# Patient Record
Sex: FEMALE | Race: WHITE | NOT HISPANIC OR LATINO | Employment: FULL TIME | URBAN - METROPOLITAN AREA
[De-identification: names, ages, dates, MRNs, and addresses within clinical notes are randomized per-mention and may not be internally consistent; named-entity substitution may affect disease eponyms.]

---

## 2017-03-03 ENCOUNTER — TELEPHONE (OUTPATIENT)
Dept: FAMILY MEDICINE | Facility: CLINIC | Age: 22
End: 2017-03-03

## 2017-03-03 NOTE — TELEPHONE ENCOUNTER
Reason for Call:  Other Pt's rogelio, Triston called with concerns about pt. Said he thought it was post partum at first, but now thinks it's depression. Pt has been not coming home & communicating with family.Gone sometimes 36 hours at a time without communicating to there where she is. No consent to communicate on file yet.    Detailed comments: Triston would like a call back if you would have time.     Phone Number Patient can be reached at: Other phone number:  [phone]    Best Time: any    Can we leave a detailed message on this number? YES    Call taken on 3/3/2017 at 2:04 PM by Odilia Judge

## 2017-03-06 NOTE — TELEPHONE ENCOUNTER
"Reached rogelio Mario, on his cell phone.  He sat down with pt this past weekend and she admitted some symptoms of depression - no suicidal thoughts or feelings voiced or acknowledged by pt.  Didn't reveal any information to Mario,  He's been trying to talk more about his feelings despite her \"breaking up with him\"  Several weeks ago.  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 They have never .   Made appt for Micki to come in to see me in 2 days time at 4:45pm on Wed 3/8/2017 - Mario will offer that time to pt and if she cannot make it in person secondary to work schedule - working 11am-7pm M-F , they will convert it to a telephone visit.  Please, call or return to clinic or go to the ER immediately if signs or symptoms worsen or fail to improve as anticipated.   "

## 2017-03-08 ENCOUNTER — OFFICE VISIT (OUTPATIENT)
Dept: FAMILY MEDICINE | Facility: CLINIC | Age: 22
End: 2017-03-08
Payer: COMMERCIAL

## 2017-03-08 VITALS
OXYGEN SATURATION: 99 % | WEIGHT: 151 LBS | SYSTOLIC BLOOD PRESSURE: 120 MMHG | HEART RATE: 85 BPM | BODY MASS INDEX: 24.27 KG/M2 | DIASTOLIC BLOOD PRESSURE: 78 MMHG | HEIGHT: 66 IN | TEMPERATURE: 98.1 F

## 2017-03-08 DIAGNOSIS — F41.9 ANXIETY: ICD-10-CM

## 2017-03-08 DIAGNOSIS — F32.1 MAJOR DEPRESSIVE DISORDER, SINGLE EPISODE, MODERATE (H): Primary | ICD-10-CM

## 2017-03-08 DIAGNOSIS — R45.4 IRRITABILITY AND ANGER: ICD-10-CM

## 2017-03-08 LAB
ERYTHROCYTE [DISTWIDTH] IN BLOOD BY AUTOMATED COUNT: 12.4 % (ref 10–15)
HCT VFR BLD AUTO: 39.3 % (ref 35–47)
HGB BLD-MCNC: 13.2 G/DL (ref 11.7–15.7)
MCH RBC QN AUTO: 30.2 PG (ref 26.5–33)
MCHC RBC AUTO-ENTMCNC: 33.6 G/DL (ref 31.5–36.5)
MCV RBC AUTO: 90 FL (ref 78–100)
PLATELET # BLD AUTO: 210 10E9/L (ref 150–450)
RBC # BLD AUTO: 4.37 10E12/L (ref 3.8–5.2)
WBC # BLD AUTO: 9 10E9/L (ref 4–11)

## 2017-03-08 PROCEDURE — 85027 COMPLETE CBC AUTOMATED: CPT | Performed by: FAMILY MEDICINE

## 2017-03-08 PROCEDURE — 99214 OFFICE O/P EST MOD 30 MIN: CPT | Performed by: FAMILY MEDICINE

## 2017-03-08 PROCEDURE — 36415 COLL VENOUS BLD VENIPUNCTURE: CPT | Performed by: FAMILY MEDICINE

## 2017-03-08 PROCEDURE — 84443 ASSAY THYROID STIM HORMONE: CPT | Performed by: FAMILY MEDICINE

## 2017-03-08 RX ORDER — CITALOPRAM HYDROBROMIDE 20 MG/1
TABLET ORAL
Qty: 30 TABLET | Refills: 1 | Status: SHIPPED | OUTPATIENT
Start: 2017-03-08 | End: 2019-08-23

## 2017-03-08 ASSESSMENT — ANXIETY QUESTIONNAIRES
7. FEELING AFRAID AS IF SOMETHING AWFUL MIGHT HAPPEN: MORE THAN HALF THE DAYS
5. BEING SO RESTLESS THAT IT IS HARD TO SIT STILL: NOT AT ALL
2. NOT BEING ABLE TO STOP OR CONTROL WORRYING: NOT AT ALL
1. FEELING NERVOUS, ANXIOUS, OR ON EDGE: MORE THAN HALF THE DAYS
3. WORRYING TOO MUCH ABOUT DIFFERENT THINGS: NOT AT ALL
6. BECOMING EASILY ANNOYED OR IRRITABLE: SEVERAL DAYS
GAD7 TOTAL SCORE: 7
IF YOU CHECKED OFF ANY PROBLEMS ON THIS QUESTIONNAIRE, HOW DIFFICULT HAVE THESE PROBLEMS MADE IT FOR YOU TO DO YOUR WORK, TAKE CARE OF THINGS AT HOME, OR GET ALONG WITH OTHER PEOPLE: SOMEWHAT DIFFICULT

## 2017-03-08 ASSESSMENT — PATIENT HEALTH QUESTIONNAIRE - PHQ9: 5. POOR APPETITE OR OVEREATING: MORE THAN HALF THE DAYS

## 2017-03-08 NOTE — PROGRESS NOTES
"  SUBJECTIVE:                                                    Micki Thorpe is a 21 year old female who presents to clinic today for the following health issues: here today with her fiance, Mario Holman.  He is father of her 2 children- Lotus and Jefry.   She's had some symptoms of feeling, \"blah\" x 2+ years - even prior to having her 2 kids. Even back to her teenage years.   No hx of seeking treatment for this either with counseling or medication.       Abnormal Mood Symptoms:      Onset: couple - comes and goes    Description:   Depression: YES  Anxiety: YES    Accompanying Signs & Symptoms:  Still participating in activities that you used to enjoy: YES  Fatigue: YES  Irritability: YES  Difficulty concentrating: no   Changes in appetite: YES- under eating  Problems with sleep: YES  Heart racing/beating fast : no   Thoughts of hurting yourself or others: none     History:   Recent stress: YES- single mom of 2 children under the age of 3. Just returned to work full time.   Prior depression hospitalization: None  Family history of depression: YES  Family history of anxiety: YES  Delivery of baby:  March 2016    Precipitating factors:   Alcohol/drug use: YES- alcohol - 3-4 drinks per week - has been drinking more than previous.    Previous to the last month - would be gone for 1-2 days.     Denies use of any other substances.     Alleviating factors:  working       Therapies Tried and outcome: None    She is now working full-time at [employer] - human interaction there helps. 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   Has been off work for a while, now going back to work starting in the next week. He's been stay-at-home dad for the last several months.      We had Mario step outside the room for a while.  Pt broke up with him/called off their engagement  4 weeks ago, but they are still living together . Pt " "wants to be independent and have her own money.  Doesn't feel any romantic feelings for Mario any longer, but feels like he's still her best friend and ackowledges him as being a \"really good dad\" and her best friend now.  Pt having some resentful feelings about being a single young mom and not being able to experience her early 20's as a person in that age group usually does - going out often with friends, partying, etc.  She loves her children dearly, but also wants to go out sometimes.  Doesn't feel romantically connected to Mario any more ,but doesn't want to hurt his feelings.     Feeling a lot of stress with managing work, kids, family expectations, etc.     No suicidal thoughts at all.  Feels like relationship with Mario is really good, just not romantic anymore.   Patient Active Problem List   Diagnosis     Blood type AB+ - no need for rhogam     CARDIOVASCULAR SCREENING; LDL GOAL LESS THAN 160     Unplanned wanted pregnancy - x 2- both on ocp's - 2nd on micronor      Indication for care in labor or delivery     Routine postpartum follow-up     Cervical high risk HPV (human papillomavirus) test positive       No current outpatient prescriptions on file.      No Known Allergies      Problem list and histories reviewed & adjusted, as indicated.  Additional history: as documented    BP Readings from Last 3 Encounters:   03/08/17 120/78   06/29/16 130/80   05/18/16 112/70    Wt Readings from Last 3 Encounters:   03/08/17 151 lb (68.5 kg)   06/29/16 165 lb (74.8 kg)   05/18/16 168 lb (76.2 kg)            Labs reviewed in EPIC    Reviewed and updated as needed this visit by clinical staff  Tobacco  Allergies  Meds  Med Hx  Surg Hx  Fam Hx  Soc Hx      Reviewed and updated as needed this visit by Provider        ROS:   Constitutional, HEENT, cardiovascular, pulmonary, GI, , musculoskeletal, neuro, skin, endocrine and psych systems are negative, except as otherwise noted.     OBJECTIVE:                          " "                          /78  Pulse 85  Temp 98.1  F (36.7  C) (Oral)  Ht 5' 6\" (1.676 m)  Wt 151 lb (68.5 kg)  SpO2 99%  BMI 24.37 kg/m2  Body mass index is 24.37 kg/(m^2).   GENERAL: healthy, alert, well nourished, well hydrated, no distress  HENT: ear canals- normal; TMs- normal; Nose- normal; Mouth- no ulcers, no lesions  NECK: no tenderness, no adenopathy, no asymmetry, no masses, no stiffness; thyroid- normal to palpation.   RESP: lungs clear to auscultation - no rales, no rhonchi, no wheezes  CV: regular rates and rhythm, normal S1 S2, no S3 or S4 and no murmur, no click or rub -  ABDOMEN: soft, no tenderness, no  hepatosplenomegaly, no masses, normal bowel sounds  Alert and oriented. No acute distress. Appears well-groomed and casually dressed. Affect is mildly depressed. In good humor , though, and laughs appropriately. Not particularly anxious. No evidence of psychosis.      PHQ-9 SCORE 4/23/2013 8/4/2014 3/8/2017   Total Score 0 1 -   Total Score - - 14     ZULMA-7 SCORE 4/23/2013 3/8/2017   Total Score 0 -   Total Score - 7          Diagnostic test results:  Results for orders placed or performed in visit on 03/08/17 (from the past 24 hour(s))   CBC with platelets   Result Value Ref Range    WBC 9.0 4.0 - 11.0 10e9/L    RBC Count 4.37 3.8 - 5.2 10e12/L    Hemoglobin 13.2 11.7 - 15.7 g/dL    Hematocrit 39.3 35.0 - 47.0 %    MCV 90 78 - 100 fl    MCH 30.2 26.5 - 33.0 pg    MCHC 33.6 31.5 - 36.5 g/dL    RDW 12.4 10.0 - 15.0 %    Platelet Count 210 150 - 450 10e9/L        ASSESSMENT/PLAN:                                                        ICD-10-CM    1. Major depressive disorder, single episode, moderate (H) F32.1 CBC with platelets     TSH with free T4 reflex     MENTAL HEALTH REFERRAL     CARE COORDINATION REFERRAL     citalopram (CELEXA) 20 MG tablet     CANCELED: CBC with platelets     CANCELED: TSH with free T4 reflex   2. Irritability and anger R45.4 CARE COORDINATION REFERRAL     citalopram " "(CELEXA) 20 MG tablet   3. Anxiety F41.9 CARE COORDINATION REFERRAL     citalopram (CELEXA) 20 MG tablet       See Patient Instructions.    Please, call or return to clinic or go to the ER immediately if signs or symptoms worsen or fail to improve as anticipated. discussed risks, benefits, and alternatives of meds prescribed and pt agrees to accept possible side effects and risks, including, but not limited to interactions w/ alcohol..       recheck with me in 1 month or sooner if needed.     Spent 40+ minutes on pt care today. All face to face time from 5:10pm  to 5:52pm.  Greater than 50% of time spent in coordination of care/counseling today re:   1. Major depressive disorder, single episode, moderate (H)    2. Irritability and anger    3. Anxiety      Mario voiced that he'd like possible treatment for relationship and referral for counseling as well.  He would like to see his provider at [facility] and will call them tomorrow to be seen there in the near future. Would like to see someone individually for counseling and working on his \"feelings\" and communication skills as well.  No suicidal or signif. Depressive thoughts right now.           [facility] [place]     "

## 2017-03-08 NOTE — NURSING NOTE
"Chief Complaint   Patient presents with     Depression       Initial BP (!) 136/92  Pulse 85  Temp 98.1  F (36.7  C) (Oral)  Ht 5' 6\" (1.676 m)  Wt 151 lb (68.5 kg)  SpO2 99%  BMI 24.37 kg/m2 Estimated body mass index is 24.37 kg/(m^2) as calculated from the following:    Height as of this encounter: 5' 6\" (1.676 m).    Weight as of this encounter: 151 lb (68.5 kg)..  BP completed using cuff size: regular  Sharon Osman MA  "

## 2017-03-08 NOTE — MR AVS SNAPSHOT
After Visit Summary   3/8/2017    Micki Thorpe    MRN: [id number]           Patient Information     Date Of Birth          1995        Visit Information        Provider Department      3/8/2017 4:45 PM Ashwini Garcia MD [facility] [place]        Today's Diagnoses     Major depressive disorder, single episode, moderate (H)    -  1    Irritability and anger        Anxiety          Care Instructions            .recheck with me in 1 month or sooner if needed.        Major Depression  What is major depression?   Depression is a condition in which you feel sad, hopeless, and uninterested in daily life. Major depression is severe depression that lasts for at least 2 full weeks.   How does it occur?   Major depression may start after some event or it may not be caused by anything specific. You may have major depression after a period of having dysthymia. Dysthymia is being mildly depressed almost every day for 2 or more years. If major depression develops from dysthymia, you are more likely to have major depression in the future.   People are more likely to develop depression if they:   have family members who have had depression, bipolar disorder, or anxiety problems   are female. Women are twice as likely as men to have major depression   have a major medical problem such as heart disease or cancer   The chemicals in your nervous system and the way that brain cells communicate changes with major depression. Exactly how this works and what it means are not fully understood.   Major depression may start at any age. Teenagers and young adults, as well as older adults, are more likely to have this condition than middle-aged adults.   What are the symptoms?   Besides feeling very sad and uninterested in things you usually enjoy, you may also:   be irritable   have trouble falling asleep, wake up very early, or sleep too much   feel more anxiety or panic   notice changes in your appetite  and weight, either up or down   notice changes in your energy level, usually down but sometimes feeling overexcited   lose sexual desire and function   feel worthless and guilty   have trouble concentrating or remembering things   feel hopeless or just not care about anything   have unexplained physical symptoms   think often about death or suicide   Other symptoms may vary with age. If you are a teenager, you may be irritable, get angry, abuse substances, and cause trouble with parents and at school. If you are a young or middle-aged adult, you may abuse substances such as drugs or alcohol, have physical problems (like pain or stomach upsets), or feel nervous.   Depressed older people are more likely to complain of physical problems than that they are feeling sad, anxious, or hopeless. Tiredness, mood changes, sleepiness, and memory problems may be side effects of medicines rather than symptoms of depression. Other medical conditions, such as diabetes, heart disease, and Alzheimer's disease, can also cause similar symptoms.   How is it diagnosed?   Your healthcare provider or a mental health professional will ask about your symptoms and any drug or alcohol use. You may have lab tests to rule out medical problems such as hormone imbalances. There are no lab tests that directly diagnose depression.   How is it treated?   Do not try to overcome clinical depression by yourself. It can usually be successfully treated with psychotherapy, antidepressant medicine, or both. Discuss this with your healthcare provider or therapist.   Medicine  Several types of prescription medicines can help treat major depression. Your healthcare provider will work with you to carefully select the right medicine for you.   You must take these medicines daily for 3 to 6 weeks to get full benefit from them. Most people benefit from taking these medicines for at least 6 months.   No nonprescription medicines are effective to treat major  depression.   Psychotherapy   Seeing a mental health therapist can help with all forms of depression. You may need therapy for a short time or for many months. One very helpful form of psychotherapy is cognitive behavioral therapy (CBT). CBT helps you identify and change thought processes that can lead to depression. Replacing negative thoughts with more positive ones reduces depression. Interpersonal therapy has also been shown to work very well.   Diets rich in fruits and vegetables are recommended for people with depression. A multivitamin and mineral supplement may also be recommended.   Claims have been made that certain herbal and dietary products help control depression symptoms. Omega-3 fatty acids may help to reduce symptoms of depression. Jose David's wort may help mild symptoms of depression. It will not help severe cases of depression. It may worsen bipolar disorder. No herb or dietary supplement has been proven to consistently or completely relieve depression. Supplements are not tested or standardized and may vary in strengths and effects. They may have side effects and are not always safe.   Learning ways to relax may help. Yoga and meditation may also be helpful. You may want to talk with your healthcare provider about using these methods along with medicines and psychotherapy.   How long will the effects last?   Major depression usually improves within a few weeks. Some people have it only once, while others have many episodes. Major depression can be shortened, and possibly prevented, with treatment.   What can I do to help myself or my loved one?   Seeking treatment quickly is the best thing to do. Watch closely for the signs of depression. Get treatment before the symptoms become bad.   Certain medicines can add to the symptoms of depression. If you have had depression, tell all healthcare providers who treat you about all medicines you are taking, including nonprescription products and natural  remedies.   Maintaining a healthy lifestyle and social activities are most important. To help prevent depression:   Exercise for at least 30 minutes every day, for example a brisk walk.   Learn which activities make you feel better and do them often.   Talk to your family and friends.   Eat a healthy diet.   Avoid alcohol, caffeine, and nicotine.   Do not use drugs.   Learn ways to lower stress, such as breathing and muscle relaxation exercises.   When should I seek help?   If you are showing the signs of major depression, seek professional help quickly. Do not try to treat your depression by yourself. Professional treatment is necessary.   Most of the time, you will feel much better after a few weeks of treatment. Some people with untreated major depression commit suicide. Many more attempt suicide or try to hurt themselves. After treatment and feeling better, these same people usually cannot believe that once they felt so bad and wanted to die.   Get emergency care if you or a loved one has serious thoughts of suicide or harming others.   For more information, see:   Depression: Its Symptoms and Treatment  Adjustment Disorders with Depressed Mood  Cognitive Therapy    Published by [email or web address].  This content is reviewed periodically and is subject to change as new health information becomes available. The information is intended to inform and educate and is not a replacement for medical evaluation, advice, diagnosis or treatment by a healthcare professional.   Written by Estela Cabrera, PhD, for [email or web address].   ? 2010 [email or web address] and/or its affiliates. All Rights Reserved.   Copyright   [employer] 2011  Adult Health Advisor                     Generalized Anxiety Disorder  What is generalized anxiety disorder?   Generalized anxiety disorder (ZULMA) is a condition in which a person worries excessively and unrealistically. They may also be jittery, restless, or dizzy. When these symptoms last for at least  "6 months, a diagnosis of ZULMA may be made.  ZULMA may exist by itself, or with both anxiety and depression. It is estimated that almost 5% of people have had this disorder during their lives.  How does it occur?   The cause of ZULMA is unknown. Genetic and environmental factors play a role. Women have ZULMA about twice as often as men.  The worry in ZULMA is not about panic attacks or being afraid in public places. It is typically \"free-floating\" anxiety out of proportion to any real life situation. The worrying can interfere with normal day-to-day activities and work or school.  What are the symptoms?   Symptoms include excessive, unrealistic, and uncontrollable worrying about many things such as:  the state of the world   the economy   violence in society   your job   the bills   chores   family members  Physical symptoms such as muscle tension, sleep problems, or feeling on edge usually go along with anxiety. A person may be short-tempered and unable to focus or concentrate because of the worrying. Other symptoms include sweating, shaking, having a very fast heartbeat, feeling out of breath, needing to go to the bathroom often and feeling like fainting. People with ZULMA may be uneasy in a group or in a waiting room.  How is it diagnosed?   There is no lab test for ZULMA. Your healthcare provider or therapist will ask about your symptoms. He or she will make sure you do not have a medical illness or drug or alcohol problem that could cause the symptoms. Some medicines can cause anxiety or make it worse. These include asthma medicines, stimulants, and steroids such as prednisone.  If you have had the symptoms for at least 6 months, if you have had to cut back on your activities, and if you find it difficult to get things done, you may be diagnosed with generalized anxiety disorder.  How is it treated?   Different types of approaches have proven helpful in treating ZULMA. These include medicine, behavior therapy, relaxation " therapy, cognitive therapy, and stress management techniques. Which treatments your healthcare provider or therapist uses may depend upon how much the disorder interferes with your day-to-day life.  Several types of medicines can help treat ZULMA. Your healthcare provider will work with you to carefully select the best one for you.  How long will the effects last?   ZULMA can last many years and sometimes an entire lifetime.   How can I take care of myself?   Get support. Talk with family and friends. Consider joining a support group in your area. Go to a stress management class in your local community.   Learn to manage stress. Ask for help at home and work when the load is too great to handle. Find ways to relax, for example take up a hobby, listen to music, watch movies, take walks. Try deep breathing exercises when you feel stressed.   Take care of your physical health. Try to get at least 7 to 9 hours of sleep each night. Eat a healthy diet. Limit caffeine. If you smoke, quit. Avoid alcohol and drugs, because they can make your symptoms worse. Exercise according to your healthcare provider's instructions.   Check your medicines. To help prevent problems, tell your healthcare provider and pharmacist about all the medicines, natural remedies, vitamins, and other supplements that you take.   Contact your healthcare provider or therapist if you have any questions or your symptoms seem to be getting worse.  You may also want to contact [facility] [employer] [facility] (formerly the National Mental Health Association or NMHA). NM's toll-free Information Center number is 3-866-437-[facility]. Its web site address is http://www.NMHA.org                 Thank you for choosing [facility]  for your Health Care. It was a pleasure seeing you at your visit today. Please contact us with any questions or concerns you may have.                   Ashwini Garcia MD                                  To reach your [facility] team after hours call:   [phone]    Our clinic hours are:     Monday- 7:30 am - 7:00 pm                             Tuesday through Friday- 7:30 am - 5:00 pm                                        Saturday- 8:00 am - 12:00 pm                  Phone:  [phone]    Our pharmacy hours are:     Monday  8:00 am to 7:00 pm      Tuesday through Friday 8:00am to 6:00pm                        Saturday - 9:00 am to 1:00 pm      Sunday : Closed.              Phone:  [phone]      There is also information available at our web site:  [email or web address]    If your provider ordered any lab tests and you do not receive the results within 10 business days, please call the clinic.    If you need a medication refill please contact your pharmacy.  Please allow 2 business days for your refill to be completed.    Our clinic offers telephone visits and e visits.  Please ask one of your team members to explain more.      Use [email or web address] (secure email communication and access to your chart) to send your primary care provider a message or make an appointment. Ask someone on your Team how to sign up for [email or web address].                     Follow-ups after your visit        Additional Services     CARE COORDINATION REFERRAL       Services are provided by a Care Coordinator for people with complex needs such as: medical, social, or financial troubles.  The Care Coordinator works with the patient and their Primary Care Provider to determine health goals, obtain resources, achieve outcomes, and develop care plans that help coordinate the patient's care.     Reason for Referral: Other Financial Concerns    Provide additional details for Care Coordination to best meet the patient's current needs: single mom looking for resources for her and her children - breaking up with FOB.  Safe relationship, just changing.     Clinical Staff have discussed the Care Coordination Referral with the patient and/or caregiver: yes             MENTAL HEALTH REFERRAL       Your provider has referred you to: Behavioral Healthcare Providers Intake Scheduling [phone]   [email or web address]    Individual and couples counseling , please.   May need resources in establishing parenting agreements, if decide for sure not to stay together.     All scheduling is subject to the client's specific insurance plan & benefits, provider/location availability, and provider clinical specialities.  Please arrive 15 minutes early for your first appointment and bring your completed paperwork.    Please be aware that coverage of these services is subject to the terms and limitations of your health insurance plan.  Call member services at your health plan with any benefit or coverage questions.                  Who to contact     If you have questions or need follow up information about today's clinic visit or your schedule please contact [facility] directly at [phone].  Normal or non-critical lab and imaging results will be communicated to you by [employer], letter or phone within 4 business days after the clinic has received the results. If you do not hear from us within 7 days, please contact the clinic through [employer] or phone. If you have a critical or abnormal lab result, we will notify you by phone as soon as possible.  Submit refill requests through [employer] or call your pharmacy and they will forward the refill request to us. Please allow 3 business days for your refill to be completed.          Additional Information About Your Visit        [employer] Information     [employer] gives you secure access to your electronic health record. If you see a primary care provider, you can also send messages to your care team and make appointments. If you have questions, please call your primary care clinic.  If you do not have a primary care provider, please call [phone] and they will assist you.        Care EveryWhere ID     This is your Care EveryWhere  "ID. This could be used by other organizations to access your [facility] medical records  [id number]        Your Vitals Were     Pulse Temperature Height Pulse Oximetry BMI (Body Mass Index)       85 98.1  F (36.7  C) (Oral) 5' 6\" (1.676 m) 99% 24.37 kg/m2        Blood Pressure from Last 3 Encounters:   03/08/17 (!) 136/92   06/29/16 130/80   05/18/16 112/70    Weight from Last 3 Encounters:   03/08/17 151 lb (68.5 kg)   06/29/16 165 lb (74.8 kg)   05/18/16 168 lb (76.2 kg)              We Performed the Following     CARE COORDINATION REFERRAL     CBC with platelets     MENTAL HEALTH REFERRAL     TSH with free T4 reflex          Today's Medication Changes          These changes are accurate as of: 3/8/17  5:39 PM.  If you have any questions, ask your nurse or doctor.               Start taking these medicines.        Dose/Directions    citalopram 20 MG tablet   Commonly known as:  celeXA   Used for:  Major depressive disorder, single episode, moderate (H), Irritability and anger, Anxiety   Started by:  Ashwini Garcia MD        Take 1/2 tablet (10 mg) for 1-2 weeks, then increase to 1 tablet orally daily   Quantity:  30 tablet   Refills:  1         Stop taking these medicines if you haven't already. Please contact your care team if you have questions.     acetaminophen 325 MG tablet   Commonly known as:  TYLENOL   Stopped by:  Ashwini Garcia MD           ibuprofen 400 MG tablet   Commonly known as:  ADVIL/MOTRIN   Stopped by:  Ashwini Garcia MD           order for DME   Stopped by:  Ashwini Garcia MD                Where to get your medicines      These medications were sent to [facility] [place] [facility] [address] [address]     Phone:  [phone]     citalopram 20 MG tablet                Primary Care Provider Office Phone # Fax #    Ashwini Garcia [phone]       [place] " ALICIA [facility] [address] [facility] [address]        Thank you!     Thank you for choosing [facility]  for your care. Our goal is always to provide you with excellent care. Hearing back from our patients is one way we can continue to improve our services. Please take a few minutes to complete the written survey that you may receive in the mail after your visit with us. Thank you!             Your Updated Medication List - Protect others around you: Learn how to safely use, store and throw away your medicines at www.disposemymeds.org.          This list is accurate as of: 3/8/17  5:39 PM.  Always use your most recent med list.                   Brand Name Dispense Instructions for use    citalopram 20 MG tablet    celeXA    30 tablet    Take 1/2 tablet (10 mg) for 1-2 weeks, then increase to 1 tablet orally daily

## 2017-03-08 NOTE — PATIENT INSTRUCTIONS
.recheck with me in 1 month or sooner if needed.        Major Depression  What is major depression?   Depression is a condition in which you feel sad, hopeless, and uninterested in daily life. Major depression is severe depression that lasts for at least 2 full weeks.   How does it occur?   Major depression may start after some event or it may not be caused by anything specific. You may have major depression after a period of having dysthymia. Dysthymia is being mildly depressed almost every day for 2 or more years. If major depression develops from dysthymia, you are more likely to have major depression in the future.   People are more likely to develop depression if they:   have family members who have had depression, bipolar disorder, or anxiety problems   are female. Women are twice as likely as men to have major depression   have a major medical problem such as heart disease or cancer   The chemicals in your nervous system and the way that brain cells communicate changes with major depression. Exactly how this works and what it means are not fully understood.   Major depression may start at any age. Teenagers and young adults, as well as older adults, are more likely to have this condition than middle-aged adults.   What are the symptoms?   Besides feeling very sad and uninterested in things you usually enjoy, you may also:   be irritable   have trouble falling asleep, wake up very early, or sleep too much   feel more anxiety or panic   notice changes in your appetite and weight, either up or down   notice changes in your energy level, usually down but sometimes feeling overexcited   lose sexual desire and function   feel worthless and guilty   have trouble concentrating or remembering things   feel hopeless or just not care about anything   have unexplained physical symptoms   think often about death or suicide   Other symptoms may vary with age. If you are a teenager, you may be irritable, get angry,  abuse substances, and cause trouble with parents and at school. If you are a young or middle-aged adult, you may abuse substances such as drugs or alcohol, have physical problems (like pain or stomach upsets), or feel nervous.   Depressed older people are more likely to complain of physical problems than that they are feeling sad, anxious, or hopeless. Tiredness, mood changes, sleepiness, and memory problems may be side effects of medicines rather than symptoms of depression. Other medical conditions, such as diabetes, heart disease, and Alzheimer's disease, can also cause similar symptoms.   How is it diagnosed?   Your healthcare provider or a mental health professional will ask about your symptoms and any drug or alcohol use. You may have lab tests to rule out medical problems such as hormone imbalances. There are no lab tests that directly diagnose depression.   How is it treated?   Do not try to overcome clinical depression by yourself. It can usually be successfully treated with psychotherapy, antidepressant medicine, or both. Discuss this with your healthcare provider or therapist.   Medicine  Several types of prescription medicines can help treat major depression. Your healthcare provider will work with you to carefully select the right medicine for you.   You must take these medicines daily for 3 to 6 weeks to get full benefit from them. Most people benefit from taking these medicines for at least 6 months.   No nonprescription medicines are effective to treat major depression.   Psychotherapy   Seeing a mental health therapist can help with all forms of depression. You may need therapy for a short time or for many months. One very helpful form of psychotherapy is cognitive behavioral therapy (CBT). CBT helps you identify and change thought processes that can lead to depression. Replacing negative thoughts with more positive ones reduces depression. Interpersonal therapy has also been shown to work very  well.   Diets rich in fruits and vegetables are recommended for people with depression. A multivitamin and mineral supplement may also be recommended.   Claims have been made that certain herbal and dietary products help control depression symptoms. Omega-3 fatty acids may help to reduce symptoms of depression. Rathdrum's wort may help mild symptoms of depression. It will not help severe cases of depression. It may worsen bipolar disorder. No herb or dietary supplement has been proven to consistently or completely relieve depression. Supplements are not tested or standardized and may vary in strengths and effects. They may have side effects and are not always safe.   Learning ways to relax may help. Yoga and meditation may also be helpful. You may want to talk with your healthcare provider about using these methods along with medicines and psychotherapy.   How long will the effects last?   Major depression usually improves within a few weeks. Some people have it only once, while others have many episodes. Major depression can be shortened, and possibly prevented, with treatment.   What can I do to help myself or my loved one?   Seeking treatment quickly is the best thing to do. Watch closely for the signs of depression. Get treatment before the symptoms become bad.   Certain medicines can add to the symptoms of depression. If you have had depression, tell all healthcare providers who treat you about all medicines you are taking, including nonprescription products and natural remedies.   Maintaining a healthy lifestyle and social activities are most important. To help prevent depression:   Exercise for at least 30 minutes every day, for example a brisk walk.   Learn which activities make you feel better and do them often.   Talk to your family and friends.   Eat a healthy diet.   Avoid alcohol, caffeine, and nicotine.   Do not use drugs.   Learn ways to lower stress, such as breathing and muscle relaxation exercises.  "  When should I seek help?   If you are showing the signs of major depression, seek professional help quickly. Do not try to treat your depression by yourself. Professional treatment is necessary.   Most of the time, you will feel much better after a few weeks of treatment. Some people with untreated major depression commit suicide. Many more attempt suicide or try to hurt themselves. After treatment and feeling better, these same people usually cannot believe that once they felt so bad and wanted to die.   Get emergency care if you or a loved one has serious thoughts of suicide or harming others.   For more information, see:   Depression: Its Symptoms and Treatment  Adjustment Disorders with Depressed Mood  Cognitive Therapy    Published by [employer].  This content is reviewed periodically and is subject to change as new health information becomes available. The information is intended to inform and educate and is not a replacement for medical evaluation, advice, diagnosis or treatment by a healthcare professional.   Written by Estela Cabrera, PhD, for [employer].   ? 2010 [facility] and/or its affiliates. All Rights Reserved.   Copyright   Clinical Reference Systems 2011  Adult Health Advisor                     Generalized Anxiety Disorder  What is generalized anxiety disorder?   Generalized anxiety disorder (ZULMA) is a condition in which a person worries excessively and unrealistically. They may also be jittery, restless, or dizzy. When these symptoms last for at least 6 months, a diagnosis of ZULMA may be made.  ZULMA may exist by itself, or with both anxiety and depression. It is estimated that almost 5% of people have had this disorder during their lives.  How does it occur?   The cause of ZULMA is unknown. Genetic and environmental factors play a role. Women have ZULMA about twice as often as men.  The worry in ZULMA is not about panic attacks or being afraid in public places. It is typically \"free-floating\" anxiety " out of proportion to any real life situation. The worrying can interfere with normal day-to-day activities and work or school.  What are the symptoms?   Symptoms include excessive, unrealistic, and uncontrollable worrying about many things such as:  the state of the world   the economy   violence in society   your job   the bills   chores   family members  Physical symptoms such as muscle tension, sleep problems, or feeling on edge usually go along with anxiety. A person may be short-tempered and unable to focus or concentrate because of the worrying. Other symptoms include sweating, shaking, having a very fast heartbeat, feeling out of breath, needing to go to the bathroom often and feeling like fainting. People with ZULMA may be uneasy in a group or in a waiting room.  How is it diagnosed?   There is no lab test for ZULMA. Your healthcare provider or therapist will ask about your symptoms. He or she will make sure you do not have a medical illness or drug or alcohol problem that could cause the symptoms. Some medicines can cause anxiety or make it worse. These include asthma medicines, stimulants, and steroids such as prednisone.  If you have had the symptoms for at least 6 months, if you have had to cut back on your activities, and if you find it difficult to get things done, you may be diagnosed with generalized anxiety disorder.  How is it treated?   Different types of approaches have proven helpful in treating ZULMA. These include medicine, behavior therapy, relaxation therapy, cognitive therapy, and stress management techniques. Which treatments your healthcare provider or therapist uses may depend upon how much the disorder interferes with your day-to-day life.  Several types of medicines can help treat ZULMA. Your healthcare provider will work with you to carefully select the best one for you.  How long will the effects last?   ZULMA can last many years and sometimes an entire lifetime.   How can I take care of  myself?   Get support. Talk with family and friends. Consider joining a support group in your area. Go to a stress management class in your local community.   Learn to manage stress. Ask for help at home and work when the load is too great to handle. Find ways to relax, for example take up a hobby, listen to music, watch movies, take walks. Try deep breathing exercises when you feel stressed.   Take care of your physical health. Try to get at least 7 to 9 hours of sleep each night. Eat a healthy diet. Limit caffeine. If you smoke, quit. Avoid alcohol and drugs, because they can make your symptoms worse. Exercise according to your healthcare provider's instructions.   Check your medicines. To help prevent problems, tell your healthcare provider and pharmacist about all the medicines, natural remedies, vitamins, and other supplements that you take.   Contact your healthcare provider or therapist if you have any questions or your symptoms seem to be getting worse.  You may also want to contact [facility] (formerly the National Mental Health Association or [facility]). [facility]'s toll-free Information Center number is 6-552-602-[facility]. Its web site address is [email or web address]                 Thank you for choosing [facility]  for your Health Care. It was a pleasure seeing you at your visit today. Please contact us with any questions or concerns you may have.                   Ashwini Garcia MD                                  To reach your [facility] care team after hours call:   [phone]    Our clinic hours are:     Monday- 7:30 am - 7:00 pm                             Tuesday through Friday- 7:30 am - 5:00 pm                                        Saturday- 8:00 am - 12:00 pm                  Phone:  [phone]    Our pharmacy hours are:     Monday  8:00 am to 7:00 pm      Tuesday through Friday 8:00am to 6:00pm                        Saturday - 9:00 am to 1:00  pm      Sunday : Closed.              Phone:  [phone]      There is also information available at our web site:  [email or web address]    If your provider ordered any lab tests and you do not receive the results within 10 business days, please call the clinic.    If you need a medication refill please contact your pharmacy.  Please allow 2 business days for your refill to be completed.    Our clinic offers telephone visits and e visits.  Please ask one of your team members to explain more.      Use WaveCheckhart (secure email communication and access to your chart) to send your primary care provider a message or make an appointment. Ask someone on your Team how to sign up for apartumt.

## 2017-03-09 ENCOUNTER — CARE COORDINATION (OUTPATIENT)
Dept: CARE COORDINATION | Facility: CLINIC | Age: 22
End: 2017-03-09

## 2017-03-09 LAB — TSH SERPL DL<=0.005 MIU/L-ACNC: 0.94 MU/L (ref 0.4–4)

## 2017-03-09 ASSESSMENT — PATIENT HEALTH QUESTIONNAIRE - PHQ9: SUM OF ALL RESPONSES TO PHQ QUESTIONS 1-9: 14

## 2017-03-09 ASSESSMENT — ANXIETY QUESTIONNAIRES: GAD7 TOTAL SCORE: 7

## 2017-03-09 NOTE — PROGRESS NOTES
Clinic Care Coordination Contact  [facility]/Voicemail    Referral Source: PCP  Clinical Data: Care Coordinator Outreach  Outreach attempted x 1.  Left message on voicemail with call back information and requested return call.  Plan:  Care Coordinator will try to reach patient again in 3-5 business days.  EUGENIE Thomson    Care Coordinator  [facility], [facility], and Women's   [phone]

## 2017-03-13 ENCOUNTER — CARE COORDINATION (OUTPATIENT)
Dept: CARE COORDINATION | Facility: CLINIC | Age: 22
End: 2017-03-13

## 2017-03-13 NOTE — PROGRESS NOTES
Clinic Care Coordination Contact  [facility]/Voicemail    Referral Source: PCP  Clinical Data: Care Coordinator Outreach  Outreach attempted x 2.  Left message on voicemail with call back information and requested return call.  Plan: Care Coordinator reviewed chart; left  [facility] telephone number in voice message for Micki. SW noted there is a signed Auth to discuss PHI with her mother and Mario; At this time SW does not have a reason to call anyone other than Micki. SW explained role of CCC. Care Coordinator will try to reach patient again in 3-5 business days.    Leigha Dowd [facility]  Clinic Care Coordinator-  Clinics: [place], [place], [place]  E-Mail: [email or web address]  Telephone: [phone]

## 2017-03-30 ENCOUNTER — CARE COORDINATION (OUTPATIENT)
Dept: CARE COORDINATION | Facility: CLINIC | Age: 22
End: 2017-03-30

## 2017-03-30 NOTE — LETTER
Health Care Home - Access Care Plan    About Me  Patient Name:  Micki Alex    YOB: 1995  Age:                            21 year old   Abad MRN:         [id number] Telephone Information:     Home Phone [phone]   Mobile [phone]       Address:    [address] [address] Email address:  [email or web address]      Emergency Contact(s)  Name Relationship Lgl Grd Work Phone Home Phone Mobile Phone   1. LUCY ALEX Mother  [phone]   2. ROSLYN JOSHI Significant ot*  NONE [phone]             Health Maintenance: Routine Health maintenance Reviewed: Due/Overdue     My Access Plan  Medical Emergency 911   Questions or concerns during clinic hours Primary Clinic Line, I will call the clinic directly: Primary Clinic: [facility] [phone]   24 Hour Appointment Line [phone] or  [phone])  (toll free)   24 Hour Nurse Line [phone] (toll free)   Questions or concerns outside clinic hours 24 Hour Appointment Line, I will call the after-hours on-call line:   [facility] [phone] or [phone] ([phone]) (toll-free)   Preferred Urgent Care Preferred Urgent Care: Other   Preferred Hospital Preferred Hospital: [facility]  [phone]   Preferred Pharmacy [facility] [place] [address] [address]     Behavioral Health Crisis Line Crisis Connection, [phone] or 911     My Care Team Members  Patient Care Team       Relationship Specialty Notifications Start End    Ashwini Garcia MD PCP - General Family Practice  1/22/14     Phone: [phone] Fax: [phone]         [facility] [place] [address] [place] [address]        My Medical and Care Information  Problem List   Patient Active Problem List   Diagnosis     Blood type AB+ - no need for rhogam      CARDIOVASCULAR SCREENING; LDL GOAL LESS THAN 160     Unplanned wanted pregnancy - x 2- both on ocp's - 2nd on micronor      Indication for care in labor or delivery     Routine postpartum follow-up     Cervical high risk HPV (human papillomavirus) test positive     Anxiety     Major depressive disorder, single episode, moderate (H)     Irritability and anger      Current Medications and Allergies:  See printed Medication Report    [phone]

## 2017-03-30 NOTE — LETTER
[place] CARE COORDINATION  [address] [address]  Phone: [phone]      March 30, 2017      Micki Thorpe  [address] [address]    Dear Micki,  I am the Clinic Care Coordinator that works with your primary care provider's clinic. I have been trying to reach you recently to introduce Clinic Care Coordination and to see if there was anything I could assist you with.  Below is a description of what Clinic Care Coordination is and how I can further assist you.     The Clinic Care Coordinator role is a Registered Nurse and/or  who understands the health care system. The goal of Clinic Care Coordination is to help you manage your health and improve access to the [facility] system in the most efficient manner.  The Registered Nurse can assist you in meeting your health care goals by providing education, coordinating services, and strengthening the communication among your providers. The  can assist you with financial, behavioral, psychosocial, and chemical dependency and counseling/psychiatric resources.    Please feel free to keep this letter and contact information to contact me at [phone] with any further questions or concerns that may arise. We at [facility] are focused on providing you with the highest-quality healthcare experience possible and that all starts with you.       Sincerely,     Odilia Dowd    Enclosed: I have enclosed a copy of a 24 Hour Access Plan. This has helpful phone numbers for you to call when needed. Please keep this in an easy to access place to use as needed.

## 2017-03-30 NOTE — PROGRESS NOTES
Clinic Care Coordination Contact  [facility]/Voicemail    Referral Source: PCP  Clinical Data: Care Coordinator Outreach  Outreach attempted x 3.  Left message on voicemail with call back information and requested return call.  Plan: Care Coordinator mailed [place] and 24 Hour Access Letter; SW added [facility] telephone number. Care Coordinator will do no further outreaches at this time.  Leigha Dowd [facility]  Clinic Care Coordinator-  Clinics: [place], [place], [place]  E-Mail: [email or web address]  Telephone: [phone]

## 2017-06-23 ENCOUNTER — HEALTH MAINTENANCE LETTER (OUTPATIENT)
Age: 22
End: 2017-06-23

## 2018-04-10 ENCOUNTER — TELEPHONE (OUTPATIENT)
Dept: FAMILY MEDICINE | Facility: CLINIC | Age: 23
End: 2018-04-10

## 2018-04-10 NOTE — TELEPHONE ENCOUNTER
Pt is past due for f/u pap smear.  1 reminder letter sent previously.  Left msg today for patient to call clinic and schedule.    CINDY ValentinN, RN, Pap Tracking Nurse     9/16/15 NIL/+ HR HPV. @ 20 yo.   5/18/16 NIL. Plan: repeat diagnostic pap in 1 year, per provider.  6/9/17: Pap/HPV reminder letter printed and sent. (dbe)

## 2018-06-29 ENCOUNTER — HEALTH MAINTENANCE LETTER (OUTPATIENT)
Age: 23
End: 2018-06-29

## 2018-08-22 ENCOUNTER — ALLIED HEALTH/NURSE VISIT (OUTPATIENT)
Dept: NURSING | Facility: CLINIC | Age: 23
End: 2018-08-22
Payer: COMMERCIAL

## 2018-08-22 DIAGNOSIS — Z11.1 PPD SCREENING TEST: Primary | ICD-10-CM

## 2018-08-22 PROCEDURE — 86580 TB INTRADERMAL TEST: CPT

## 2018-08-22 PROCEDURE — 99207 ZZC NO CHARGE NURSE ONLY: CPT

## 2018-08-22 NOTE — MR AVS SNAPSHOT
After Visit Summary   8/22/2018    Micki Thorpe    MRN: [id number]           Patient Information     Date Of Birth          1995        Visit Information        Provider Department      8/22/2018 3:30 PM RI IM NURSE [facility]        Today's Diagnoses     PPD screening test    -  1       Follow-ups after your visit        Who to contact     If you have questions or need follow up information about today's clinic visit or your schedule please contact [facility] directly at [phone].  Normal or non-critical lab and imaging results will be communicated to you by Siteflyhart, letter or phone within 4 business days after the clinic has received the results. If you do not hear from us within 7 days, please contact the clinic through Siteflyhart or phone. If you have a critical or abnormal lab result, we will notify you by phone as soon as possible.  Submit refill requests through [employer] or call your pharmacy and they will forward the refill request to us. Please allow 3 business days for your refill to be completed.          Additional Information About Your Visit        MyChart Information     [employer] gives you secure access to your electronic health record. If you see a primary care provider, you can also send messages to your care team and make appointments. If you have questions, please call your primary care clinic.  If you do not have a primary care provider, please call [phone] and they will assist you.        Care EveryWhere ID     This is your Care EveryWhere ID. This could be used by other organizations to access your [place] medical records  [id number]         Blood Pressure from Last 3 Encounters:   03/08/17 120/78   06/29/16 130/80   05/18/16 112/70    Weight from Last 3 Encounters:   03/08/17 151 lb (68.5 kg)   06/29/16 165 lb (74.8 kg)   05/18/16 168 lb (76.2 kg)              We Performed the Following     TB INTRADERMAL TEST        Primary Care  Provider Office Phone # Fax #    Ashwini Garcia [phone] [address] [address]        Equal Access to Services     CHAU STANFORD : Hadii aad ku hadalethao Sosami, waaxda luqadaha, qaybta kaalmada adejadeda, billy martínez laLesleywendie marvin. So [place] [phone].    ATENCIÓN: Si habla español, tiene a paulino disposición servicios gratuitos de asistencia lingüística. Llame al [phone].    We comply with applicable federal civil rights laws and [place] laws. We do not discriminate on the basis of race, color, national origin, age, disability, sex, sexual orientation, or gender identity.            Thank you!     Thank you for choosing [facility]  for your care. Our goal is always to provide you with excellent care. Hearing back from our patients is one way we can continue to improve our services. Please take a few minutes to complete the written survey that you may receive in the mail after your visit with us. Thank you!             Your Updated Medication List - Protect others around you: Learn how to safely use, store and throw away your medicines at www.disposemymeds.org.          This list is accurate as of 8/22/18  3:55 PM.  Always use your most recent med list.                   Brand Name Dispense Instructions for use Diagnosis    citalopram 20 MG tablet    celeXA    30 tablet    Take 1/2 tablet (10 mg) for 1-2 weeks, then increase to 1 tablet orally daily    Major depressive disorder, single episode, moderate (H), Irritability and anger, Anxiety

## 2018-08-22 NOTE — NURSING NOTE
"Chief Complaint   Patient presents with     Allied Health Visit     mantoux placed     initial There were no vitals taken for this visit. Estimated body mass index is 24.37 kg/(m^2) as calculated from the following:    Height as of 3/8/17: 5' 6\" (1.676 m).    Weight as of 3/8/17: 151 lb (68.5 kg)..  bp completed using cuff size NA (Not Taken)  SREEKANTH WESLEY LPN  "

## 2018-08-24 ENCOUNTER — ALLIED HEALTH/NURSE VISIT (OUTPATIENT)
Dept: NURSING | Facility: CLINIC | Age: 23
End: 2018-08-24
Payer: COMMERCIAL

## 2018-08-24 DIAGNOSIS — Z11.1 PPD SCREENING TEST: Primary | ICD-10-CM

## 2018-08-24 LAB
PPDINDURATION: 0 MM (ref 0–5)
PPDREDNESS: 0 MM

## 2018-08-24 PROCEDURE — 99207 ZZC NO CHARGE NURSE ONLY: CPT

## 2018-08-24 NOTE — MR AVS SNAPSHOT
After Visit Summary   8/24/2018    Micki Thorpe    MRN: [id number]           Patient Information     Date Of Birth          1995        Visit Information        Provider Department      8/24/2018 4:15 PM Rn, [facility]        Today's Diagnoses     PPD screening test    -  1       Follow-ups after your visit        Who to contact     If you have questions or need follow up information about today's clinic visit or your schedule please contact [facility] directly at [phone].  Normal or non-critical lab and imaging results will be communicated to you by MyChart, letter or phone within 4 business days after the clinic has received the results. If you do not hear from us within 7 days, please contact the clinic through [employer] or phone. If you have a critical or abnormal lab result, we will notify you by phone as soon as possible.  Submit refill requests through [employer] or call your pharmacy and they will forward the refill request to us. Please allow 3 business days for your refill to be completed.          Additional Information About Your Visit        MyChart Information     [employer] gives you secure access to your electronic health record. If you see a primary care provider, you can also send messages to your care team and make appointments. If you have questions, please call your primary care clinic.  If you do not have a primary care provider, please call [phone] and they will assist you.        Care EveryWhere ID     This is your Care EveryWhere ID. This could be used by other organizations to access your [facility] medical records  [id number]         Blood Pressure from Last 3 Encounters:   03/08/17 120/78   06/29/16 130/80   05/18/16 112/70    Weight from Last 3 Encounters:   03/08/17 151 lb (68.5 kg)   06/29/16 165 lb (74.8 kg)   05/18/16 168 lb (76.2 kg)              Today, you had the following     No orders found for display       Primary  Care Provider Office Phone # Fax #    Ashwini Garcia [phone] [address] [facility] [address]        Equal Access to Services     CHAU STANFORD : Hadii luigi ku kmo Soesmeali, waaxda luqadaha, qaybta kaalmada adeegyada, billy martínez laLesleywendie marvin. So [facility] [phone].    ATENCIÓN: Si habla español, tiene a paulino disposición servicios gratuitos de asistencia lingüística. Llame al [phone].    We comply with applicable federal civil rights laws and [place] laws. We do not discriminate on the basis of race, color, national origin, age, disability, sex, sexual orientation, or gender identity.            Thank you!     Thank you for choosing [facility]  for your care. Our goal is always to provide you with excellent care. Hearing back from our patients is one way we can continue to improve our services. Please take a few minutes to complete the written survey that you may receive in the mail after your visit with us. Thank you!             Your Updated Medication List - Protect others around you: Learn how to safely use, store and throw away your medicines at www.disposemymeds.org.          This list is accurate as of 8/24/18  4:47 PM.  Always use your most recent med list.                   Brand Name Dispense Instructions for use Diagnosis    citalopram 20 MG tablet    celeXA    30 tablet    Take 1/2 tablet (10 mg) for 1-2 weeks, then increase to 1 tablet orally daily    Major depressive disorder, single episode, moderate (H), Irritability and anger, Anxiety

## 2018-10-05 ENCOUNTER — OFFICE VISIT (OUTPATIENT)
Dept: FAMILY MEDICINE | Facility: CLINIC | Age: 23
End: 2018-10-05
Payer: COMMERCIAL

## 2018-10-05 VITALS — HEART RATE: 93 BPM | SYSTOLIC BLOOD PRESSURE: 121 MMHG | OXYGEN SATURATION: 99 % | DIASTOLIC BLOOD PRESSURE: 82 MMHG

## 2018-10-05 DIAGNOSIS — L29.9 LOCALIZED PRURITUS: ICD-10-CM

## 2018-10-05 DIAGNOSIS — L21.9 SEBORRHEIC DERMATITIS: ICD-10-CM

## 2018-10-05 DIAGNOSIS — L65.9 HAIR LOSS: Primary | ICD-10-CM

## 2018-10-05 LAB
ERYTHROCYTE [DISTWIDTH] IN BLOOD BY AUTOMATED COUNT: 12.4 % (ref 10–15)
HCT VFR BLD AUTO: 40.9 % (ref 35–47)
HGB BLD-MCNC: 13.6 G/DL (ref 11.7–15.7)
MCH RBC QN AUTO: 29.6 PG (ref 26.5–33)
MCHC RBC AUTO-ENTMCNC: 33.3 G/DL (ref 31.5–36.5)
MCV RBC AUTO: 89 FL (ref 78–100)
PLATELET # BLD AUTO: 234 10E9/L (ref 150–450)
RBC # BLD AUTO: 4.59 10E12/L (ref 3.8–5.2)
WBC # BLD AUTO: 7.8 10E9/L (ref 4–11)

## 2018-10-05 PROCEDURE — 84443 ASSAY THYROID STIM HORMONE: CPT | Performed by: PHYSICIAN ASSISTANT

## 2018-10-05 PROCEDURE — 84425 ASSAY OF VITAMIN B-1: CPT | Mod: 90 | Performed by: PHYSICIAN ASSISTANT

## 2018-10-05 PROCEDURE — 82728 ASSAY OF FERRITIN: CPT | Performed by: PHYSICIAN ASSISTANT

## 2018-10-05 PROCEDURE — 84155 ASSAY OF PROTEIN SERUM: CPT | Performed by: PHYSICIAN ASSISTANT

## 2018-10-05 PROCEDURE — 86038 ANTINUCLEAR ANTIBODIES: CPT | Performed by: PHYSICIAN ASSISTANT

## 2018-10-05 PROCEDURE — 86376 MICROSOMAL ANTIBODY EACH: CPT | Performed by: PHYSICIAN ASSISTANT

## 2018-10-05 PROCEDURE — 82627 DEHYDROEPIANDROSTERONE: CPT | Performed by: PHYSICIAN ASSISTANT

## 2018-10-05 PROCEDURE — 84270 ASSAY OF SEX HORMONE GLOBUL: CPT | Performed by: PHYSICIAN ASSISTANT

## 2018-10-05 PROCEDURE — 85027 COMPLETE CBC AUTOMATED: CPT | Performed by: PHYSICIAN ASSISTANT

## 2018-10-05 PROCEDURE — 83550 IRON BINDING TEST: CPT | Performed by: PHYSICIAN ASSISTANT

## 2018-10-05 PROCEDURE — 84630 ASSAY OF ZINC: CPT | Mod: 90 | Performed by: PHYSICIAN ASSISTANT

## 2018-10-05 PROCEDURE — 36415 COLL VENOUS BLD VENIPUNCTURE: CPT | Performed by: PHYSICIAN ASSISTANT

## 2018-10-05 PROCEDURE — 99214 OFFICE O/P EST MOD 30 MIN: CPT | Performed by: PHYSICIAN ASSISTANT

## 2018-10-05 PROCEDURE — 84134 ASSAY OF PREALBUMIN: CPT | Performed by: PHYSICIAN ASSISTANT

## 2018-10-05 PROCEDURE — 99000 SPECIMEN HANDLING OFFICE-LAB: CPT | Performed by: PHYSICIAN ASSISTANT

## 2018-10-05 PROCEDURE — 84403 ASSAY OF TOTAL TESTOSTERONE: CPT | Performed by: PHYSICIAN ASSISTANT

## 2018-10-05 PROCEDURE — 86431 RHEUMATOID FACTOR QUANT: CPT | Performed by: PHYSICIAN ASSISTANT

## 2018-10-05 PROCEDURE — 82306 VITAMIN D 25 HYDROXY: CPT | Performed by: PHYSICIAN ASSISTANT

## 2018-10-05 PROCEDURE — 83540 ASSAY OF IRON: CPT | Performed by: PHYSICIAN ASSISTANT

## 2018-10-05 RX ORDER — FLUOCINONIDE TOPICAL SOLUTION USP, 0.05% 0.5 MG/ML
SOLUTION TOPICAL
Qty: 60 ML | Refills: 1 | Status: SHIPPED | OUTPATIENT
Start: 2018-10-05 | End: 2019-08-23

## 2018-10-05 RX ORDER — KETOCONAZOLE 20 MG/ML
SHAMPOO TOPICAL
Qty: 120 ML | Refills: 3 | Status: SHIPPED | OUTPATIENT
Start: 2018-10-05 | End: 2019-10-03

## 2018-10-05 NOTE — LETTER
10/5/2018         RE: Micki Thorpe  [address] [address]        Dear Colleague,    Thank you for referring your patient, Micki Thorpe, to the [facility]. Please see a copy of my visit note below.    HPI:  Micki Thorpe is a 23 year old female patient here today for hair loss .  Patient states this has been present for 3 weeks. Just started a new job working over nights. Within the last 2 weeks pt has noticed an itchy scaly scalp as well.  Patient reports the following symptoms: itch .  Patient reports the following previous treatments: OTC head and shoulders with some improvement of rash.  Patient reports the following modifying factors: none.  Associated symptoms: none.  Patient has no other skin complaints today.  Remainder of the HPI, Meds, PMH, Allergies, FH, and SH was reviewed in chart.    Pertinent Hx:   No family history of hair loss  Past Medical History:   Diagnosis Date     Blood type AB+      Cervical high risk HPV (human papillomavirus) test positive 2015    age20, normal pap      (normal spontaneous vaginal delivery) 2014     Status post induction of labor for low ALBERT and post-dates 2014     Streptococcus infection in conditions classified elsewhere and of unspecified site, group A     h/o     Unplanned wanted pregnancy     x 2- both on ocp's - 2nd on micronor        Past Surgical History:   Procedure Laterality Date     APPENDECTOMY  3/09    dr francois     HC REMOVE TONSILS/ADENOIDS,<11 Y/O  10/01    T & A <11y.o.        Family History   Problem Relation Age of Onset     Lipids Mother      Lipids Father      Diabetes Paternal Grandmother      C.A.D. Maternal Grandfather      Lipids Maternal Grandfather      Diabetes Maternal Grandfather      Diabetes Maternal Grandmother      Lipids Maternal Grandmother      C.A.D. Maternal Grandmother      Cancer - colorectal Maternal Grandmother 73       Social History     Social History     Marital  status: Single     Spouse name: N/A     Number of children: N/A     Years of education: N/A     Occupational History      /Manager  [employer]     Social History Main Topics     Smoking status: Never Smoker     Smokeless tobacco: Never Used     Alcohol use Yes      Comment: 3-4 per week avg     Drug use: No     Sexual activity: Yes     Partners: Male     Other Topics Concern     Caffeine Concern Yes     occas     Exercise Yes     Seat Belt Yes     Parent/Sibling W/ Cabg, Mi Or Angioplasty Before 65f 55m? No     Social History Narrative       Outpatient Encounter Prescriptions as of 10/5/2018   Medication Sig Dispense Refill     fluocinonide (LIDEX) 0.05 % solution Apply to scalp BID x 2-4 weeks. Do not use on face. Taper with improvement 60 mL 1     ketoconazole (NIZORAL) 2 % shampoo Wet affected area daily, apply shampoo and lather, let sit for 3-5 minutes and then rinse. 120 mL 3     citalopram (CELEXA) 20 MG tablet Take 1/2 tablet (10 mg) for 1-2 weeks, then increase to 1 tablet orally daily (Patient not taking: Reported on 10/5/2018) 30 tablet 1     No facility-administered encounter medications on file as of 10/5/2018.        Review Of Systems:  Skin: As above  Eyes: negative  Ears/Nose/Throat: negative  Respiratory: No shortness of breath, dyspnea on exertion, cough, or hemoptysis  Cardiovascular: negative  Gastrointestinal: negative  Genitourinary: negative  Musculoskeletal: negative  Neurologic: negative  Psychiatric: negative  Hematologic/Lymphatic/Immunologic: negative  Endocrine: negative      Objective:     /82  Pulse 93  SpO2 99%  Breastfeeding? No  Eyes: Conjunctivae/lids: Normal   ENT: Lips:  Normal  MSK: Normal  Cardiovascular: Peripheral edema none  Pulm: Breathing Normal  Neuro/Psych: Orientation: Normal; Mood/Affect: Normal, NAD, WDWN  Pt accompanied by: self  Following areas examined: face, neck, scalp, hands  Pope skin type:ii   Findings:  Mild flaking of scalp.  No evidence of scarring. Few 2cm terminal hairs at frontal scalp    Assessment and Plan:  1) alopecia and seb derm with pruritis  Telogen effluvium vs systemic vs due to seb derm  Disc labs and biopsy if needed.   Labs today: Check CBC, CUCO, DHEA-S, Ferritin,Iron and iron binding capacity, total protein, total and free T, TSH and T4, vit B1, vit D, zinc, thyroid peroxidase antibody, RF    Wash scalp daily with Nizoral Wet affected area daily, apply shampoo and lather, let sit for 3-5 minutes and then rinse.   Apply Fluocinolone 2x a day to scalp as needed for 2-4 weeks. Tapering with improvement and restarting with flares.    Side effects of topical steroids including but not limited to atrophy (skin thinning), striae (stretch marks) telangiectasias, steroid acne, and others. Do not apply to normal skin. Do not apply to discolored skin that does not have rash present.       Proper skin care from [facility]:    -Eliminate harsh soaps as they strip the natural oils from the skin, often resulting in dry itchy skin ( i.e. Dial, Zest, [place])  -Use mild soaps such as Cetaphil or Dove Sensitive Skin in the shower. You do not need to use soap on arms, legs, and trunk every time you shower unless visibly soiled.   -Avoid hot or cold showers.  -After showering, lightly dry off and apply moisturizing within 2-3 minutes. This will help trap moisture in the skin.   -Aggressive use of a moisturizer at least 1-2 times a day to the entire body (including -Vanicream, Cetaphil, Aquaphor or Cerave) and moisturize hands after every washing.  -We recommend using moisturizers that come in a tub that needs to be scooped out, not a pump. This has more of an oil base. It will hold moisture in your skin much better than a water base moisturizer. The above recommended are non-pore clogging.  Follow up in 2-3 weeks.       Again, thank you for allowing me to participate in the care of your patient.         Sincerely,        Alla Mao PA-C

## 2018-10-05 NOTE — PROGRESS NOTES
HPI:  Micki Thorpe is a 23 year old female patient here today for hair loss .  Patient states this has been present for 3 weeks. Just started a new job working over nights. Within the last 2 weeks pt has noticed an itchy scaly scalp as well.  Patient reports the following symptoms: itch .  Patient reports the following previous treatments: OTC head and shoulders with some improvement of rash.  Patient reports the following modifying factors: none.  Associated symptoms: none.  Patient has no other skin complaints today.  Remainder of the HPI, Meds, PMH, Allergies, FH, and SH was reviewed in chart.    Pertinent Hx:   No family history of hair loss  Past Medical History:   Diagnosis Date     Blood type AB+      Cervical high risk HPV (human papillomavirus) test positive 2015    age18, normal pap      (normal spontaneous vaginal delivery) 2014     Status post induction of labor for low ALBERT and post-dates 2014     Streptococcus infection in conditions classified elsewhere and of unspecified site, group A     h/o     Unplanned wanted pregnancy     x 2- both on ocp's - 2nd on micronor        Past Surgical History:   Procedure Laterality Date     APPENDECTOMY  3/09    dr francois     HC REMOVE TONSILS/ADENOIDS,<11 Y/O  10/01    T & A <11y.o.        Family History   Problem Relation Age of Onset     Lipids Mother      Lipids Father      Diabetes Paternal Grandmother      C.A.D. Maternal Grandfather      Lipids Maternal Grandfather      Diabetes Maternal Grandfather      Diabetes Maternal Grandmother      Lipids Maternal Grandmother      C.A.D. Maternal Grandmother      Cancer - colorectal Maternal Grandmother 73       Social History     Social History     Marital status: Single     Spouse name: N/A     Number of children: N/A     Years of education: N/A     Occupational History      /Manager  [employer]     Social History Main Topics     Smoking status: Never Smoker     Smokeless tobacco:  Never Used     Alcohol use Yes      Comment: 3-4 per week avg     Drug use: No     Sexual activity: Yes     Partners: Male     Other Topics Concern     Caffeine Concern Yes     occas     Exercise Yes     Seat Belt Yes     Parent/Sibling W/ Cabg, Mi Or Angioplasty Before 65f 55m? No     Social History Narrative       Outpatient Encounter Prescriptions as of 10/5/2018   Medication Sig Dispense Refill     fluocinonide (LIDEX) 0.05 % solution Apply to scalp BID x 2-4 weeks. Do not use on face. Taper with improvement 60 mL 1     ketoconazole (NIZORAL) 2 % shampoo Wet affected area daily, apply shampoo and lather, let sit for 3-5 minutes and then rinse. 120 mL 3     citalopram (CELEXA) 20 MG tablet Take 1/2 tablet (10 mg) for 1-2 weeks, then increase to 1 tablet orally daily (Patient not taking: Reported on 10/5/2018) 30 tablet 1     No facility-administered encounter medications on file as of 10/5/2018.        Review Of Systems:  Skin: As above  Eyes: negative  Ears/Nose/Throat: negative  Respiratory: No shortness of breath, dyspnea on exertion, cough, or hemoptysis  Cardiovascular: negative  Gastrointestinal: negative  Genitourinary: negative  Musculoskeletal: negative  Neurologic: negative  Psychiatric: negative  Hematologic/Lymphatic/Immunologic: negative  Endocrine: negative      Objective:     /82  Pulse 93  SpO2 99%  Breastfeeding? No  Eyes: Conjunctivae/lids: Normal   ENT: Lips:  Normal  MSK: Normal  Cardiovascular: Peripheral edema none  Pulm: Breathing Normal  Neuro/Psych: Orientation: Normal; Mood/Affect: Normal, NAD, WDWN  Pt accompanied by: self  Following areas examined: face, neck, scalp, hands  Pope skin type:ii   Findings:  Mild flaking of scalp. No evidence of scarring. Few 2cm terminal hairs at frontal scalp    Assessment and Plan:  1) alopecia and seb derm with pruritis  Telogen effluvium vs systemic vs due to seb derm  Disc labs and biopsy if needed.   Labs today: Check CBC, CUCO,  DHEA-S, Ferritin,Iron and iron binding capacity, total protein, total and free T, TSH and T4, vit B1, vit D, zinc, thyroid peroxidase antibody, RF    Wash scalp daily with Nizoral Wet affected area daily, apply shampoo and lather, let sit for 3-5 minutes and then rinse.   Apply Fluocinolone 2x a day to scalp as needed for 2-4 weeks. Tapering with improvement and restarting with flares.    Side effects of topical steroids including but not limited to atrophy (skin thinning), striae (stretch marks) telangiectasias, steroid acne, and others. Do not apply to normal skin. Do not apply to discolored skin that does not have rash present.       Proper skin care from [facility]:    -Eliminate harsh soaps as they strip the natural oils from the skin, often resulting in dry itchy skin ( i.e. Dial, Zest, [place])  -Use mild soaps such as Cetaphil or Dove Sensitive Skin in the shower. You do not need to use soap on arms, legs, and trunk every time you shower unless visibly soiled.   -Avoid hot or cold showers.  -After showering, lightly dry off and apply moisturizing within 2-3 minutes. This will help trap moisture in the skin.   -Aggressive use of a moisturizer at least 1-2 times a day to the entire body (including -Vanicream, Cetaphil, Aquaphor or Cerave) and moisturize hands after every washing.  -We recommend using moisturizers that come in a tub that needs to be scooped out, not a pump. This has more of an oil base. It will hold moisture in your skin much better than a water base moisturizer. The above recommended are non-pore clogging.  Follow up in 2-3 weeks.

## 2018-10-05 NOTE — MR AVS SNAPSHOT
After Visit Summary   10/5/2018    Micki Thorpe    MRN: [id number]           Patient Information     Date Of Birth          1995        Visit Information        Provider Department      10/5/2018 2:40 PM Alla Mao PA-C [facility]        Today's Diagnoses     Hair loss    -  1    Seborrheic dermatitis        Localized pruritus          Care Instructions    Labs today: Check CBC, CUCO, DHEA-S, Ferritin,Iron and iron binding capacity, total protein, total and free T, TSH and T4, vit B1, vit D, zinc, thyroid peroxidase antibody, RF    Wash scalp daily with Nizoral Wet affected area daily, apply shampoo and lather, let sit for 3-5 minutes and then rinse.   Apply Fluocinolone 2x a day to scalp as needed for 2-4 weeks. Tapering with improvement and restarting with flares.    Side effects of topical steroids including but not limited to atrophy (skin thinning), striae (stretch marks) telangiectasias, steroid acne, and others. Do not apply to normal skin. Do not apply to discolored skin that does not have rash present.       Proper skin care from [facility]:    -Eliminate harsh soaps as they strip the natural oils from the skin, often resulting in dry itchy skin ( i.e. Dial, Zest, [place]eb[place] Spring)  -Use mild soaps such as Cetaphil or Dove Sensitive Skin in the shower. You do not need to use soap on arms, legs, and trunk every time you shower unless visibly soiled.   -Avoid hot or cold showers.  -After showering, lightly dry off and apply moisturizing within 2-3 minutes. This will help trap moisture in the skin.   -Aggressive use of a moisturizer at least 1-2 times a day to the entire body (including -Vanicream, Cetaphil, Aquaphor or Cerave) and moisturize hands after every washing.  -We recommend using moisturizers that come in a tub that needs to be scooped out, not a pump. This has more of an oil base. It will hold moisture in your skin much better than a water  base moisturizer. The above recommended are non-pore clogging.           Follow up in 2-3 weeks.           Follow-ups after your visit        Who to contact     If you have questions or need follow up information about today's clinic visit or your schedule please contact [facility] directly at [phone].  Normal or non-critical lab and imaging results will be communicated to you by MyChart, letter or phone within 4 business days after the clinic has received the results. If you do not hear from us within 7 days, please contact the clinic through [employer] or phone. If you have a critical or abnormal lab result, we will notify you by phone as soon as possible.  Submit refill requests through [employer] or call your pharmacy and they will forward the refill request to us. Please allow 3 business days for your refill to be completed.          Additional Information About Your Visit        MyChart Information     [employer] gives you secure access to your electronic health record. If you see a primary care provider, you can also send messages to your care team and make appointments. If you have questions, please call your primary care clinic.  If you do not have a primary care provider, please call [phone] and they will assist you.        Care EveryWhere ID     This is your Care EveryWhere ID. This could be used by other organizations to access your [facility] medical records  [id number]        Your Vitals Were     Pulse Pulse Oximetry Breastfeeding?             93 99% No          Blood Pressure from Last 3 Encounters:   10/05/18 121/82   03/08/17 120/78   06/29/16 130/80    Weight from Last 3 Encounters:   03/08/17 151 lb (68.5 kg)   06/29/16 165 lb (74.8 kg)   05/18/16 168 lb (76.2 kg)              We Performed the Following     Anti Nuclear Ethel IgG by IFA with Reflex     CBC with platelets     DHEA sulfate     Ferritin     Iron binding capacity     Iron     Prealbumin     Protein total      Rheumatoid factor     Testosterone Free and Total     Thyroid peroxidase antibody     TSH with free T4 reflex     Vitamin B1 whole blood     Vitamin D Deficiency     Zinc          Today's Medication Changes          These changes are accurate as of 10/5/18  3:06 PM.  If you have any questions, ask your nurse or doctor.               Start taking these medicines.        Dose/Directions    fluocinonide 0.05 % solution   Commonly known as:  LIDEX   Used for:  Localized pruritus   Started by:  Alla Mao PA-C        Apply to scalp BID x 2-4 weeks. Do not use on face. Taper with improvement   Quantity:  60 mL   Refills:  1       ketoconazole 2 % shampoo   Commonly known as:  NIZORAL   Used for:  Seborrheic dermatitis   Started by:  Alla Mao PA-C        Wet affected area daily, apply shampoo and lather, let sit for 3-5 minutes and then rinse.   Quantity:  120 mL   Refills:  3            Where to get your medicines      These medications were sent to [facility] [address] [place] [address] 42  AT [address] [address]     Phone:  [phone]     fluocinonide 0.05 % solution    ketoconazole 2 % shampoo                Primary Care Provider Office Phone # Fax #    Ashwini Garcia [phone]       [address] [place] [address]        Equal Access to Services     CHAU STANFORD : Hadbart norman Sosami, waaxda luqadaha, qaybta kaalmada stefano, billy marvin. So [facility] [phone].    ATENCIÓN: Si habla español, tiene a paulino disposición servicios gratuitos de asistencia lingüística. Elmer al [phone].    We comply with applicable federal civil rights laws and [place] laws. We do not discriminate on the basis of race, color, national origin, age, disability, sex, sexual orientation, or gender identity.            Thank you!     Thank you for choosing  [facility]  for your care. Our goal is always to provide you with excellent care. Hearing back from our patients is one way we can continue to improve our services. Please take a few minutes to complete the written survey that you may receive in the mail after your visit with us. Thank you!             Your Updated Medication List - Protect others around you: Learn how to safely use, store and throw away your medicines at www.disposemymeds.org.          This list is accurate as of 10/5/18  3:06 PM.  Always use your most recent med list.                   Brand Name Dispense Instructions for use Diagnosis    citalopram 20 MG tablet    celeXA    30 tablet    Take 1/2 tablet (10 mg) for 1-2 weeks, then increase to 1 tablet orally daily    Major depressive disorder, single episode, moderate (H), Irritability and anger, Anxiety       fluocinonide 0.05 % solution    LIDEX    60 mL    Apply to scalp BID x 2-4 weeks. Do not use on face. Taper with improvement    Localized pruritus       ketoconazole 2 % shampoo    NIZORAL    120 mL    Wet affected area daily, apply shampoo and lather, let sit for 3-5 minutes and then rinse.    Seborrheic dermatitis

## 2018-10-05 NOTE — PATIENT INSTRUCTIONS
Labs today: Check CBC, CUCO, DHEA-S, Ferritin,Iron and iron binding capacity, total protein, total and free T, TSH and T4, vit B1, vit D, zinc, thyroid peroxidase antibody, RF    Wash scalp daily with Nizoral Wet affected area daily, apply shampoo and lather, let sit for 3-5 minutes and then rinse.   Apply Fluocinolone 2x a day to scalp as needed for 2-4 weeks. Tapering with improvement and restarting with flares.    Side effects of topical steroids including but not limited to atrophy (skin thinning), striae (stretch marks) telangiectasias, steroid acne, and others. Do not apply to normal skin. Do not apply to discolored skin that does not have rash present.       Proper skin care from [facility]:    -Eliminate harsh soaps as they strip the natural oils from the skin, often resulting in dry itchy skin ( i.e. Dial, Zest, Pebbles Spring)  -Use mild soaps such as Cetaphil or Dove Sensitive Skin in the shower. You do not need to use soap on arms, legs, and trunk every time you shower unless visibly soiled.   -Avoid hot or cold showers.  -After showering, lightly dry off and apply moisturizing within 2-3 minutes. This will help trap moisture in the skin.   -Aggressive use of a moisturizer at least 1-2 times a day to the entire body (including -Vanicream, Cetaphil, Aquaphor or Cerave) and moisturize hands after every washing.  -We recommend using moisturizers that come in a tub that needs to be scooped out, not a pump. This has more of an oil base. It will hold moisture in your skin much better than a water base moisturizer. The above recommended are non-pore clogging.           Follow up in 2-3 weeks.

## 2018-10-06 LAB
FERRITIN SERPL-MCNC: 17 NG/ML (ref 12–150)
IRON SERPL-MCNC: 78 UG/DL (ref 35–180)
PROT SERPL-MCNC: 8.1 G/DL (ref 6.8–8.8)
TIBC SERPL-MCNC: 346 UG/DL (ref 240–430)
TSH SERPL DL<=0.005 MIU/L-ACNC: 2.11 MU/L (ref 0.4–4)

## 2018-10-08 LAB
ANA SER QL IF: NEGATIVE
DEPRECATED CALCIDIOL+CALCIFEROL SERPL-MC: 36 UG/L (ref 20–75)
DHEA-S SERPL-MCNC: 241 UG/DL (ref 35–430)
PREALB SERPL IA-MCNC: 24 MG/DL (ref 15–45)
RHEUMATOID FACT SER NEPH-ACNC: <20 IU/ML (ref 0–20)
THYROPEROXIDASE AB SERPL-ACNC: <10 IU/ML

## 2018-10-09 ENCOUNTER — TELEPHONE (OUTPATIENT)
Dept: DERMATOLOGY | Facility: CLINIC | Age: 23
End: 2018-10-09

## 2018-10-09 LAB
SHBG SERPL-SCNC: 51 NMOL/L (ref 30–135)
TESTOST FREE SERPL-MCNC: 0.21 NG/DL (ref 0.08–0.74)
TESTOST SERPL-MCNC: 16 NG/DL (ref 8–60)

## 2018-10-09 NOTE — TELEPHONE ENCOUNTER
Notes Recorded by Ernesto Clay MD on 10/9/2018 at 2:32 PM  Most of your labs for hair growth are normal    Your ferritin level was low for hair growth  Eat a balanced diet that includes plenty of iron. Sources include red meat, spinach, broccoli, prune juice, kidney beans and chickpeas.     Add Vitamin C-rich foodsto your diet to help boost absorption of iron: Vitamin C sources are citrus fruits and juices, berries, green peppers, tomatoes, broccoli and spinach.     On option is Iron supplementation. The use of an iron supplement may help low ferritin hair loss by adding more iron to your system. The rationale for iron supplementation is that once the serum level is raised to the necessary threshold point, hair growth will resume. However, one of the biggest drawbacks to this type of treatment is that it takes a long time, because ferritin levels must be increased gradually. Allow 2-3 months for hair to stop shedding, and 6 - 12 months for new hairs to become long enough to make a visible impact. Woman usually needs between 18 and 30 mg daily    Excess iron can accumulate in the body. This increases the risk of heart disease, cancer, and some other conditions that are worse than hair loss.  Many people find that eating red meat is a better option than pills when serum ferritin levels are low. The iron in red meat is absorbed well.   If you need to take iron supplements, do not take them at the same time as vitamin E, Antacids, tea or coffee. And choose an organic form of iron such as ferrous gluconate or ferrous fulmerate. Inorganic forms of iron, such as ferrous sulfate, can oxidize vitamin E. Only about 10% of ingested iron is absorbed into the blood each day, and this is dependent on the type of food in the diet. Take after food to reduce GI side effects. Continue treatment for 3 months after the hemoglobin reruns to normal so as to ensure replenish the storage.

## 2018-10-09 NOTE — TELEPHONE ENCOUNTER
[employer] Message Sent:    Lance Sweet-     Here are the things we spoke about on the phone. If you have any questions, you can call me back at the number below.   Thanks,   OSCAR Chisholm-BSN   [facility]   [phone]     Most of your labs for hair growth are normal.     Your ferritin level was low for hair growth   Eat a balanced diet that includes plenty of iron. Sources include red meat, spinach, broccoli, prune juice, kidney beans and chickpeas.     Add Vitamin C-rich foodsto your diet to help boost absorption of iron: Vitamin C sources are citrus fruits and juices, berries, green peppers, tomatoes, broccoli and spinach.     On option is Iron supplementation. The use of an iron supplement may help low ferritin hair loss by adding more iron to your system. The rationale for iron supplementation is that once the serum level is raised to the necessary threshold point, hair growth will resume. However, one of the biggest drawbacks to this type of treatment is that it takes a long time, because ferritin levels must be increased gradually. Allow 2-3 months for hair to stop shedding, and 6 - 12 months for new hairs to become long enough to make a visible impact. Woman usually needs between 18 and 30 mg daily     Excess iron can accumulate in the body. This increases the risk of heart disease, cancer, and some other conditions that are worse than hair loss.   Many people find that eating red meat is a better option than pills when serum ferritin levels are low. The iron in red meat is absorbed well.   If you need to take iron supplements, do not take them at the same time as vitamin E, Antacids, tea or coffee. And choose an organic form of iron such as ferrous gluconate or ferrous fulmerate. Inorganic forms of iron, such as ferrous sulfate, can oxidize vitamin E. Only about 10% of ingested iron is absorbed into the blood each day, and this is dependent on the type of food in the diet. Take after food to reduce  GI side effects. Continue treatment for 3 months after the hemoglobin reruns to normal so as to ensure replenish the storage.

## 2018-10-09 NOTE — TELEPHONE ENCOUNTER
Called and spoke to patient. Educated patient on the following:    Most of your labs for hair growth are normal.    Your ferritin level was low for hair growth  Eat a balanced diet that includes plenty of iron. Sources include red meat, spinach, broccoli, prune juice, kidney beans and chickpeas.     Add Vitamin C-rich foodsto your diet to help boost absorption of iron: Vitamin C sources are citrus fruits and juices, berries, green peppers, tomatoes, broccoli and spinach.     On option is Iron supplementation. The use of an iron supplement may help low ferritin hair loss by adding more iron to your system. The rationale for iron supplementation is that once the serum level is raised to the necessary threshold point, hair growth will resume. However, one of the biggest drawbacks to this type of treatment is that it takes a long time, because ferritin levels must be increased gradually. Allow 2-3 months for hair to stop shedding, and 6 - 12 months for new hairs to become long enough to make a visible impact. Woman usually needs between 18 and 30 mg daily    Excess iron can accumulate in the body. This increases the risk of heart disease, cancer, and some other conditions that are worse than hair loss.  Many people find that eating red meat is a better option than pills when serum ferritin levels are low. The iron in red meat is absorbed well.   If you need to take iron supplements, do not take them at the same time as vitamin E, Antacids, tea or coffee. And choose an organic form of iron such as ferrous gluconate or ferrous fulmerate. Inorganic forms of iron, such as ferrous sulfate, can oxidize vitamin E. Only about 10% of ingested iron is absorbed into the blood each day, and this is dependent on the type of food in the diet. Take after food to reduce GI side effects. Continue treatment for 3 months after the hemoglobin reruns to normal so as to ensure replenish the storage.    Patient voiced  Phan arceo RN-BSN  [facility]  [phone]

## 2018-10-10 ENCOUNTER — TELEPHONE (OUTPATIENT)
Dept: DERMATOLOGY | Facility: CLINIC | Age: 23
End: 2018-10-10

## 2018-10-10 LAB — ZINC SERPL-MCNC: 82 UG/DL (ref 60–120)

## 2018-10-10 NOTE — TELEPHONE ENCOUNTER
Called and LM for patient to call back in regards to lab results.    Phan RN-BSN  [facility]  [phone]

## 2018-10-11 LAB — VIT B1 BLD-MCNC: 125 NMOL/L (ref 70–180)

## 2018-10-12 NOTE — TELEPHONE ENCOUNTER
[employer] Message Sent:    Lance Sweet-    Your zinc levels came back normal.    Let me know if you have any questions,     OSCAR Chisholm-BSN  [facility]  [phone]

## 2019-08-23 ENCOUNTER — OFFICE VISIT (OUTPATIENT)
Dept: DERMATOLOGY | Facility: CLINIC | Age: 24
End: 2019-08-23
Payer: COMMERCIAL

## 2019-08-23 VITALS — HEART RATE: 108 BPM | DIASTOLIC BLOOD PRESSURE: 85 MMHG | SYSTOLIC BLOOD PRESSURE: 131 MMHG | OXYGEN SATURATION: 97 %

## 2019-08-23 DIAGNOSIS — L21.9 DERMATITIS, SEBORRHEIC: ICD-10-CM

## 2019-08-23 DIAGNOSIS — L82.1 SEBORRHEIC KERATOSIS: ICD-10-CM

## 2019-08-23 DIAGNOSIS — D18.01 ANGIOMA OF SKIN: ICD-10-CM

## 2019-08-23 DIAGNOSIS — D22.9 NEVUS: ICD-10-CM

## 2019-08-23 DIAGNOSIS — L81.4 LENTIGO: ICD-10-CM

## 2019-08-23 DIAGNOSIS — D48.5 NEOPLASM OF UNCERTAIN BEHAVIOR OF SKIN: Primary | ICD-10-CM

## 2019-08-23 PROCEDURE — 99214 OFFICE O/P EST MOD 30 MIN: CPT | Mod: 25 | Performed by: PHYSICIAN ASSISTANT

## 2019-08-23 PROCEDURE — 11106 INCAL BX SKN SINGLE LES: CPT | Performed by: PHYSICIAN ASSISTANT

## 2019-08-23 PROCEDURE — 88305 TISSUE EXAM BY PATHOLOGIST: CPT | Mod: TC | Performed by: PHYSICIAN ASSISTANT

## 2019-08-23 RX ORDER — FLUOCINONIDE TOPICAL SOLUTION USP, 0.05% 0.5 MG/ML
SOLUTION TOPICAL
Qty: 60 ML | Refills: 3 | Status: ON HOLD | OUTPATIENT
Start: 2019-08-23 | End: 2020-05-03

## 2019-08-23 NOTE — PROGRESS NOTES
HPI:   Chief complaints: Micki Thorpe is a 23 year old female who presents for Full skin cancer screening to rule out skin cancer   Last Skin Exam: n/a      1st Baseline: yes  Personal HX of Skin Cancer: no   Personal HX of Malignant Melanoma: no   Family HX of Skin Cancer / Malignant Melanoma: Maternal aunt with possible h/o skin CA  Personal HX of Atypical Moles:   no  Risk factors: history of sun exposure and burns; regular tanning bed use in the past  New / Changing lesions: has a mole on her scalp she was told to watch but she is unable to see it  Social History: Has a 4 yo daugher and a 3 yo son; thinking of having 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 Works as a night dulla   On review of systems, there are no further skin complaints, patient is feeling otherwise well.  See patient intake sheet.  ROS of the following were done and are negative: Constitutional, Eyes, Ears, Nose,   Mouth, Throat, Cardiovascular, Respiratory, GI, Genitourinary, Musculoskeletal,   Psychiatric, Endocrine, Allergic/Immunologic.    PHYSICAL EXAM:   /85   Pulse 108   SpO2 97%   Breastfeeding? No   Skin exam performed as follows: Type 2 skin. Mood appropriate  Alert and Oriented X 3. Well developed, well nourished in no distress.  General appearance: Normal  Head including face: Normal  Eyes: conjunctiva and lids: Normal  Mouth: Lips, teeth, gums: Normal  Neck: Normal  Chest-breast/axillae: Normal  Back: Normal  Spleen and liver: Normal  Cardiovascular: Exam of peripheral vascular system by observation for swelling, varicosities, edema: Normal  Genitalia: groin, buttocks: Normal  Extremities: digits/nails (clubbing): Normal  Eccrine and Apocrine glands: Normal  Right upper extremity: Normal  Left upper extremity: Normal  Right lower extremity: Normal  Left lower extremity: Normal  Skin: Scalp and body hair: See below    Pt deferred exam of breasts, groin, buttocks: No    Other physical findings:  1. Multiple pigmented macules on extremities and  trunk  2. Multiple pigmented macules on face, trunk and extremities  3. Multiple vascular papules on trunk, arms and legs  4. Multiple scattered keratotic plaques  5. 8 mm irregular brown macule on the left abdomen  6. Flaking on scalp       Except as noted above, no other signs of skin cancer or melanoma.     ASSESSMENT/PLAN:   Benign Full skin cancer screening today. . Patient with history of none  Advised on monthly self exams and 1 year  Patient Education: Appropriate brochures given.    1. Multiple benign appearing nevi on arms, legs and trunk. Discussed ABCDEs of melanoma and sunscreen.   2. Multiple lentigos on arms, legs and trunk. Advised benign, no treatment needed.  3. Multiple scattered angiomas. Advised benign, no treatment needed.   4. Seborrheic keratosis on arms, legs and trunk. Advised benign, no treatment needed.  5. Atypical nevus on the left abdomen - advised. Anesthestized with lidocaine and area cleaned with betadine. Shave removal/biopsy performed and specimen placed in formalin. Patient will receive call with results.   6. Seborrheic dermatitis - advised on chronic, recurrent condition. Discussed that it is a reaction to the normal yeast on the skin. Has tried ketoconazole shampoo which did not work for her and lidex which did work for her in the past. Continue lidex BID x 2-4 weeks then PRN  7. History of hair loss - still feels like she is shedding hair, although is not noticing a reduction in her overall hair density. Labs were normal except low ferritin at LOV. Discussed scalp biopsy to definitively rule out scarring alopecia; she declines this today. Will return if she has scalp pain or changes in her hair shedding.             Follow-up: yearly FSE/PRN sooner    1.) Patient was asked about new and changing moles. YES  2.) Patient received a complete physical skin examination: YES  3.) Patient was counseled to perform a monthly self skin examination: YES  Scribed By: Shona Barnes MS,  MIAN

## 2019-08-23 NOTE — LETTER
8/23/2019         RE: Micki Thorpe  [address] [facility] [address]        Dear Colleague,    Thank you for referring your patient, Micki Thorpe, to the [facility]. Please see a copy of my visit note below.    HPI:   Chief complaints: Micki Thorpe is a 23 year old female who presents for Full skin cancer screening to rule out skin cancer   Last Skin Exam: n/a      1st Baseline: yes  Personal HX of Skin Cancer: no   Personal HX of Malignant Melanoma: no   Family HX of Skin Cancer / Malignant Melanoma: Maternal aunt with possible h/o skin CA  Personal HX of Atypical Moles:   no  Risk factors: history of sun exposure and burns; regular tanning bed use in the past  New / Changing lesions: has a mole on her scalp she was told to watch but she is unable to see it  Social History: Has a 6 yo daugher and a 3 yo son; thinking of having 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 Works as a night dulla   On review of systems, there are no further skin complaints, patient is feeling otherwise well.  See patient intake sheet.  ROS of the following were done and are negative: Constitutional, Eyes, Ears, Nose,   Mouth, Throat, Cardiovascular, Respiratory, GI, Genitourinary, Musculoskeletal,   Psychiatric, Endocrine, Allergic/Immunologic.    PHYSICAL EXAM:   /85   Pulse 108   SpO2 97%   Breastfeeding? No   Skin exam performed as follows: Type 2 skin. Mood appropriate  Alert and Oriented X 3. Well developed, well nourished in no distress.  General appearance: Normal  Head including face: Normal  Eyes: conjunctiva and lids: Normal  Mouth: Lips, teeth, gums: Normal  Neck: Normal  Chest-breast/axillae: Normal  Back: Normal  Spleen and liver: Normal  Cardiovascular: Exam of peripheral vascular system by observation for swelling, varicosities, edema: Normal  Genitalia: groin, buttocks: Normal  Extremities: digits/nails (clubbing): Normal  Eccrine and Apocrine glands: Normal  Right upper extremity: Normal  Left  upper extremity: Normal  Right lower extremity: Normal  Left lower extremity: Normal  Skin: Scalp and body hair: See below    Pt deferred exam of breasts, groin, buttocks: No    Other physical findings:  1. Multiple pigmented macules on extremities and trunk  2. Multiple pigmented macules on face, trunk and extremities  3. Multiple vascular papules on trunk, arms and legs  4. Multiple scattered keratotic plaques  5. 8 mm irregular brown macule on the left abdomen  6. Flaking on scalp       Except as noted above, no other signs of skin cancer or melanoma.     ASSESSMENT/PLAN:   Benign Full skin cancer screening today. . Patient with history of none  Advised on monthly self exams and 1 year  Patient Education: Appropriate brochures given.    1. Multiple benign appearing nevi on arms, legs and trunk. Discussed ABCDEs of melanoma and sunscreen.   2. Multiple lentigos on arms, legs and trunk. Advised benign, no treatment needed.  3. Multiple scattered angiomas. Advised benign, no treatment needed.   4. Seborrheic keratosis on arms, legs and trunk. Advised benign, no treatment needed.  5. Atypical nevus on the left abdomen - advised. Anesthestized with lidocaine and area cleaned with betadine. Shave removal/biopsy performed and specimen placed in formalin. Patient will receive call with results.   6. Seborrheic dermatitis - advised on chronic, recurrent condition. Discussed that it is a reaction to the normal yeast on the skin. Has tried ketoconazole shampoo which did not work for her and lidex which did work for her in the past. Continue lidex BID x 2-4 weeks then PRN  7. History of hair loss - still feels like she is shedding hair, although is not noticing a reduction in her overall hair density. Labs were normal except low ferritin at LOV. Discussed scalp biopsy to definitively rule out scarring alopecia; she declines this today. Will return if she has scalp pain or changes in her hair shedding.             Follow-up:  yearly FSE/PRN sooner    1.) Patient was asked about new and changing moles. YES  2.) Patient received a complete physical skin examination: YES  3.) Patient was counseled to perform a monthly self skin examination: YES  Scribed By: Shona Barnes, MS, PA-C        Again, thank you for allowing me to participate in the care of your patient.        Sincerely,        Shona Barnes PA-C

## 2019-08-23 NOTE — PATIENT INSTRUCTIONS
Wound Care Instructions     FOR SUPERFICIAL WOUNDS     [facility] [phone]    [facility] [phone]                       AFTER 24 HOURS YOU SHOULD REMOVE THE BANDAGE AND BEGIN DAILY DRESSING CHANGES AS FOLLOWS:     1) Remove Dressing.     2) Clean and dry the area with tap water using a Q-tip or sterile gauze pad.     3) Apply Vaseline, Aquaphor, Polysporin ointment or Bacitracin ointment over entire wound.  Do NOT use Neosporin ointment.     4) Cover the wound with a band-aid, or a sterile non-stick gauze pad and micropore paper tape      REPEAT THESE INSTRUCTIONS AT LEAST ONCE A DAY UNTIL THE WOUND HAS COMPLETELY HEALED.    It is an old wives tale that a wound heals better when it is exposed to air and allowed to dry out. The wound will heal faster with a better cosmetic result if it is kept moist with ointment and covered with a bandage.    **Do not let the wound dry out.**      Supplies Needed:      *Cotton tipped applicators (Q-tips)    *Polysporin Ointment or Bacitracin Ointment (NOT NEOSPORIN)    *Band-aids or non-stick gauze pads and micropore paper tape.      PATIENT INFORMATION:    During the healing process you will notice a number of changes. All wounds develop a small halo of redness surrounding the wound.  This means healing is occurring. Severe itching with extensive redness usually indicates sensitivity to the ointment or bandage tape used to dress the wound.  You should call our office if this develops.      Swelling  and/or discoloration around your surgical site is common, particularly when performed around the eye.    All wounds normally drain.  The larger the wound the more drainage there will be.  After 7-10 days, you will notice the wound beginning to shrink and new skin will begin to grow.  The wound is healed when you can see skin has formed over the entire area.  A healed wound has a healthy, shiny look to the surface and is red to dark pink in color  to normalize.  Wounds may take approximately 4-6 weeks to heal.  Larger wounds may take 6-8 weeks.  After the wound is healed you may discontinue dressing changes.    You may experience a sensation of tightness as your wound heals. This is normal and will gradually subside.    Your healed wound may be sensitive to temperature changes. This sensitivity improves with time, but if you re having a lot of discomfort, try to avoid temperature extremes.    Patients frequently experience itching after their wound appears to have healed because of the continue healing under the skin.  Plain Vaseline will help relieve the itching.        POSSIBLE COMPLICATIONS    BLEEDIN. Leave the bandage in place.  2. Use tightly rolled up gauze or a cloth to apply direct pressure over the bandage for 30  minutes.  3. Reapply pressure for an additional 30 minutes if necessary  4. Use additional gauze and tape to maintain pressure once the bleeding has stopped.

## 2019-08-27 ENCOUNTER — TELEPHONE (OUTPATIENT)
Dept: DERMATOLOGY | Facility: CLINIC | Age: 24
End: 2019-08-27

## 2019-08-27 LAB — COPATH REPORT: NORMAL

## 2019-08-27 NOTE — TELEPHONE ENCOUNTER
----- Message from Shona Barnes PA-C sent at 8/27/2019  4:08 PM CDT -----  Left abdomen moderately to severely atypical nevus which will need reexcision. Please schedule for excision within the next 4 weeks.

## 2019-08-27 NOTE — TELEPHONE ENCOUNTER
Called and LM for patient to call back in regards to biopsy results.     Phan RN-BSN-PHN  [facility]  [phone]

## 2019-08-27 NOTE — LETTER
[facility]  [address]  [address]  [phone]    8/28/2019       Micki Thorpe  [address] [facility] [address] [facility] [address]      Dear Micki:    You are scheduled for Mohs Surgery on: 9/12/19 @7:00am.    Please check in at [facility], [address].     You don't need to arrive more than 5-10 minutes prior to your appointment time.     Be sure to eat a good breakfast and bathe and wash your hair prior to surgery.     If you are taking any anti-coagulants that are prescribed by your Doctor (such as Coumadin/Warfarin, Plavix, Aspirin, Ibuprofen), please continue taking them.     However, if you are taking anti-coagulants over the counter without a Doctor's order for a medical condition, please discontinue them 10 days prior to surgery.     Please wear loose comfortable clothing as it could possibly be 4-6 hours until your surgery is completed depending upon how many layers of tissue need to be removed.      Thank you,    LISETTE Clay MD

## 2019-08-28 NOTE — TELEPHONE ENCOUNTER
Called and spoke to patient. Educated patient on biopsy results- moderately to severely atypical nevus. Educated patient on moderately to severely atypical nevus, mohs, scheduled mohs, and letter/packet sent. Patient voiced understanding.    OSCAR Chisholm-BSN-N  [facility]  [phone]

## 2019-09-05 ENCOUNTER — TELEPHONE (OUTPATIENT)
Dept: FAMILY MEDICINE | Facility: CLINIC | Age: 24
End: 2019-09-05

## 2019-09-05 ENCOUNTER — OFFICE VISIT (OUTPATIENT)
Dept: FAMILY MEDICINE | Facility: CLINIC | Age: 24
End: 2019-09-05
Payer: COMMERCIAL

## 2019-09-05 VITALS
HEIGHT: 66 IN | OXYGEN SATURATION: 98 % | WEIGHT: 180 LBS | HEART RATE: 103 BPM | BODY MASS INDEX: 28.93 KG/M2 | TEMPERATURE: 98.4 F | DIASTOLIC BLOOD PRESSURE: 84 MMHG | SYSTOLIC BLOOD PRESSURE: 118 MMHG

## 2019-09-05 DIAGNOSIS — Z32.01 PREGNANCY TEST POSITIVE: ICD-10-CM

## 2019-09-05 DIAGNOSIS — N92.6 MISSED PERIOD: ICD-10-CM

## 2019-09-05 DIAGNOSIS — R39.89 URINARY PROBLEM: Primary | ICD-10-CM

## 2019-09-05 LAB — HCG UR QL: POSITIVE

## 2019-09-05 PROCEDURE — 81025 URINE PREGNANCY TEST: CPT | Performed by: NURSE PRACTITIONER

## 2019-09-05 PROCEDURE — 99213 OFFICE O/P EST LOW 20 MIN: CPT | Performed by: NURSE PRACTITIONER

## 2019-09-05 ASSESSMENT — PATIENT HEALTH QUESTIONNAIRE - PHQ9
SUM OF ALL RESPONSES TO PHQ QUESTIONS 1-9: 1
5. POOR APPETITE OR OVEREATING: NOT AT ALL

## 2019-09-05 ASSESSMENT — ANXIETY QUESTIONNAIRES
5. BEING SO RESTLESS THAT IT IS HARD TO SIT STILL: NOT AT ALL
3. WORRYING TOO MUCH ABOUT DIFFERENT THINGS: NOT AT ALL
1. FEELING NERVOUS, ANXIOUS, OR ON EDGE: NOT AT ALL
IF YOU CHECKED OFF ANY PROBLEMS ON THIS QUESTIONNAIRE, HOW DIFFICULT HAVE THESE PROBLEMS MADE IT FOR YOU TO DO YOUR WORK, TAKE CARE OF THINGS AT HOME, OR GET ALONG WITH OTHER PEOPLE: NOT DIFFICULT AT ALL
2. NOT BEING ABLE TO STOP OR CONTROL WORRYING: NOT AT ALL
GAD7 TOTAL SCORE: 0
7. FEELING AFRAID AS IF SOMETHING AWFUL MIGHT HAPPEN: NOT AT ALL
6. BECOMING EASILY ANNOYED OR IRRITABLE: NOT AT ALL

## 2019-09-05 ASSESSMENT — MIFFLIN-ST. JEOR: SCORE: 1583.22

## 2019-09-05 NOTE — PROGRESS NOTES
Subjective     Micki Thorpe is a 23 year old female who presents to clinic today for the following health issues:    HPI     Confirmation of pregnancy- planned pregnancy. Has two older kids girl, 5 and boy 3.     Has had 3 positive tests at home.  Only symptom is being tired. She did have clear pink discharge twice about a week or two ago, negative for abdominal pain, has been on prenatals with DHA.  LMP-  2019  Approximate Due Date based on LMP 2020    [facility]: Dr. Garcia    Patient Active Problem List   Diagnosis     Blood type AB+ - no need for rhogam     CARDIOVASCULAR SCREENING; LDL GOAL LESS THAN 160     Unplanned wanted pregnancy - x 2- both on ocp's - 2nd on micronor      Cervical high risk HPV (human papillomavirus) test positive     Anxiety     Major depressive disorder, single episode, moderate (H)     Irritability and anger     Past Surgical History:   Procedure Laterality Date     APPENDECTOMY  3/09    dr francois      REMOVE TONSILS/ADENOIDS,<11 Y/O  10/01    T & A <11y.o.       Social History     Tobacco Use     Smoking status: Never Smoker     Smokeless tobacco: Never Used   Substance Use Topics     Alcohol use: Not Currently     Comment: 3-4 per week avg     Family History   Problem Relation Age of Onset     Lipids Mother      Lipids Father      Diabetes Paternal Grandmother      C.A.D. Maternal Grandfather      Lipids Maternal Grandfather      Diabetes Maternal Grandfather      Diabetes Maternal Grandmother      Lipids Maternal Grandmother      C.A.D. Maternal Grandmother      Cancer - colorectal Maternal Grandmother 73     Skin Cancer Maternal Aunt      Familial Atypical Mole-Malignant Melanoma Syndrome Paternal Cousin          Current Outpatient Medications   Medication Sig Dispense Refill     fluocinonide (LIDEX) 0.05 % external solution Apply to scalp BID x 2-4 weeks. Do not use on face. Taper with improvement 60 mL 3     ketoconazole (NIZORAL) 2 %  "shampoo Wet affected area daily, apply shampoo and lather, let sit for 3-5 minutes and then rinse. (Patient not taking: Reported on 8/23/2019) 120 mL 3     No Known Allergies      Reviewed and updated as needed this visit by Provider         Review of Systems   ROS COMP: CONSTITUTIONAL: NEGATIVE for fever, chills, change in weight  EYES: NEGATIVE for vision changes or irritation  ENT/MOUTH: NEGATIVE for ear, mouth and throat problems  RESP: NEGATIVE for significant cough or SOB  CV: NEGATIVE for chest pain, palpitations or peripheral edema  GI: NEGATIVE for nausea, abdominal pain, heartburn, or change in bowel habits  : NEGATIVE for frequency, dysuria, or hematuria  NEURO: NEGATIVE for weakness, dizziness or paresthesias  PSYCHIATRIC: NEGATIVE for changes in mood or affect      Objective    /84 (BP Location: Right arm, Patient Position: Sitting, Cuff Size: Adult Regular)   Pulse 103   Temp 98.4  F (36.9  C) (Oral)   Ht 1.676 m (5' 6\")   Wt 81.6 kg (180 lb)   SpO2 98%   BMI 29.05 kg/m    Body mass index is 29.05 kg/m .  Physical Exam   GENERAL: healthy, alert and no acute distress  RESP: lungs clear to auscultation - no rales, rhonchi or wheezes  CV: regular rate and rhythm, normal S1 S2, no S3 or S4, no murmur, click or rub, no peripheral edema   ABDOMEN: no masses, non-tender to palpation, bowel sounds normal  PSYCH: mentation appears normal, affect normal/bright    Diagnostic Test Results:  Labs reviewed in Epic  Results for orders placed or performed in visit on 09/05/19 (from the past 24 hour(s))   HCG Qual, Urine ([id number])   Result Value Ref Range    HCG Qual Urine Positive (A) NEG^Negative           Assessment & Plan       1. Missed period/ Pregnancy test positive  Patient is approximately 5 weeks pregnant, no concerns at this time, had two prior healthy pregnancies. Encouraged moderate exercise and healthy diet. Advised to be seen if she develops cramping and/or bleeding.  - HCG Qual, Urine " "([id number])  -  OB <14 Weeks w Transvaginal Single; Future - schedule after 9/24/19.       BMI:   Estimated body mass index is 29.05 kg/m  as calculated from the following:    Height as of this encounter: 1.676 m (5' 6\").    Weight as of this encounter: 81.6 kg (180 lb).   Weight management plan: Discussed healthy diet and exercise guidelines        FUTURE APPOINTMENTS:       - Follow-up visit in 4 weeks with OB    Return in about 4 weeks (around 10/3/2019) for 1st prenatal visit.      ELSI Ruiz [facility]      "

## 2019-09-05 NOTE — TELEPHONE ENCOUNTER
Patient calling to schedule an appt for the Week of October 3rd for her 1st prenatal appt, 9 weeks at that point. She would like to see Dr. Garcia. Please call to schedule  [phone] (home)   Thank you  Arminda Adams

## 2019-09-05 NOTE — PROGRESS NOTES
"Subjective     Micki Thorpe is a 23 year old female who presents to clinic today for the following health issues:    HPI   URINARY TRACT SYMPTOMS  Onset: ***    Description:   Painful urination (Dysuria): { :000652}           Frequency: { :097879}  Blood in urine (Hematuria): { :816166}  Delay in urine (Hesitency): { :901812}    Intensity: {.:846421}    Progression of Symptoms:  {.:845330}    Accompanying Signs & Symptoms:  Fever/chills: { :835872}  Flank pain { :423657}  Nausea and vomiting: { :142244}  Any vaginal symptoms: {.:368205::\"none\"}  Abdominal/Pelvic Pain: { :394363}    History:   History of frequent UTI's: { :286569}  History of kidney stones: { :496531}  Sexually Active: { :374054}  Possibility of pregnancy: { :844159::\"No\"}    Precipitating factors:   ***    Therapies Tried and outcome: {UTI sx treat:204089::\"***\"}  {additonal problems for provider to add (Optional):429621}    {HIST REVIEW/ LINKS 2 (Optional):328360}    {Additional problems for the provider to add (optional):818808}  Reviewed and updated as needed this visit by Provider         Review of Systems   {ROS COMP (Optional):913324}      Objective    There were no vitals taken for this visit.  There is no height or weight on file to calculate BMI.  Physical Exam   {Exam List (Optional):064716}    {Diagnostic Test Results (Optional):657944::\"Diagnostic Test Results:\",\"Labs reviewed in Epic\"}        {PROVIDER CHARTING PREFERENCE:961008}      "

## 2019-09-06 ASSESSMENT — ANXIETY QUESTIONNAIRES: GAD7 TOTAL SCORE: 0

## 2019-09-12 ENCOUNTER — OFFICE VISIT (OUTPATIENT)
Dept: DERMATOLOGY | Facility: CLINIC | Age: 24
End: 2019-09-12
Payer: COMMERCIAL

## 2019-09-12 VITALS — OXYGEN SATURATION: 99 % | HEART RATE: 86 BPM | DIASTOLIC BLOOD PRESSURE: 80 MMHG | SYSTOLIC BLOOD PRESSURE: 128 MMHG

## 2019-09-12 DIAGNOSIS — D48.5 NEOPLASM OF UNCERTAIN BEHAVIOR OF SKIN: Primary | ICD-10-CM

## 2019-09-12 PROCEDURE — 88342 IMHCHEM/IMCYTCHM 1ST ANTB: CPT | Performed by: DERMATOLOGY

## 2019-09-12 PROCEDURE — 11602 EXC TR-EXT MAL+MARG 1.1-2 CM: CPT | Performed by: DERMATOLOGY

## 2019-09-12 PROCEDURE — 88331 PATH CONSLTJ SURG 1 BLK 1SPC: CPT | Performed by: DERMATOLOGY

## 2019-09-12 NOTE — LETTER
2019         RE: Micki Thorpe  [address] [address]        Dear Colleague,    Thank you for referring your patient, Micki Thorpe, to the [facility]. Please see a copy of my visit note below.    Surgical Office Location:  [facility]  [address] [place] [id number]      Micki Thorpe is a 23 year old year old female patient here today for evaluation and managment of severly atypical nevus on left ab.  Patient reports the following modifying factors none.  Associated symptoms: none.  Patient has no other skin complaints today.  Remainder of the HPI, Meds, PMH, Allergies, FH, and SH was reviewed in chart.      Past Medical History:   Diagnosis Date     Blood type AB+      Cervical high risk HPV (human papillomavirus) test positive 2015    age18, normal pap     Depressive disorder 3/2017      (normal spontaneous vaginal delivery) 2014     Skin cancer      Status post induction of labor for low ALBERT and post-dates 2014     Streptococcus infection in conditions classified elsewhere and of unspecified site, group A     h/o     Unplanned wanted pregnancy     x 2- both on ocp's - 2nd on micronor        Past Surgical History:   Procedure Laterality Date     APPENDECTOMY  3/09    dr frnacois     HC REMOVE TONSILS/ADENOIDS,<11 Y/O  10/01    T & A <11y.o.        Family History   Problem Relation Age of Onset     Lipids Mother      Lipids Father      Diabetes Paternal Grandmother      C.A.D. Maternal Grandfather      Lipids Maternal Grandfather      Diabetes Maternal Grandfather      Diabetes Maternal Grandmother      Lipids Maternal Grandmother      C.A.D. Maternal Grandmother      Cancer - colorectal Maternal Grandmother 73     Skin Cancer Maternal Aunt      Familial Atypical Mole-Malignant Melanoma Syndrome Paternal Cousin        Social History     Socioeconomic History     Marital status: Single     Spouse name: Not on file      Number of children: Not on file     Years of education: Not on file     Highest education level: Not on file   Occupational History     Occupation:  /Manager      Employer: [employer]   Social Needs     Financial resource strain: Not on file     Food insecurity:     Worry: Not on file     Inability: Not on file     Transportation needs:     Medical: Not on file     Non-medical: Not on file   Tobacco Use     Smoking status: Never Smoker     Smokeless tobacco: Never Used   Substance and Sexual Activity     Alcohol use: Not Currently     Comment: 3-4 per week avg     Drug use: No     Sexual activity: Yes     Partners: Male   Lifestyle     Physical activity:     Days per week: Not on file     Minutes per session: Not on file     Stress: Not on file   Relationships     Social connections:     Talks on phone: Not on file     Gets together: Not on file     Attends Amish service: Not on file     Active member of club or organization: Not on file     Attends meetings of clubs or organizations: Not on file     Relationship status: Not on file     Intimate partner violence:     Fear of current or ex partner: Not on file     Emotionally abused: Not on file     Physically abused: Not on file     Forced sexual activity: Not on file   Other Topics Concern      Service Not Asked     Blood Transfusions Not Asked     Caffeine Concern Yes     Comment: occas     Occupational Exposure Not Asked     Hobby Hazards Not Asked     Sleep Concern Not Asked     Stress Concern Not Asked     Weight Concern Not Asked     Special Diet Not Asked     Back Care Not Asked     Exercise Yes     Bike Helmet Not Asked     Seat Belt Yes     Self-Exams Not Asked     Parent/sibling w/ CABG, MI or angioplasty before 65F 55M? No   Social History Narrative     Not on file       Outpatient Encounter Medications as of 9/12/2019   Medication Sig Dispense Refill     fluocinonide (LIDEX) 0.05 % external solution Apply to scalp BID x 2-4  weeks. Do not use on face. Taper with improvement 60 mL 3     ketoconazole (NIZORAL) 2 % shampoo Wet affected area daily, apply shampoo and lather, let sit for 3-5 minutes and then rinse. (Patient not taking: Reported on 8/23/2019) 120 mL 3     No facility-administered encounter medications on file as of 9/12/2019.              Review Of Systems  Skin: As above  Eyes: negative  Ears/Nose/Throat: negative  Respiratory: No shortness of breath, dyspnea on exertion, cough, or hemoptysis  Cardiovascular: negative  Gastrointestinal: negative  Genitourinary: negative  Musculoskeletal: negative  Neurologic: negative  Psychiatric: negative  Hematologic/Lymphatic/Immunologic: negative  Endocrine: negative      O:   NAD, WDWN, Alert & Oriented, Mood & Affect wnl, Vitals stable   Here today alone   /80   Pulse 86   LMP 07/28/2019   SpO2 99%   Breastfeeding? No    General appearance normal   Vitals stable   Alert, oriented and in no acute distress     L ab red plaque 0.8cm  Eyes: Conjunctivae/lids:Normal     ENT: Lips, buccal mucosa, tongue: normal    MSK:Normal    Cardiovascular: peripheral edema none    Pulm: Breathing Normal    Neuro/Psych: Orientation:Normal; Mood/Affect:Normal      MICRO:   L ab:Unremarkable epidermis with parallel bundles of cellular collagen within the superficial dermis.  No concerning areas for malignancy.     A/P:  1. L ab severly atypical nevus  EXCISION OF AN, Margins confirmed with FROZEN SECTIONS and MART1 stain, AND Second intent: After thorough discussion of PGACAC, consent obtained, anesthesia and prep, the margins of the lesion were identified and an elliptical incision was made encompassing the lesion with 4mm margin. The incisions were made through the skin and down to and including the superficial dermis.  The lesion was removed en bloc and submitted for frozen section pathologic review. Clear margins obtained (1.4cm).    REPAIR SECOND INTENT: We discussed the options for wound  management in full with the patient including risks/benefits/possible outcomes. Decision made to allow the wound to heal by second intention. EBL minimal; complications none; wound care routine.  The patient was discharged in good condition and will return in one month or prn for wound evaluation.     BENIGN LESIONS DISCUSSED WITH PATIENT:  I discussed the specifics of tumor, prognosis, and genetics of benign lesions.  I explained that treatment of these lesions would be purely cosmetic and not medically neccessary.  I discussed with patient different removal options including excision, cautery and /or laser.      Nature and genetics of benign skin lesions dicussed with patient.  Signs and Symptoms of skin cancer discussed with patient.  ABCDEs of melanoma reviewed with patient.  Patient encouraged to perform monthly skin exams.  UV precautions reviewed with patient.  Skin care regimen reviewed with patient: Eliminate harsh soaps, i.e. Dial, zest, irsih spring; Mild soaps such as Cetaphil or Dove sensitive skin, avoid hot or cold showers, aggressive use of emollients including vanicream, cetaphil or cerave discussed with patient.    Risks of non-melanoma skin cancer discussed with patient   Return to clinic 6 months      Again, thank you for allowing me to participate in the care of your patient.        Sincerely,        Ernesto Clay MD

## 2019-09-12 NOTE — PATIENT INSTRUCTIONS
Open Wound Care     for ______________        ? No strenuous activity for 48 hours    ? Take Tylenol as needed for discomfort.                                                .         ? Do not drink alcoholic beverages for 48 hours.    ? Keep the pressure bandage in place for 24 hours. If the bandage becomes blood tinged or loose, reinforce it with gauze and tape.        (Refer to the reverse side of this page for management of bleeding).    ? Remove bandage in 24 hours and begin wound care as follows:     1. Clean area with tap water using a Q tip or gauze pad, (shower / bathe normally)  2. Dry wound with Q tip or gauze pad  3. Apply Aquaphor, Vaseline, Polysporin or Bacitracin Ointment with a Q tip    Do NOT use Neosporin Ointment *  4. Cover the wound with a band-aid or nonstick gauze pad and paper tape.  5. Repeat wound care once a day until wound is completely healed.    It is an old wives tale that a wound heals better when it is exposed to air and allowed to dry out. The wound will heal faster with a better cosmetic result if it is kept moist with ointment and covered with a bandage.  Do not let the wound dry out.      Supplies Needed:                Qtips or gauze pads                Polysporin or Bacitracin Ointment                Bandaids or nonstick gauze pads and paper tape    Wound care kits and brown paper tape are available for purchase at   the pharmacy.    BLEEDIN. Use tightly rolled up gauze or cloth to apply direct pressure over the bandage for 20   minutes.  2. Reapply pressure for an additional 20 minutes if necessary  3. Call the office or go to the nearest emergency room if pressure fails to stop the bleeding.  4. Use additional gauze and tape to maintain pressure once the bleeding has stopped.  5. Begin wound care 24 hours after surgery as directed.                  WOUND HEALING    1. One week after surgery a pink / red halo will form around the outside of the wound.   This is new  skin.  2. The center of the wound will appear yellowish white and produce some drainage.  3. The pink halo will slowly migrate in toward the center of the wound until the wound is covered with new shiny pink skin.  4. There will be no more drainage when the wound is completely healed.  5. It will take six months to one year for the redness to fade.  6. The scar may be itchy, tight and sensitive to extreme temperatures for a year after the surgery.  7. Massaging the area several times a day for several minutes after the wound is completely healed will help the scar soften and normalize faster. Begin massage only after healing is complete.      In case of emergency call: Dr Clay: [phone]     [facility]: [phone]    [facility]: [phone]

## 2019-09-12 NOTE — PROGRESS NOTES
Micki Thorpe is a 23 year old year old female patient here today for evaluation and managment of severly atypical nevus on left ab.  Patient reports the following modifying factors none.  Associated symptoms: none.  Patient has no other skin complaints today.  Remainder of the HPI, Meds, PMH, Allergies, FH, and SH was reviewed in chart.      Past Medical History:   Diagnosis Date     Blood type AB+      Cervical high risk HPV (human papillomavirus) test positive 2015    age20, normal pap     Depressive disorder 3/2017      (normal spontaneous vaginal delivery) 2014     Skin cancer      Status post induction of labor for low ALBERT and post-dates 2014     Streptococcus infection in conditions classified elsewhere and of unspecified site, group A     h/o     Unplanned wanted pregnancy     x 2- both on ocp's - 2nd on micronor        Past Surgical History:   Procedure Laterality Date     APPENDECTOMY  3/09    dr francois     HC REMOVE TONSILS/ADENOIDS,<11 Y/O  10/01    T & A <11y.o.        Family History   Problem Relation Age of Onset     Lipids Mother      Lipids Father      Diabetes Paternal Grandmother      C.A.D. Maternal Grandfather      Lipids Maternal Grandfather      Diabetes Maternal Grandfather      Diabetes Maternal Grandmother      Lipids Maternal Grandmother      C.A.D. Maternal Grandmother      Cancer - colorectal Maternal Grandmother 73     Skin Cancer Maternal Aunt      Familial Atypical Mole-Malignant Melanoma Syndrome Paternal Cousin        Social History     Socioeconomic History     Marital status: Single     Spouse name: Not on file     Number of children: Not on file     Years of education: Not on file     Highest education level: Not on file   Occupational History     Occupation:  /Manager      Employer: [employer]   Social Needs     Financial resource strain: Not on file     Food insecurity:     Worry: Not on file     Inability: Not on file     Transportation  needs:     Medical: Not on file     Non-medical: Not on file   Tobacco Use     Smoking status: Never Smoker     Smokeless tobacco: Never Used   Substance and Sexual Activity     Alcohol use: Not Currently     Comment: 3-4 per week avg     Drug use: No     Sexual activity: Yes     Partners: Male   Lifestyle     Physical activity:     Days per week: Not on file     Minutes per session: Not on file     Stress: Not on file   Relationships     Social connections:     Talks on phone: Not on file     Gets together: Not on file     Attends Yazidism service: Not on file     Active member of club or organization: Not on file     Attends meetings of clubs or organizations: Not on file     Relationship status: Not on file     Intimate partner violence:     Fear of current or ex partner: Not on file     Emotionally abused: Not on file     Physically abused: Not on file     Forced sexual activity: Not on file   Other Topics Concern      Service Not Asked     Blood Transfusions Not Asked     Caffeine Concern Yes     Comment: occas     Occupational Exposure Not Asked     Hobby Hazards Not Asked     Sleep Concern Not Asked     Stress Concern Not Asked     Weight Concern Not Asked     Special Diet Not Asked     Back Care Not Asked     Exercise Yes     Bike Helmet Not Asked     Seat Belt Yes     Self-Exams Not Asked     Parent/sibling w/ CABG, MI or angioplasty before 65F 55M? No   Social History Narrative     Not on file       Outpatient Encounter Medications as of 9/12/2019   Medication Sig Dispense Refill     fluocinonide (LIDEX) 0.05 % external solution Apply to scalp BID x 2-4 weeks. Do not use on face. Taper with improvement 60 mL 3     ketoconazole (NIZORAL) 2 % shampoo Wet affected area daily, apply shampoo and lather, let sit for 3-5 minutes and then rinse. (Patient not taking: Reported on 8/23/2019) 120 mL 3     No facility-administered encounter medications on file as of 9/12/2019.              Review Of  Systems  Skin: As above  Eyes: negative  Ears/Nose/Throat: negative  Respiratory: No shortness of breath, dyspnea on exertion, cough, or hemoptysis  Cardiovascular: negative  Gastrointestinal: negative  Genitourinary: negative  Musculoskeletal: negative  Neurologic: negative  Psychiatric: negative  Hematologic/Lymphatic/Immunologic: negative  Endocrine: negative      O:   NAD, WDWN, Alert & Oriented, Mood & Affect wnl, Vitals stable   Here today alone   /80   Pulse 86   LMP 07/28/2019   SpO2 99%   Breastfeeding? No    General appearance normal   Vitals stable   Alert, oriented and in no acute distress     L ab red plaque 0.8cm  Eyes: Conjunctivae/lids:Normal     ENT: Lips, buccal mucosa, tongue: normal    MSK:Normal    Cardiovascular: peripheral edema none    Pulm: Breathing Normal    Neuro/Psych: Orientation:Normal; Mood/Affect:Normal      MICRO:   L ab:Unremarkable epidermis with parallel bundles of cellular collagen within the superficial dermis.  No concerning areas for malignancy.     A/P:  1. L ab severly atypical nevus  EXCISION OF AN, Margins confirmed with FROZEN SECTIONS and MART1 stain, AND Second intent: After thorough discussion of PGACAC, consent obtained, anesthesia and prep, the margins of the lesion were identified and an elliptical incision was made encompassing the lesion with 4mm margin. The incisions were made through the skin and down to and including the superficial dermis.  The lesion was removed en bloc and submitted for frozen section pathologic review. Clear margins obtained (1.4cm).    REPAIR SECOND INTENT: We discussed the options for wound management in full with the patient including risks/benefits/possible outcomes. Decision made to allow the wound to heal by second intention. EBL minimal; complications none; wound care routine.  The patient was discharged in good condition and will return in one month or prn for wound evaluation.     BENIGN LESIONS DISCUSSED WITH PATIENT:   I discussed the specifics of tumor, prognosis, and genetics of benign lesions.  I explained that treatment of these lesions would be purely cosmetic and not medically neccessary.  I discussed with patient different removal options including excision, cautery and /or laser.      Nature and genetics of benign skin lesions dicussed with patient.  Signs and Symptoms of skin cancer discussed with patient.  ABCDEs of melanoma reviewed with patient.  Patient encouraged to perform monthly skin exams.  UV precautions reviewed with patient.  Skin care regimen reviewed with patient: Eliminate harsh soaps, i.e. Dial, zest, irsih spring; Mild soaps such as Cetaphil or Dove sensitive skin, avoid hot or cold showers, aggressive use of emollients including vanicream, cetaphil or cerave discussed with patient.    Risks of non-melanoma skin cancer discussed with patient   Return to clinic 6 months

## 2019-09-26 ENCOUNTER — ANCILLARY PROCEDURE (OUTPATIENT)
Dept: ULTRASOUND IMAGING | Facility: CLINIC | Age: 24
End: 2019-09-26
Attending: NURSE PRACTITIONER
Payer: COMMERCIAL

## 2019-09-26 DIAGNOSIS — Z32.01 PREGNANCY TEST POSITIVE: ICD-10-CM

## 2019-09-26 PROCEDURE — 76801 OB US < 14 WKS SINGLE FETUS: CPT | Performed by: OBSTETRICS & GYNECOLOGY

## 2019-09-27 ENCOUNTER — HEALTH MAINTENANCE LETTER (OUTPATIENT)
Age: 24
End: 2019-09-27

## 2019-09-27 NOTE — RESULT ENCOUNTER NOTE
Dear Micki,     Your first ultrasound was normal and reassuring showing a 8 week pregnancy.      See below for OB/GYN's interpretation:      Early obstetric transabdominal ultrasound. Living intrauterine pregnancy is seen.      Corresponding sonographic and menstrual EGA and EDC. Based on this, best EDC for this pregnancy is 5/4/2020.    Normal amniotic fluid seen.        Please send a [employer] message or call [phone]  if you have any questions.        Zaria Watson, APRN, CNP  [facility]

## 2019-10-03 ENCOUNTER — TELEPHONE (OUTPATIENT)
Dept: FAMILY MEDICINE | Facility: CLINIC | Age: 24
End: 2019-10-03

## 2019-10-03 ENCOUNTER — PRENATAL OFFICE VISIT (OUTPATIENT)
Dept: FAMILY MEDICINE | Facility: CLINIC | Age: 24
End: 2019-10-03
Payer: COMMERCIAL

## 2019-10-03 ENCOUNTER — RESULT FOLLOW UP (OUTPATIENT)
Dept: FAMILY MEDICINE | Facility: CLINIC | Age: 24
End: 2019-10-03

## 2019-10-03 VITALS
DIASTOLIC BLOOD PRESSURE: 80 MMHG | HEIGHT: 66 IN | HEART RATE: 94 BPM | OXYGEN SATURATION: 99 % | SYSTOLIC BLOOD PRESSURE: 120 MMHG | WEIGHT: 181 LBS | TEMPERATURE: 99.4 F | BODY MASS INDEX: 29.09 KG/M2

## 2019-10-03 DIAGNOSIS — D23.5 DYSPLASTIC NEVUS OF TRUNK: ICD-10-CM

## 2019-10-03 DIAGNOSIS — Z12.4 SCREENING FOR MALIGNANT NEOPLASM OF CERVIX: ICD-10-CM

## 2019-10-03 DIAGNOSIS — Z34.81 ENCOUNTER FOR SUPERVISION OF OTHER NORMAL PREGNANCY IN FIRST TRIMESTER: ICD-10-CM

## 2019-10-03 DIAGNOSIS — Z34.81 ENCOUNTER FOR SUPERVISION OF OTHER NORMAL PREGNANCY IN FIRST TRIMESTER: Primary | ICD-10-CM

## 2019-10-03 LAB
ABO + RH BLD: NORMAL
ABO + RH BLD: NORMAL
ALBUMIN UR-MCNC: 30 MG/DL
APPEARANCE UR: CLEAR
BILIRUB UR QL STRIP: NEGATIVE
BLD GP AB SCN SERPL QL: NORMAL
BLOOD BANK CMNT PATIENT-IMP: NORMAL
COLOR UR AUTO: YELLOW
ERYTHROCYTE [DISTWIDTH] IN BLOOD BY AUTOMATED COUNT: 12.3 % (ref 10–15)
GLUCOSE UR STRIP-MCNC: NEGATIVE MG/DL
HCT VFR BLD AUTO: 37.5 % (ref 35–47)
HGB BLD-MCNC: 13.4 G/DL (ref 11.7–15.7)
HGB UR QL STRIP: ABNORMAL
KETONES UR STRIP-MCNC: ABNORMAL MG/DL
LEUKOCYTE ESTERASE UR QL STRIP: NEGATIVE
MCH RBC QN AUTO: 30.9 PG (ref 26.5–33)
MCHC RBC AUTO-ENTMCNC: 35.7 G/DL (ref 31.5–36.5)
MCV RBC AUTO: 86 FL (ref 78–100)
MUCOUS THREADS #/AREA URNS LPF: PRESENT /LPF
NITRATE UR QL: NEGATIVE
NON-SQ EPI CELLS #/AREA URNS LPF: ABNORMAL /LPF
PH UR STRIP: 5.5 PH (ref 5–7)
PLATELET # BLD AUTO: 221 10E9/L (ref 150–450)
RBC # BLD AUTO: 4.34 10E12/L (ref 3.8–5.2)
RBC #/AREA URNS AUTO: ABNORMAL /HPF
SOURCE: ABNORMAL
SP GR UR STRIP: >1.03 (ref 1–1.03)
SPECIMEN EXP DATE BLD: NORMAL
SPECIMEN SOURCE: NORMAL
UROBILINOGEN UR STRIP-ACNC: 0.2 EU/DL (ref 0.2–1)
WBC # BLD AUTO: 11.2 10E9/L (ref 4–11)
WBC #/AREA URNS AUTO: ABNORMAL /HPF
WET PREP SPEC: NORMAL

## 2019-10-03 PROCEDURE — 87086 URINE CULTURE/COLONY COUNT: CPT | Performed by: FAMILY MEDICINE

## 2019-10-03 PROCEDURE — 90686 IIV4 VACC NO PRSV 0.5 ML IM: CPT | Performed by: FAMILY MEDICINE

## 2019-10-03 PROCEDURE — 86850 RBC ANTIBODY SCREEN: CPT | Performed by: FAMILY MEDICINE

## 2019-10-03 PROCEDURE — 86780 TREPONEMA PALLIDUM: CPT | Performed by: FAMILY MEDICINE

## 2019-10-03 PROCEDURE — 87389 HIV-1 AG W/HIV-1&-2 AB AG IA: CPT | Performed by: FAMILY MEDICINE

## 2019-10-03 PROCEDURE — 86900 BLOOD TYPING SEROLOGIC ABO: CPT | Performed by: FAMILY MEDICINE

## 2019-10-03 PROCEDURE — 86901 BLOOD TYPING SEROLOGIC RH(D): CPT | Performed by: FAMILY MEDICINE

## 2019-10-03 PROCEDURE — 81001 URINALYSIS AUTO W/SCOPE: CPT | Performed by: FAMILY MEDICINE

## 2019-10-03 PROCEDURE — 87210 SMEAR WET MOUNT SALINE/INK: CPT | Performed by: FAMILY MEDICINE

## 2019-10-03 PROCEDURE — 90471 IMMUNIZATION ADMIN: CPT | Performed by: FAMILY MEDICINE

## 2019-10-03 PROCEDURE — G0145 SCR C/V CYTO,THINLAYER,RESCR: HCPCS | Performed by: FAMILY MEDICINE

## 2019-10-03 PROCEDURE — 86762 RUBELLA ANTIBODY: CPT | Performed by: FAMILY MEDICINE

## 2019-10-03 PROCEDURE — 36415 COLL VENOUS BLD VENIPUNCTURE: CPT | Performed by: FAMILY MEDICINE

## 2019-10-03 PROCEDURE — 99207 ZZC FIRST OB VISIT: CPT | Performed by: FAMILY MEDICINE

## 2019-10-03 PROCEDURE — 87591 N.GONORRHOEAE DNA AMP PROB: CPT | Performed by: FAMILY MEDICINE

## 2019-10-03 PROCEDURE — 85027 COMPLETE CBC AUTOMATED: CPT | Performed by: FAMILY MEDICINE

## 2019-10-03 PROCEDURE — 87624 HPV HI-RISK TYP POOLED RSLT: CPT | Performed by: FAMILY MEDICINE

## 2019-10-03 PROCEDURE — 87340 HEPATITIS B SURFACE AG IA: CPT | Performed by: FAMILY MEDICINE

## 2019-10-03 PROCEDURE — 87491 CHLMYD TRACH DNA AMP PROBE: CPT | Performed by: FAMILY MEDICINE

## 2019-10-03 RX ORDER — PNV NO.95/FERROUS FUM/FOLIC AC 28MG-0.8MG
1 TABLET ORAL DAILY
COMMUNITY
Start: 2019-10-03 | End: 2021-07-08

## 2019-10-03 ASSESSMENT — MIFFLIN-ST. JEOR: SCORE: 1582.76

## 2019-10-03 NOTE — TELEPHONE ENCOUNTER
Reason for Call:  Same Day Appointment, Requested Provider:  Ashwini Garcia MD    PCP: Ashwini Garcia    Reason for visit: The patient stopped by the  to make the rest of her OB appointments with [facility]. Please help her set all of these up.    Can we leave a detailed message on this number? YES    Phone number patient can be reached at: Cell number on file:    Telephone Information:   Mobile [phone]     Best Time: Anytime    Call taken on 10/3/2019 at 11:07 AM by Pia Chinchilla

## 2019-10-03 NOTE — TELEPHONE ENCOUNTER
Called and left detailed message for patient to return call to schedule visits.     Ashwini Denise MA

## 2019-10-03 NOTE — PATIENT INSTRUCTIONS
Healthy Eating and Pregnancy  Ten Tips for Good Nutrition    1. The food you eat helps your baby grow.  Good nutrition can help prevent birth defects and help your baby have a healthy birthweight.  2. Each day, eat a variety of healthy foods.  Vegetables (3 times a day)  Fruits (2 times a day  Grains such as bread, cereal, pasta or tortillas (6 times a day)  Milk, cheese or yogurt 3-4 times a day  Meat or other protein foods such as beans, peanut butter or tofu (3 times a day)  A serving may be   cup of vegetables, one piece of fruit, one slice of bread or an 8 ounce glass of milk.  3. Get plenty of calcium, iron and zinc  For calcium, eat 3-4 servings of milk, cheese, yogurt, firm tofu or dark green leafy vegetables each day.  For iron and zinc, eat red meats, beans and whole grains.  4. Eat foods high in folic acid  Getting enough folic acid can help prevent certain types of birth defects. It s important to get enough folic acid before you become pregnant and in early pregnancy  5. Ask your health care provider if you should take a prenatal vitamin  It is one way to make sure you are getting enough folic acid, calcium and other important vitamins and minerals  6. Drink at least 6-8 glasses of water each day.  You may need to drink even more in hot weather.  7. Avoid alcohol and limit caffeine  Beer, wine, and other kinds of alcohol can hurt your baby. It s best not to drink it at all.  Too much caffeine  (in coffee, soda, tea and chocolate) may harm your baby.  Limit yourself to 1 food or drink that contains caffeine each day.  8. Make sure your food is fully cooked.  Food poisoning from raw or undercooked meat, chicken, turkey, fish or eggs can be very dangerous to your baby.  9. Eat 4-6 small meals a day  This can help with morning sickness and indigestion  10. Most women need to eat about 300 extra calories each day  This is like having an extra glass of low-fat milk and a turkey sandwich  Most women need to  gain 25 to 35 pounds during pregnancy  Ask your health care provider how much weight you should gain     Exercise and Pregnancy  Ten Tips for Staying Fit  Exercising while pregnant can help you feel better and give you more strength for labor. You may recover more quickly after delivery. Your baby may even be healthier.  1. Talk with your health care provider before starting.  Most women can exercise safely during pregnancy.  But because each pregnancy is different talk to your health care provider to make sure exercising is right for you and your baby.  2. Exercise at least 3 times a week  You will be less likely to overdo or hurt yourself than if you only exercise once in a while  3. Try walking or swimming  Or join a low-impact aerobics class especially for pregnant women  4. Warm up slowly  Because most women s joints are looser during pregnancy, you will need to be careful not to stretch too much.  You may also want to avoid sit-ups and crunches  5. Avoid activities that may be risky for your baby  Do not do activities where you may fall or jar your body  For your baby s safety. Do not exercise on your back after the first three months  6. Don t let your heart rate go over 140 beats per minute  Ask you health care provider how to check your heart rate when exercising  7. Wear cool clothing and don t exercise in hot, humid weather.  Getting too hot can hurt your baby  8. If you lose your breath or get tired, slow down  You may not be able to do as much as you used to  9. Eat well and drinks lots of water  Drink at least 1 glass of water before exercising and 1 glass after exercising  If you are having a hard time gaining weight, talk to your health care provider  10. Stop and call your health care provider if you have any problems  Including:  Feeling very tired, dizzy or faint  Pain in your abdomen  Vaginal bleeding  Chest pains or trouble catching your breath  Contractions that continue for more than 30  minutes after exercising                                                 SAFE MEDICATIONS IN PREGNANCY    MEDICATION                                                                   INDICATION                                                Tylenol                                                                     fever, aches and pain  Tylenol Sinus                                                           fever, aches and pain, nasal congestion  Sudafed                                                                    nasal congestion  Actifed                                                                      nasal congestion  Robitussin                                                                cough  Robitussin-DM                                                        cough  Throat Lozenges                                                     dry scratchy or sore throat    Mylanta                                                                    heartburn  Maalox                                                                     heartburn  Tums                                                                         heartburn  Rolaids                                                                      heartburn    Metamucil or wafer                                               stool softener  Milk of  Magnesia                                                   stool softener, laxative-only occasional use  Dialose Plus                                                             stool softener, laxative-only occasional use  Colace                                                                       stool softener, laxative-only occasional use    Preparation H                                                          hemorrhoids  Anusol HC or plain                                                  hemorrhoids    Kopectate                                                                 diarrhea    Monistat                                                                    yeast infection    RID shampoo                                                            lice    Novacaine                                                                 dental work or minor surgery    Allergy Shots                                                             OK for allergies    Mantoux                                                                    TB test    ANTIBIOTICS:   Penicillin (and all Penicillin family), Ampicillin, Amoxicillin,                             Augmentin, Pen V-K, Keflex, Erythromycin, Macrodantin and                             Macrobid                                                                 Feeling Queasy?  10 ways to cope with morning sickness  Morning sickness,  the bane of pregnant women everywhere, can knock even the most active g-getter off her feet.  It can be particularly troublesome when you have to work every morning and your still trying to keep your pregnancy quiet at the office.  No doubt, you are already avoiding any foods and smells that make your stomach churn.  Here are 10 more ways to keep nausea at bay (if you suffer from extreme nausea, talk to your healthcare provider about other treatments, including medicine.  1. Keep simple snacks, such as crackers, by your bedside.  When you first wake up, nibble a few crackers and then rest for 20 or 30 minutes before getting out of bed.  2.  Eat small, frequent meals or snacks throughout the day. Aim for bland foods that are high in protein or carbohydrates, as both can help fight nausea.  Good choices include crackers, animal crackers and yogurt.  3. Try taking 50mg of vitamin B6 twice a day with your healthcare providers consent. B6 helps the body metabolize certain amino acids (proteins), which may somehow reduce nausea, although no one knows exactly why this works. Or try eating foods like yogurt which are high in B vitamins  4. Avoid  rich, spicy, acidic and fried foods and eat less fat in general..  5. Though it is important to keep yourself well hydrated, try drinking fluids only between meals and limit them during meals.  6. Sniff katia.  The smell of a cut lemon may help your nausea. Put slices in your iced tea or sparkling water.  7. Drink ginger ale or ginger tea. Ginger is known to settle your stomach and help queasiness . But make sure the beverage you choose is made with real ginger-many sodas are not. (ask your doctor before taking ginger supplements since some experts think they may increase the risk of miscarriage)  8. Give yourself time to relax. Talking things over with another mother-to-be can be a nice way to relive stress and emotions may also play a role in morning sickness.  9. Try acupressure bands.  You can find these soft cotton wristbands at [facility].  This simple device, design to hoffman off seasickness, has also helped many pregnant woman through morning sickness.  Strap it on so that the plastic button pushes against an acupressure point in the underside of your wrist, and that you may begin feeling some relief.  10. Take prenatal vitamins at night instead of morning. Decrease iron to once a day instead of twice a day             Thank you for choosing [facility]  for your Health Care. It was a pleasure seeing you at your visit today. Please contact us with any questions or concerns you may have.                   Ashwini Garcia MD                                  To reach your [facility] care team after hours call:   [phone]    Our clinic hours are:     Monday- 7:30 am - 7:00 pm                             Tuesday through Friday- 7:30 am - 5:00 pm                                        Saturday- 8:00 am - 12:00 pm                  Phone:  [phone]    Our pharmacy hours are:     Monday  8:00 am to 7:00 pm      Tuesday through Friday 8:00am to 6:00pm                         Saturday - 9:00 am to 1:00 pm      Sunday : Closed.              Phone:  [phone]      There is also information available at our web site:  [email or web address]    If your provider ordered any lab tests and you do not receive the results within 10 business days, please call the clinic.    If you need a medication refill please contact your pharmacy.  Please allow 2 business days for your refill to be completed.    Our clinic offers telephone visits and e visits.  Please ask one of your team members to explain more.      Use [employer] (secure email communication and access to your chart) to send your primary care provider a message or make an appointment. Ask someone on your Team how to sign up for [employer].

## 2019-10-04 LAB
BACTERIA SPEC CULT: NO GROWTH
C TRACH DNA SPEC QL NAA+PROBE: NEGATIVE
HBV SURFACE AG SERPL QL IA: NONREACTIVE
HIV 1+2 AB+HIV1 P24 AG SERPL QL IA: NONREACTIVE
N GONORRHOEA DNA SPEC QL NAA+PROBE: NEGATIVE
RUBV IGG SERPL IA-ACNC: 12 IU/ML
SPECIMEN SOURCE: NORMAL
T PALLIDUM AB SER QL: NONREACTIVE

## 2019-10-09 LAB
COPATH REPORT: NORMAL
PAP: NORMAL

## 2019-10-11 LAB
FINAL DIAGNOSIS: NORMAL
HPV HR 12 DNA CVX QL NAA+PROBE: NEGATIVE
HPV16 DNA SPEC QL NAA+PROBE: NEGATIVE
HPV18 DNA SPEC QL NAA+PROBE: NEGATIVE
SPECIMEN DESCRIPTION: NORMAL
SPECIMEN SOURCE CVX/VAG CYTO: NORMAL

## 2019-10-21 PROBLEM — F32.1 MAJOR DEPRESSIVE DISORDER, SINGLE EPISODE, MODERATE (H): Status: RESOLVED | Noted: 2017-03-08 | Resolved: 2019-10-21

## 2019-10-21 NOTE — PROGRESS NOTES
9/16/15 NIL/+ HR HPV. @ 18 yo. Per ASCCP guidelines, HPV screening should not be done before age 29 yo.  5/18/16 NIL. Plan: repeat diagnostic pap in 1 year, per provider.  06/04/18 Patient is lost to pap tracking follow-up.   10/03/19: NIL Pap, Neg HR HPV result. Plan cotest 6 weeks pp per provider. Estimated Date of Delivery: May 3, 2020

## 2019-10-30 ENCOUNTER — PRENATAL OFFICE VISIT (OUTPATIENT)
Dept: FAMILY MEDICINE | Facility: CLINIC | Age: 24
End: 2019-10-30
Payer: COMMERCIAL

## 2019-10-30 VITALS
WEIGHT: 178 LBS | DIASTOLIC BLOOD PRESSURE: 74 MMHG | BODY MASS INDEX: 28.61 KG/M2 | TEMPERATURE: 98.1 F | SYSTOLIC BLOOD PRESSURE: 120 MMHG | HEART RATE: 98 BPM | OXYGEN SATURATION: 98 % | HEIGHT: 66 IN

## 2019-10-30 DIAGNOSIS — Z34.81 ENCOUNTER FOR SUPERVISION OF OTHER NORMAL PREGNANCY IN FIRST TRIMESTER: Primary | ICD-10-CM

## 2019-10-30 LAB
ALBUMIN UR QL STRIP: 30 MG/DL
GLUCOSE UR STRIP-MCNC: NEGATIVE MG/DL

## 2019-10-30 PROCEDURE — 00000219 ZZHCL STATISTIC OBSA - URINALYSIS: Performed by: FAMILY MEDICINE

## 2019-10-30 PROCEDURE — 99207 ZZC PRENATAL VISIT: CPT | Performed by: FAMILY MEDICINE

## 2019-10-30 ASSESSMENT — MIFFLIN-ST. JEOR: SCORE: 1569.15

## 2019-10-30 NOTE — PROGRESS NOTES
"  SUBJECTIVE:                                                    Micki Thorpe is here today for a Prenatal Visit at 13w3d gestation.    Concerns today:  none  Bleeding:  No  Cramping/Contractions:No  Leaking of fluid: No  Edema:  No  Baby moving:  Yes  O: See OB Vitals      Problem list and histories reviewed & adjusted, as indicated.  Additional history: as documented    Reviewed and updated as needed this visit by clinical staff       Reviewed and updated as needed this visit by Provider         ROS:   ROS: 12 point ROS neg other than the symptoms noted above    OBJECTIVE:                                                    /74   Pulse 98   Temp 98.1  F (36.7  C) (Tympanic)   Ht 1.676 m (5' 6\")   Wt 80.7 kg (178 lb)   LMP 07/28/2019 (Exact Date)   SpO2 98%   Breastfeeding? No   BMI 28.73 kg/m    Body mass index is 28.73 kg/m .     BP Readings from Last 3 Encounters:   10/30/19 120/74   10/03/19 120/80   09/12/19 128/80       Diagnostic test results:   Diagnostic Test Results:  Labs reviewed in Epic  Results for orders placed or performed in visit on 10/30/19 (from the past 24 hour(s))   Glucose and albumin, OB urine   Result Value Ref Range    Protein Albumin Urine 30 mg/dL    Glucose Urine Negative mg/dL        ASSESSMENT/PLAN:                                                        ICD-10-CM    1. Encounter for supervision of other normal pregnancy in first trimester Z34.81 Glucose and albumin, OB urine       Will do Maternal quad test at next visit in  4 weeks. Return in about 4 weeks (around 11/27/2019) for prenatal visit.   See Patient Instructions.          Ashwini Garcia MD    [facility] [place]        "

## 2019-11-06 ENCOUNTER — NURSE TRIAGE (OUTPATIENT)
Dept: FAMILY MEDICINE | Facility: CLINIC | Age: 24
End: 2019-11-06

## 2019-11-06 NOTE — TELEPHONE ENCOUNTER
Message handled by Nurse Triage with [facility] - provider name: [place]. Per discussion Pt is to take tylenol with caffeine and if normotensive to schedule appt. Pt contacted and reported at home BP was 128/87 and 126/85. Pt given appt.   Next 5 appointments (look out 90 days)    Nov 07, 2019 11:40 AM CST  SHORT with Ashwini Garcia MD  [facility] ([facility] [address] [place] [address] [phone]     Neil Woodall RN   [place] Triage

## 2019-11-06 NOTE — TELEPHONE ENCOUNTER
"Pt reports a mild head ache with intermittent room tilting. Pt reports the headache is a constant dull ache and the room tilts every so often, does not correlate with change in position. Pt denies fainting or passing out. DENIES: CP, SOB, Difficulty Breathing, Dizziness/Lightheadedness, Numbness/Tingling, HA, Vision/Hearing Changes, N/V, Palpitations, change in edema, fever, or other illness.  Pt advised to check BP with home monitor cuff.     Pt is wondering if safety is concern about driving long distance to work with dizziness.      Will discuss with PCP and recall Pt with recommendations.        Additional Information    Negative: SEVERE headache, states 'worst headache' of life    Negative: SEVERE headache, sudden onset (i.e., reaching maximum intensity within 30 seconds)    Negative: Severe pain in one eye    Negative: Loss of vision or double vision    Negative: Patient sounds very sick or weak to the triager    Negative: Fever > 103 F (39.4 C)    Negative: Fever > 100.0 F (37.8 C) and has diabetes mellitus or a weak immune system (e.g., HIV positive, cancer chemotherapy, organ transplant, splenectomy, chronic steroids)    Negative: SEVERE headache (e.g., excruciating) and has had severe headaches before    Negative: SEVERE headache and not relieved by pain meds    Negative: SEVERE headache and vomiting    Negative: SEVERE headache and fever    Negative: New headache and weak immune system (e.g., HIV positive, cancer chemotherapy, chronic steroid treatment)    Negative: Fever present > 3 days (72 hours)    Negative: Patient wants to be seen    Negative: Unexplained headache that is present > 24 hours    Negative: New headache and age > 50    Negative: Headache started during sex    Negative: Headache is a chronic symptom (recurrent or ongoing AND lasting > 4 weeks)    Mild-moderate headache    Answer Assessment - Initial Assessment Questions  1. LOCATION: \"Where does it hurt?\"       Side of head    2. ONSET: " "\"When did the headache start?\" (Minutes, hours or days)      Couple days    3. PATTERN: \"Does the pain come and go, or has it been constant since it started?\"      Constant dull ache    4. SEVERITY: \"How bad is the pain?\" and \"What does it keep you from doing?\"  (e.g., Scale 1-10; mild, moderate, or severe)    - MILD (1-3): doesn't interfere with normal activities     - MODERATE (4-7): interferes with normal activities or awakens from sleep     - SEVERE (8-10): excruciating pain, unable to do any normal activities         1-2 of severity    5. RECURRENT SYMPTOM: \"Have you ever had headaches before?\" If so, ask: \"When was the last time?\" and \"What happened that time?\"       Yes, before pregnancy.    6. CAUSE: \"What do you think is causing the headache?\"      Unknown    7. MIGRAINE: \"Have you been diagnosed with migraine headaches?\" If so, ask: \"Is this headache similar?\"       No    8. HEAD INJURY: \"Has there been any recent injury to the head?\"       No    9. OTHER SYMPTOMS: \"Do you have any other symptoms?\" (fever, stiff neck, eye pain, sore throat, cold symptoms)      No    10. PREGNANCY: \"Is there any chance you are pregnant?\" \"When was your last menstrual period?\"        Yes, 2nd trimester.    Protocols used: HEADACHE-A-[place]    Neil Woodall RN   [place] [facility]    "

## 2019-11-07 ENCOUNTER — OFFICE VISIT (OUTPATIENT)
Dept: FAMILY MEDICINE | Facility: CLINIC | Age: 24
End: 2019-11-07
Payer: COMMERCIAL

## 2019-11-07 VITALS
DIASTOLIC BLOOD PRESSURE: 76 MMHG | SYSTOLIC BLOOD PRESSURE: 124 MMHG | TEMPERATURE: 98.3 F | HEART RATE: 94 BPM | OXYGEN SATURATION: 100 % | BODY MASS INDEX: 28.28 KG/M2 | HEIGHT: 66 IN | WEIGHT: 176 LBS

## 2019-11-07 DIAGNOSIS — Z33.1 PREGNANCY, INCIDENTAL: ICD-10-CM

## 2019-11-07 DIAGNOSIS — G44.209 TENSION HEADACHE: ICD-10-CM

## 2019-11-07 DIAGNOSIS — S16.1XXA STRAIN OF NECK MUSCLE, INITIAL ENCOUNTER: ICD-10-CM

## 2019-11-07 DIAGNOSIS — H81.11 BENIGN PAROXYSMAL POSITIONAL VERTIGO OF RIGHT EAR: Primary | ICD-10-CM

## 2019-11-07 PROCEDURE — 99214 OFFICE O/P EST MOD 30 MIN: CPT | Performed by: FAMILY MEDICINE

## 2019-11-07 ASSESSMENT — MIFFLIN-ST. JEOR: SCORE: 1560.08

## 2019-11-07 NOTE — PROGRESS NOTES
"Subjective     Micki Thorpe is a 24 year old female who presents to clinic today for the following health issues:  Pt is 13 weeks 6 days pregnant today.    Estimated Date of Delivery: May 3, 2020     HPI   Pt states Sx are the same as yesterday - denies fevers, SOB, chest pain, vomiting or diarrhea   From   \"TRIAGE NOTE 11/6/19:  Pt reports a mild head ache with intermittent room tilting. Pt reports the headache is a constant dull ache and the room tilts every so often, does not correlate with change in position. Pt denies fainting or passing out. DENIES: CP, SOB, Difficulty Breathing, Dizziness/Lightheadedness, Numbness/Tingling, HA, Vision/Hearing Changes, N/V, Palpitations, change in edema, fever, or other illness.  Pt advised to check BP with home monitor cuff.      Pt is wondering if safety is concern about driving long distance to work with dizziness.    1. LOCATION: \"Where does it hurt?\"       Side of head    2. ONSET: \"When did the headache start?\" (Minutes, hours or days)      Couple days    3. PATTERN: \"Does the pain come and go, or has it been constant since it started?\"      Constant dull ache    4. SEVERITY: \"How bad is the pain?\" and \"What does it keep you from doing?\"  (e.g., Scale 1-10; mild, moderate, or severe)    - MILD (1-3): doesn't interfere with normal activities     - MODERATE (4-7): interferes with normal activities or awakens from sleep     - SEVERE (8-10): excruciating pain, unable to do any normal activities         1-2 of severity    5. RECURRENT SYMPTOM: \"Have you ever had headaches before?\" If so, ask: \"When was the last time?\" and \"What happened that time?\"       Yes, before pregnancy.    6. CAUSE: \"What do you think is causing the headache?\"      Unknown    7. MIGRAINE: \"Have you been diagnosed with migraine headaches?\" If so, ask: \"Is this headache similar?\"       No    8. HEAD INJURY: \"Has there been any recent injury to the head?\"       No    9. OTHER SYMPTOMS: \"Do you have any " "other symptoms?\" (fever, stiff neck, eye pain, sore throat, cold symptoms)      No    10. PREGNANCY: \"Is there any chance you are pregnant?\" \"When was your last menstrual period?\"        Yes, 2nd trimester.    Protocols used: HEADACHE-A-[place]     Neil Woodall RN   [place] Triage\"     Today , November 7, 2019 : Feels like the room is still tilting a bit.  Feels like the room/her vision is \"ticking\" a bit as well- like a sprinkler.  No hx of fall , trauma or assault.  No nausea/vomiting or diarrhea.     No changes in speech, swallowing, hearing, coordination  or vision.    Has been having some tingling /numbness in her bilateral hands   No numbness, tingling, or weakness in upper or lower extremities. No bowel or bladder control problems.   Pain in back of neck is better today.   No hx of migraines.   No hearing loss, pressure in ears, or  Tinnitus.  Hasn't noticed that position change of body or head makes any difference.  Above symptoms seem to be random.   Feels better if she closes her eyes when the room tilts or feels off balance.     Patient Active Problem List   Diagnosis     Blood type AB+ - no need for rhogam     CARDIOVASCULAR SCREENING; LDL GOAL LESS THAN 160     Unplanned wanted pregnancy - x 2- both on ocp's - 2nd on micronor      Cervical high risk HPV (human papillomavirus) test positive     Anxiety     Irritability and anger     Dysplastic nevus of trunk - Compound dysplastic nevus with moderate to severe atypia s/p MOHS procedure - Dr. Clay        Current Outpatient Medications   Medication Sig Dispense Refill     fluocinonide (LIDEX) 0.05 % external solution Apply to scalp BID x 2-4 weeks. Do not use on face. Taper with improvement 60 mL 3     Prenatal Vit-Fe Fumarate-FA (PRENATAL VITAMIN) 27-0.8 MG TABS Take 1 tablet by mouth daily          No Known Allergies         Review of Systems   ROS COMP: Constitutional, HEENT, cardiovascular, pulmonary, GI, , musculoskeletal, neuro, skin, endocrine " "and psych systems are negative, except as otherwise noted.      Objective    /76   Pulse 94   Temp 98.3  F (36.8  C) (Oral)   Ht 1.676 m (5' 6\")   Wt 79.8 kg (176 lb)   LMP 07/28/2019 (Exact Date)   SpO2 100%   BMI 28.41 kg/m    Body mass index is 28.41 kg/m .  Physical Exam   GENERAL: healthy, alert and no distress  EYES: Eyes grossly normal to inspection, PERRL and conjunctivae and sclerae normal  HENT: ear canals and TM's normal, nose and mouth without ulcers or lesions  NECK: no adenopathy, no asymmetry, masses, or scars and thyroid normal to palpation, tight and tender paraspinous neck, sternocleidomastoid muscle and upper trapezius muscles are noted.    RESP: lungs clear to auscultation - no rales, rhonchi or wheezes  BREAST: normal without masses, tenderness or nipple discharge and no palpable axillary masses or adenopathy  CV: regular rate and rhythm, normal S1 S2, no S3 or S4, no murmur, click or rub, no peripheral edema and peripheral pulses strong  ABDOMEN: soft, nontender, no hepatosplenomegaly, no masses and bowel sounds normal  MS: no gross musculoskeletal defects noted, no edema  SKIN: no suspicious lesions or rashes  NEURO: PERRL, EOMI. Fundoscopic exam reveals sharp discs bilaterally, no evidence of papilledema. Visual fields are fully intact by confrontation.   Cranial nerves 2-12 are fully intact.     Muscle strength is 5/5 at the biceps, triceps, brachioradialis,  strength and finger spread as well as hip flexors and extendors, quadriceps, hamstrings, foot dorsi- and plantar flexion as well as extensor hallucis longus bilaterally.   Reflexes are brisk and symmetric at the biceps, triceps, brachioradialis, patellar, and Achilles tendons bilaterally.  Toes are down going bilaterally.   Gait is normal. Rhomberg is negative. No pronator drift.  Fine finger movements and finger-nose-finger fully intact. Visual fields fully intact by confrontation.    PSYCH: mentation appears normal, " "affect normal/bright  Barany maneuvers = duplication of pt's dizziness with rapid head turning  to the right, but not the left. . No nystagmus is noted. Positive  dizziness with sitting up from the right, but not the left.       Diagnostic Test Results:  Labs reviewed in Epic        Assessment & Plan       ICD-10-CM    1. Benign paroxysmal positional vertigo of right ear H81.11    2. Strain of neck muscle, initial encounter S16.1XXA    3. Tension headache G44.209         BMI:   Estimated body mass index is 28.41 kg/m  as calculated from the following:    Height as of this encounter: 1.676 m (5' 6\").    Weight as of this encounter: 79.8 kg (176 lb).   Weight management plan: Discussed healthy diet and exercise guidelines    gave stretching and strengthening handouts for the neck and upper back - to be done slowly and gently. Pt declines physical therapy referral at this time. Consider that if symptoms not resolving in the next several days.     Return in about 2 weeks (around 11/21/2019) for if symptoms not resolving .    Ashwini Garcia MD  [facility] [place]      "

## 2019-11-27 ENCOUNTER — PRENATAL OFFICE VISIT (OUTPATIENT)
Dept: FAMILY MEDICINE | Facility: CLINIC | Age: 24
End: 2019-11-27
Payer: COMMERCIAL

## 2019-11-27 VITALS
OXYGEN SATURATION: 99 % | HEART RATE: 92 BPM | WEIGHT: 176 LBS | TEMPERATURE: 98 F | BODY MASS INDEX: 28.28 KG/M2 | HEIGHT: 66 IN | DIASTOLIC BLOOD PRESSURE: 66 MMHG | SYSTOLIC BLOOD PRESSURE: 122 MMHG

## 2019-11-27 DIAGNOSIS — Z34.81 ENCOUNTER FOR SUPERVISION OF OTHER NORMAL PREGNANCY IN FIRST TRIMESTER: Primary | ICD-10-CM

## 2019-11-27 LAB
ALBUMIN UR QL STRIP: NEGATIVE MG/DL
GLUCOSE UR STRIP-MCNC: NEGATIVE MG/DL

## 2019-11-27 PROCEDURE — 81511 FTL CGEN ABNOR FOUR ANAL: CPT | Mod: 90 | Performed by: FAMILY MEDICINE

## 2019-11-27 PROCEDURE — 99000 SPECIMEN HANDLING OFFICE-LAB: CPT | Performed by: FAMILY MEDICINE

## 2019-11-27 PROCEDURE — 00000219 ZZHCL STATISTIC OBSA - URINALYSIS: Performed by: FAMILY MEDICINE

## 2019-11-27 PROCEDURE — 36415 COLL VENOUS BLD VENIPUNCTURE: CPT | Performed by: FAMILY MEDICINE

## 2019-11-27 PROCEDURE — 99207 ZZC PRENATAL VISIT: CPT | Performed by: FAMILY MEDICINE

## 2019-11-27 ASSESSMENT — MIFFLIN-ST. JEOR: SCORE: 1560.08

## 2019-11-27 NOTE — PATIENT INSTRUCTIONS
Please, call or return to clinic or go to the ER immediately if signs or symptoms worsen or fail to improve as anticipated.    Return in about 4 weeks (around 12/25/2019) for prenatal visit.   went over signs of preeclampsia ( sudden swelling of hands, feet or face, sudden severe headache or right upper abdominal pain, abruption ( any bleeding or spotting) , premature labor  today.     ,       Thank you for choosing [facility]  for your Health Care. It was a pleasure seeing you at your visit today. Please contact us with any questions or concerns you may have.                   Ashwini Garcia MD                            To reach your [facility] care team after hours call:   [phone]    Our clinic hours are:     Monday- 7:30 am - 7:00 pm                             Tuesday through Friday- 7:30 am - 5:00 pm                                        Saturday- 8:00 am - 12:00 pm                  Phone:  [phone]    Our pharmacy hours are:     Monday  8:00 am to 7:00 pm      Tuesday through Friday 8:00am to 6:00pm                        Saturday - 9:00 am to 1:00 pm      Sunday : Closed.              Phone:  [phone]      There is also information available at our web site:  [email or web address]    If your provider ordered any lab tests and you do not receive the results within 10 business days, please call the clinic.    If you need a medication refill please contact your pharmacy.  Please allow 2 business days for your refill to be completed.    Our clinic offers telephone visits and e visits.  Please ask one of your team members to explain more.      Use [employer] (secure email communication and access to your chart) to send your primary care provider a message or make an appointment. Ask someone on your Team how to sign up for Record[employer].

## 2019-11-29 LAB
# FETUSES US: NORMAL
# FETUSES: NORMAL
AFP ADJ MOM AMN: 1.3
AFP SERPL-MCNC: 46 NG/ML
AGE - REPORTED: 25.6 YR
CURRENT SMOKER: NORMAL
CURRENT SMOKER: NORMAL
DIABETES STATUS PATIENT: NORMAL
FAMILY MEMBER DISEASES HX: NORMAL
FAMILY MEMBER DISEASES HX: NORMAL
GA METHOD: NORMAL
GA METHOD: NORMAL
GA: NORMAL WK
HCG MOM SERPL: 1.78
HCG SERPL-ACNC: NORMAL IU/L
HX OF HEREDITARY DISORDERS: NO
IDDM PATIENT QL: NORMAL
INHIBIN A MOM SERPL: 0.91
INHIBIN A SERPL-MCNC: 135 PG/ML
INTEGRATED SCN PATIENT-IMP: NORMAL
IVF PREGNANCY: NORMAL
LMP START DATE: NORMAL
MONOCHORIONIC TWINS: NO
PATHOLOGY STUDY: NORMAL
PREV FETUS DEFECT: NO
SERVICE CMNT-IMP: NO
SPECIMEN DRAWN SERPL: NORMAL
U ESTRIOL MOM SERPL: 0.94
U ESTRIOL SERPL-MCNC: 1.27 NG/ML
VALPROIC/CARBAMAZEPINE STATUS: NO
WEIGHT UNITS: NORMAL

## 2019-12-11 DIAGNOSIS — Z34.81 ENCOUNTER FOR SUPERVISION OF OTHER NORMAL PREGNANCY IN FIRST TRIMESTER: ICD-10-CM

## 2019-12-13 NOTE — RESULT ENCOUNTER NOTE
Please call pt with results below:     No gross fetal anomalies observed;  corresponding   menstrual and sonographic dates.     Placenta is fundal.     Maternal Uterus appears Normal.  Maternal ovaries were visualized.  Amniotic fluid assessment is: Normal.     Spine incompletely visualized. Recommend repeat US in 1-2w.     Fetal anomalies may be present but not detected.     Triage,  Please order repeat US OB >14 weeks - to recheck fetal spine.     For additional lab test information, [email or web address] is an excellent reference.

## 2019-12-16 DIAGNOSIS — Z34.81 ENCOUNTER FOR SUPERVISION OF OTHER NORMAL PREGNANCY IN FIRST TRIMESTER: Primary | ICD-10-CM

## 2019-12-23 ENCOUNTER — HOSPITAL ENCOUNTER (OUTPATIENT)
Dept: ULTRASOUND IMAGING | Facility: CLINIC | Age: 24
Discharge: HOME | End: 2019-12-23
Attending: FAMILY MEDICINE | Admitting: FAMILY MEDICINE
Payer: COMMERCIAL

## 2019-12-23 DIAGNOSIS — Z34.81 ENCOUNTER FOR SUPERVISION OF OTHER NORMAL PREGNANCY IN FIRST TRIMESTER: ICD-10-CM

## 2019-12-23 PROCEDURE — 76816 OB US FOLLOW-UP PER FETUS: CPT

## 2019-12-27 NOTE — RESULT ENCOUNTER NOTE
Dear Micki,    Here is a summary of your recent test results:  IMPRESSION: Follow-up imaging demonstrates a normal-appearing fetal  spine.            Thank you very much for trusting me and [facility].     Healthy regards,  Reed Larios MD (I am covering for Dr Jose HENSLEY who is away from the office today.)

## 2020-01-03 ENCOUNTER — PRENATAL OFFICE VISIT (OUTPATIENT)
Dept: FAMILY MEDICINE | Facility: CLINIC | Age: 25
End: 2020-01-03
Payer: MEDICAID

## 2020-01-03 VITALS
HEIGHT: 66 IN | HEART RATE: 108 BPM | OXYGEN SATURATION: 92 % | TEMPERATURE: 98.2 F | SYSTOLIC BLOOD PRESSURE: 118 MMHG | WEIGHT: 180.2 LBS | DIASTOLIC BLOOD PRESSURE: 82 MMHG | BODY MASS INDEX: 28.96 KG/M2

## 2020-01-03 DIAGNOSIS — Z34.81 ENCOUNTER FOR SUPERVISION OF OTHER NORMAL PREGNANCY IN FIRST TRIMESTER: Primary | ICD-10-CM

## 2020-01-03 DIAGNOSIS — Z67.30 BLOOD TYPE AB+: ICD-10-CM

## 2020-01-03 DIAGNOSIS — R87.810 CERVICAL HIGH RISK HPV (HUMAN PAPILLOMAVIRUS) TEST POSITIVE: ICD-10-CM

## 2020-01-03 DIAGNOSIS — O26.842 UTERINE SIZE DATE DISCREPANCY PREGNANCY, SECOND TRIMESTER: ICD-10-CM

## 2020-01-03 LAB
ALBUMIN UR QL STRIP: NEGATIVE MG/DL
GLUCOSE UR STRIP-MCNC: NEGATIVE MG/DL

## 2020-01-03 PROCEDURE — 99207 ZZC COMPLICATED OB VISIT: CPT | Performed by: FAMILY MEDICINE

## 2020-01-03 PROCEDURE — 00000219 ZZHCL STATISTIC OBSA - URINALYSIS: Performed by: FAMILY MEDICINE

## 2020-01-03 ASSESSMENT — MIFFLIN-ST. JEOR: SCORE: 1579.13

## 2020-01-03 NOTE — PROGRESS NOTES
"  SUBJECTIVE:                                                    Micki Thorpe is here today for a Prenatal Visit at 22w5d gestation.    Concerns today:  Headaches- miultiple times /week - bitemporal - tylenol 2 extra strength - takes them away usually.  She just had her eyes checked about last spring.   Right paraspinous muscle pain - better with heat and tylenol as well - comes and goes.    Bleeding:  No  Cramping/Contractions:No  Leaking of fluid: No  Edema:  No  Baby moving:  Yes    Problem list and histories reviewed & adjusted, as indicated.  Additional history: as documented    Reviewed and updated as needed this visit by clinical staff       Reviewed and updated as needed this visit by Provider       ROS:   ROS: 12 point ROS neg other than the symptoms noted above.      OBJECTIVE:                                                    /82   Pulse 108   Temp 98.2  F (36.8  C)   Ht 1.676 m (5' 6\")   Wt 81.7 kg (180 lb 3.2 oz)   LMP 07/28/2019 (Exact Date)   SpO2 92%   BMI 29.09 kg/m    Body mass index is 29.09 kg/m .     Diagnostic test results:  Diagnostic Test Results:  Labs reviewed in Epic  Results for orders placed or performed in visit on 01/03/20 (from the past 24 hour(s))   Glucose and albumin, OB urine   Result Value Ref Range    Protein Albumin Urine Negative mg/dL    Glucose Urine Negative mg/dL        ASSESSMENT/PLAN:                                                        ICD-10-CM    1. Encounter for supervision of other normal pregnancy in first trimester Z34.81 Glucose and albumin, OB urine     OB hemoglobin   2. Cervical high risk HPV (human papillomavirus) test positive R87.810    3. Blood type AB+ - no need for rhogam Z67.30    4. Uterine size date discrepancy pregnancy, second trimester O26.842 US OB > 14 Weeks     Pt will please make appt with her eye doctor to have her eyes rechecked.  Girl on US from 12/23/2019.    Will get another us in 1.5-2 weeks secondary size greater than " dates- measuring 28cm today at 22.5 weeks EGA, .   Pt will be in [place] all next week.    See Patient Instructions for low back exercises .  Please, call or return to clinic or go to the ER immediately if signs or symptoms worsen or fail to improve as anticipated.          Ashwini Garcia MD    [facility] [place]

## 2020-01-03 NOTE — PATIENT INSTRUCTIONS
Back Exercises: Abdominal Lift  The Abdominal Lift strengthens your lower abdominal muscles, helping you keep your pelvis and back stable.    Lie on the floor with both knees bent. Put your feet flat on the floor and your arms by your sides. Tighten your abdominal muscles.    Lift one bent knee and move it toward your upper body. Keep your abdominal muscles tight and your back flat on the floor. Hold for 10 seconds.    Repeat 3 times. Then, switch legs.       6892-9420 [employer]. [address] [place] [address]. All rights reserved. This information is not intended as a substitute for professional medical care. Always follow your healthcare professional's instructions.        Back Exercises: Seated Rotation      To start, sit in a chair with your feet flat on the floor. Shift your weight slightly forward to avoid rounding your back. Relax, and keep your ears, shoulders, and hips aligned.    Fold your arms and elbows just below shoulder height.    Turn from the waist with hips forward. Turn your head last. Do not push through the pain.    Hold for a count of 5. Return to starting position.    Repeat 5 times on one side. Then switch sides.    0614-5628 [employer]. [address] [place] [address]. All rights reserved. This information is not intended as a substitute for professional medical care. Always follow your healthcare professional's instructions.        Back Exercises: Pelvic Tilt  To start, lie on your back with your knees bent and feet flat on the floor. Don t press your neck or lower back to the floor. Breathe deeply. You should feel comfortable and relaxed in this position.    Tighten your abdomen and buttocks, and press your lower back toward the floor. This should be a small, subtle movement. This should not increase your pain.    Hold for 5 seconds. Release.    Repeat 5 times.           2458-0209 [employer]. [address] [place] [address],  [place] [address]. All rights reserved. This information is not intended as a substitute for professional medical care. Always follow your healthcare professional's instructions.        Back Exercises: Partial Curl-Ups        To start, lie on your back with your knees bent and feet flat on the floor. Don t press your neck or lower back to the floor. Breathe deeply. You should feel comfortable and relaxed in this position.    Cross your arms loosely.    Tighten your abdomen and curl FPC up, keeping your head in line with your shoulders.    Hold for 5 seconds. Uncurl to lie down.    Repeat 5 times.     9903-8726 [employer]. [address] [place] [address]. All rights reserved. This information is not intended as a substitute for professional medical care. Always follow your healthcare professional's instructions.        Back Exercises: Lower Back Stretch                        To start, sit in a chair with your feet flat on the floor. Shift your weight slightly forward to avoid rounding your back. Relax, and keep your ears, shoulders, and hips aligned.    Sit with your feet well apart.    Bend forward and touch the floor with the backs of your hands. Relax and let your body drop.    Hold for 20 seconds. Return to starting position.    Repeat 2 times.     6174-1860 [employer]. [address] [place] [address]. All rights reserved. This information is not intended as a substitute for professional medical care. Always follow your healthcare professional's instructions.        Back Exercises: Lower Back Rotation  To start, lie on your back with your knees bent and feet flat on the floor. Don t press your neck or lower back to the floor. Breathe deeply. You should feel comfortable and relaxed in this position.    Drop both knees to one side. Turn your head to the other side. Keep your shoulders flat on the floor.    Do not push through pain.    Hold  for 20 seconds.    Slowly switch sides.    Repeat 2 times.                             6395-3601 [employer]. [address] [place] [address]. All rights reserved. This information is not intended as a substitute for professional medical care. Always follow your healthcare professional's instructions.        Back Exercises: Leg Reach        Do this exercise on your hands and knees. Keep your knees under your hips and your hands under your shoulders. Keep your spine in a neutral position (not arched or sagging). Be sure to maintain your neck s natural curve.    Extend one leg straight back. Don t arch your back or let your head or body sag.    Hold for 5 seconds. Return to starting position.    Repeat 5 times.    Switch legs.     0416-8526 [employer]. [address] [place] [address]. All rights reserved. This information is not intended as a substitute for professional medical care. Always follow your healthcare professional's instructions.        Back Exercises: Leg Pull        To start, lie on your back with your knees bent and feet flat on the floor. Don t press your neck or lower back to the floor. Breathe deeply. You should feel comfortable and relaxed in this position.    Pull one knee to your chest.    Hold for 30-60 seconds. Return to starting position.    Repeat 2 times.    Switch legs.    For a double leg pull, pull both legs to your chest at the same time. Repeat 2 times.  For your safety, check with your healthcare provider before starting an exercise program.     1132-3472 [employer]. [address] [place] [address]. All rights reserved. This information is not intended as a substitute for professional medical care. Always follow your healthcare professional's instructions.        Back Exercises: Knee Lift        To start, lie on your back with your knees bent and feet flat on the floor. Don t press your neck or lower back to the  floor. Breathe deeply. You should feel comfortable and relaxed in this position.    Lift one bent knee and move it toward your upper body. Keep your abdominal muscles tight and your back flat on the floor.    Hold for 10 seconds. Return to start position.    Repeat 3 times.    Switch legs.    9103-4280 [employer]. [address] [place] [address]. All rights reserved. This information is not intended as a substitute for professional medical care. Always follow your healthcare professional's instructions.        Back Exercises: Hip Rotator Stretch        To start, lie on your back with your knees bent and feet flat on the floor. Don t press your neck or lower back to the floor. Breathe deeply. You should feel comfortable and relaxed in this position.    Rest your right ankle on your left knee.    Place a towel behind your left thigh and use it to pull the knee toward your chest. Feel the stretch in your buttocks.    Hold for 30-60 seconds. Release.    Repeat 2 times.    Switch legs.   For your safety, check with your healthcare provider before starting an exercise program.     9799-3777 [employer]. [address] [place] [address]. All rights reserved. This information is not intended as a substitute for professional medical care. Always follow your healthcare professional's instructions.        Back Exercises: Hip Lift        To start, lie on your back with your knees bent and feet flat on the floor. Don t press your neck or lower back to the floor. Breathe deeply. You should feel comfortable and relaxed in this position.    Slowly raise your hips upward.    Tighten your abdomen and buttocks. Be careful not to arch your back.    Hold for 5 seconds. Lower your hips to the floor.    Repeat 10 times.  For your safety, check with your healthcare provider before starting an exercise program.     4528-1127 [employer]. [address] [place] [address].  All rights reserved. This information is not intended as a substitute for professional medical care. Always follow your healthcare professional's instructions.        Back Exercises: Back Extension with Elbow Press    To start, lie face down on your stomach, feet slightly apart, forehead on the floor. Breathe deeply. You should feel comfortable and relaxed in this position.    Press up on your forearms. Keep your abdomen and hips on the floor. Stay within your painfree range.    Hold for 20 seconds. Lower slowly.    Repeat 2 times.    Return to starting position.    9612-1574 [employer] [address] [place] [address]. All rights reserved. This information is not intended as a substitute for professional medical care. Always follow your healthcare professional's instructions.        Back Exercises: Bridge  The Bridge exercise strengthens your abdominal, buttocks, and hamstring muscles. This helps keep your back stable and aligned when you walk.    Lie on the floor with your back and palms flat. Bend your knees. Keep your feet flat on the floor.    Contract your abdominal and buttocks muscles. Slowly lift your buttocks off the floor until there is a straight line from your knees to your shoulders.    Hold for 5  seconds. Repeat 10 times.      0916-6615 [employer]. [address] [place] [address]. All rights reserved. This information is not intended as a substitute for professional medical care. Always follow your healthcare professional's instructions.        Back Exercises: Back Release  Do this exercise on your hands and knees. Keep your knees under your hips and your hands under your shoulders. Keep your spine in a neutral position (not arched or sagging). Be sure to maintain your neck s natural curve.    Relax your abdominal and buttocks muscles, lift your head, and let your back sag. Be sure to keep your weight evenly distributed. Don t sit back on your hips.      Hold for 5 seconds.    Return to starting position.    Repeat 5 times.    7317-2080 The [employer]. [address] [place] [address], [place] [address]. All rights reserved. This information is not intended as a substitute for professional medical care. Always follow your healthcare professional's instructions.    Thank you so much or choosing [facility]  for your Health Care. It was a pleasure seeing you at your visit today! Please contact us with any questions or concerns you may have.                   Ashwini Garcia MD                              To reach your [facility] care team after hours call:   [phone]    Our clinic hours are:     Monday- 7:30 am - 7:00 pm                             Tuesday through Friday- 7:30 am - 5:00 pm                                        Saturday- 8:00 am - 12:00 pm                  Phone:  [phone]    Our pharmacy hours are:     Monday  8:00 am to 7:00 pm      Tuesday through Friday 8:00am to 6:00pm                        Saturday - 9:00 am to 1:00 pm      Sunday : Closed.              Phone:  [phone]      There is also information available at our web site:  [email or web address]    If your provider ordered any lab tests and you do not receive the results within 10 business days, please call the clinic.    If you need a medication refill please contact your pharmacy.  Please allow 2 business days for your refill to be completed.    Our clinic offers telephone visits and e visits.  Please ask one of your team members to explain more.      Use [employer] (secure email communication and access to your chart) to send your primary care provider a message or make an appointment. Ask someone on your Team how to sign up for [employer].

## 2020-01-13 ENCOUNTER — HOSPITAL ENCOUNTER (OUTPATIENT)
Dept: ULTRASOUND IMAGING | Facility: CLINIC | Age: 25
Discharge: HOME | End: 2020-01-13
Attending: FAMILY MEDICINE | Admitting: FAMILY MEDICINE
Payer: MEDICAID

## 2020-01-13 DIAGNOSIS — O26.842 UTERINE SIZE DATE DISCREPANCY PREGNANCY, SECOND TRIMESTER: ICD-10-CM

## 2020-01-13 PROCEDURE — 76816 OB US FOLLOW-UP PER FETUS: CPT

## 2020-02-12 ENCOUNTER — PRENATAL OFFICE VISIT (OUTPATIENT)
Dept: FAMILY MEDICINE | Facility: CLINIC | Age: 25
End: 2020-02-12
Payer: COMMERCIAL

## 2020-02-12 VITALS
OXYGEN SATURATION: 98 % | HEART RATE: 99 BPM | DIASTOLIC BLOOD PRESSURE: 78 MMHG | SYSTOLIC BLOOD PRESSURE: 120 MMHG | WEIGHT: 187.8 LBS | TEMPERATURE: 98.1 F | BODY MASS INDEX: 30.31 KG/M2

## 2020-02-12 DIAGNOSIS — O26.842 UTERINE SIZE DATE DISCREPANCY PREGNANCY, SECOND TRIMESTER: ICD-10-CM

## 2020-02-12 DIAGNOSIS — Z34.80 SUPERVISION OF OTHER NORMAL PREGNANCY: Primary | ICD-10-CM

## 2020-02-12 DIAGNOSIS — Z67.30 BLOOD TYPE AB+: ICD-10-CM

## 2020-02-12 LAB
ALBUMIN UR QL STRIP: NORMAL MG/DL
GLUCOSE UR STRIP-MCNC: NEGATIVE MG/DL
HGB BLD-MCNC: 11.2 G/DL (ref 11.7–15.7)

## 2020-02-12 PROCEDURE — 00000219 ZZHCL STATISTIC OBSA - URINALYSIS: Performed by: FAMILY MEDICINE

## 2020-02-12 PROCEDURE — 99207 ZZC PRENATAL VISIT: CPT | Performed by: FAMILY MEDICINE

## 2020-02-12 PROCEDURE — 36415 COLL VENOUS BLD VENIPUNCTURE: CPT | Performed by: FAMILY MEDICINE

## 2020-02-12 PROCEDURE — 00000218 ZZHCL STATISTIC OBHBG - HEMOGLOBIN: Performed by: FAMILY MEDICINE

## 2020-02-12 PROCEDURE — 82950 GLUCOSE TEST: CPT | Performed by: FAMILY MEDICINE

## 2020-02-12 ASSESSMENT — ANXIETY QUESTIONNAIRES
IF YOU CHECKED OFF ANY PROBLEMS ON THIS QUESTIONNAIRE, HOW DIFFICULT HAVE THESE PROBLEMS MADE IT FOR YOU TO DO YOUR WORK, TAKE CARE OF THINGS AT HOME, OR GET ALONG WITH OTHER PEOPLE: NOT DIFFICULT AT ALL
2. NOT BEING ABLE TO STOP OR CONTROL WORRYING: NOT AT ALL
7. FEELING AFRAID AS IF SOMETHING AWFUL MIGHT HAPPEN: NOT AT ALL
5. BEING SO RESTLESS THAT IT IS HARD TO SIT STILL: NOT AT ALL
1. FEELING NERVOUS, ANXIOUS, OR ON EDGE: NOT AT ALL
3. WORRYING TOO MUCH ABOUT DIFFERENT THINGS: NOT AT ALL
6. BECOMING EASILY ANNOYED OR IRRITABLE: NOT AT ALL
GAD7 TOTAL SCORE: 0

## 2020-02-12 ASSESSMENT — PATIENT HEALTH QUESTIONNAIRE - PHQ9
5. POOR APPETITE OR OVEREATING: NOT AT ALL
SUM OF ALL RESPONSES TO PHQ QUESTIONS 1-9: 2

## 2020-02-12 NOTE — PROGRESS NOTES
SUBJECTIVE:                                                    Micki Thorpe is here today for a Prenatal Visit at 28w3d gestation.    Concerns today:  none  Bleeding:  No  Cramping/Contractions:Yes mel stubbs  Leaking of fluid: No  Edema:  No  Baby moving:  Yes  O: See OB Vitals    PHQ-9 score:    PHQ-9 SCORE 2/12/2020   PHQ-9 Total Score -   PHQ-9 Total Score 2     ZULMA-7 SCORE 3/8/2017 9/5/2019 2/12/2020   Total Score - - -   Total Score 7 0 0       Patient Active Problem List   Diagnosis     Blood type AB+ - no need for rhogam     CARDIOVASCULAR SCREENING; LDL GOAL LESS THAN 160     Unplanned wanted pregnancy - x 2- both on ocp's - 2nd on micronor      Cervical high risk HPV (human papillomavirus) test positive     Anxiety     Irritability and anger     Dysplastic nevus of trunk - Compound dysplastic nevus with moderate to severe atypia s/p MOHS procedure - Dr. Clay      Benign paroxysmal positional vertigo of right ear     Strain of neck muscle, initial encounter     Uterine size date discrepancy pregnancy, second trimester- measuring 28cm at 22.5wks EGA        Current Outpatient Medications   Medication Sig Dispense Refill     fluocinonide (LIDEX) 0.05 % external solution Apply to scalp BID x 2-4 weeks. Do not use on face. Taper with improvement 60 mL 3     Prenatal Vit-Fe Fumarate-FA (PRENATAL VITAMIN) 27-0.8 MG TABS Take 1 tablet by mouth daily          No Known Allergies         Problem list and histories reviewed & adjusted, as indicated.  Additional history: as documented    Reviewed and updated as needed this visit by clinical staff  Tobacco  Allergies  Med Hx  Surg Hx  Fam Hx  Soc Hx      Reviewed and updated as needed this visit by Provider         ROS:   ROS: 12 point ROS neg other than the symptoms noted above    OBJECTIVE:                                                    /78   Pulse 99   Temp 98.1  F (36.7  C)   Wt 85.2 kg (187 lb 12.8 oz)   LMP 07/28/2019 (Exact Date)    SpO2 98%   BMI 30.31 kg/m    Body mass index is 30.31 kg/m .   GENERAL: healthy, alert, well nourished, well hydrated, no distress    Diagnostic test results:  Diagnostic Test Results:  Labs reviewed in Epic  Results for orders placed or performed in visit on 02/12/20 (from the past 24 hour(s))   Glucose and albumin, OB urine   Result Value Ref Range    Protein Albumin Urine Trace mg/dL    Glucose Urine Negative mg/dL        ASSESSMENT/PLAN:                                                        ICD-10-CM    1. Supervision of other normal pregnancy Z34.80 Glucose and albumin, OB urine     Glucose tolerance gest screen 1 hour     OB hemoglobin   2. Blood type AB+ - no need for rhogam Z67.30    3. Uterine size date discrepancy pregnancy, second trimester- measuring 28cm at 22.5wks EGA  O26.842      Measuring 30cm at 28+ 3/7 weeks today. US on 1/3/2020 was normal for EGA.    went over signs of preeclampsia ( sudden swelling of hands, feet or face, sudden severe headache or right upper abdominal pain, abruption ( any bleeding or spotting) , premature labor, and fetal kick counts today.    Return in about 2 weeks (around 2/26/2020) for prenatal visit.   See Patient Instructions         Ashwini Garcia MD    [facility]

## 2020-02-12 NOTE — PATIENT INSTRUCTIONS
Adapting to Pregnancy: Third Trimester  Although common during pregnancy, some discomforts may seem worse in the final weeks. Simple lifestyle changes can help. Take care of yourself. And ask your partner to help out with small tasks.  Limiting leg problems  Ways to combat leg issues:    Wear support hose all day.    Avoid snug shoes and clothes that bind, like tight pants and socks with elastic tops.    Sit with your feet and legs raised often.  Caring for your breasts  Tips to follow include:    Wash with plain water. Avoid using harsh soaps or rubbing alcohol. They may cause dryness.    Wear a nursing bra for extra support. It can also hide any leaks from your nipples.  Controlling hemorrhoids  Ways to avoid hemorrhoids include:    Eat foods that are high in fiber. Also, exercise and drink enough fluids. This will reduce constipation and hemorrhoids.    Sleep and nap on your side. This limits pressure on the veins of your rectum.    Try not to stand or sit for long periods.  Controlling back pain  As your body changes during pregnancy, your back must work in new ways. Back pain is due to many causes. Physical changes in your body can strain your back and its supporting muscles. Also, hormones (chemicals that carry messages throughout the body) increase during pregnancy. This can affect how your muscles and joints work together. All of these changes can lead to pain. Pain may be felt in the upper or lower back. Pain is also common in the pelvis. Some pregnant women have sciatica. This is pain caused by pressure on the sciatic nerve running down the back of the leg. Ask your healthcare provider for specific tips and exercises to help control your back pain.  Tips to help you rest    Good rest and sleep will help you feel better. Here are some ideas:    Ask your partner to massage your shoulders, neck, or back.    Limit the errands you do each day.    Lie down in the afternoon or after work for a few  minutes.    Take a warm bath before you go to sleep.    Drink warm milk or teas without caffeine.    Avoid coffee, black tea, and cola.  Stopping heartburn    Avoid spicy or acidic foods.    Eat small amounts more often. Eat slowly.   Wait 2 hours after eating before lying down.    Sleep with your upper body raised 6 inches.   Managing mood swings  Ways to manage mood swings include:    Know that mood changes are normal.    Exercise often, but get plenty of rest.    Address any concerns and limit stress. Talking to your partner, other women, or your healthcare provider may help.  Dealing with urinary frequency  Tips to deal with having to urinate often include:    Drink plenty of water all day. If you drink a lot in the evening, though, you may have to get up more in the night.    Limit coffee, black tea, and cola.    4956-7745 [employer]. [address] [place] [address]. All rights reserved. This information is not intended as a substitute for professional medical care. Always follow your healthcare professional's instructions.        Pregnancy: Your Third Trimester Changes  As the baby grows, your body changes too. You may also see signs that your body is getting ready for labor. Be patient. Within a few more weeks, your baby will be born.    How you are changing  Your body is preparing for the birth of your baby. Some of the most common changes are listed below. If you have any questions or concerns, ask your healthcare provider:    You ll gain more weight from fluids, extra blood, and fat deposits.    Your breasts will grow as your body gets ready to feed the baby. They may be more tender. You may also notice a slight yellow or white discharge from the nipples.    Discharge from your vagina may increase. This is normal.    You might see some skin color changes on your forehead, cheeks, or nose. Most of these will go away after you deliver.  How your baby is growing    Month 7  Your  baby can open and close his or her eyes, and weighs around 4 pounds. If born prematurely (too early), your baby would likely survive with special care.   Month 8  Your baby is building up body fat, and weighs around 6 pounds.    Month 9  Your baby weighs nearly 7 pounds and is about 18 to 20 inches long. In other words, any day now...     8155-7603 [employer]. [address] [place] [address], [place] [address]. All rights reserved. This information is not intended as a substitute for professional medical care. Always follow your healthcare professional's instructions.                 Thank you for choosing [facility]  for your Health Care. It was a pleasure seeing you at your visit today. Please contact us with any questions or concerns you may have.                   Ashwini Garcia MD                                  To reach your [facility] care team after hours call:   [phone]    Our clinic hours are:     Monday- 7:30 am - 7:00 pm                             Tuesday through Friday- 7:30 am - 5:00 pm                                        Saturday- 8:00 am - 12:00 pm                  Phone:  [phone]    Our pharmacy hours are:     Monday  8:00 am to 7:00 pm      Tuesday through Friday 8:00am to 6:00pm                        Saturday - 9:00 am to 1:00 pm      Sunday : Closed.              Phone:  [phone]      There is also information available at our web site:  [email or web address]    If your provider ordered any lab tests and you do not receive the results within 10 business days, please call the clinic.    If you need a medication refill please contact your pharmacy.  Please allow 2 business days for your refill to be completed.    Our clinic offers telephone visits and e visits.  Please ask one of your team members to explain more.      Use [employer] (secure email communication and access to your chart) to send your primary care provider a message or  make an appointment. Ask someone on your Team how to sign up for [employer].               went over signs of preeclampsia ( sudden swelling of hands, feet or face, sudden severe headache or right upper abdominal pain, abruption ( any bleeding or spotting) , premature labor, and fetal kick counts today.       Thank you so much or choosing [facility]  for your Health Care. It was a pleasure seeing you at your visit today! Please contact us with any questions or concerns you may have.                   Ashwini Garcia MD                              To reach your [facility] care team after hours call:   [phone]    Our clinic hours are:     Monday- 7:30 am - 7:00 pm                             Tuesday through Friday- 7:30 am - 5:00 pm                                        Saturday- 8:00 am - 12:00 pm                  Phone:  [phone]    Our pharmacy hours are:     Monday  8:00 am to 7:00 pm      Tuesday through Friday 8:00am to 6:00pm                        Saturday - 9:00 am to 1:00 pm      Sunday : Closed.              Phone:  [phone]      There is also information available at our web site:  [email or web address]    If your provider ordered any lab tests and you do not receive the results within 10 business days, please call the clinic.    If you need a medication refill please contact your pharmacy.  Please allow 2 business days for your refill to be completed.    Our clinic offers telephone visits and e visits.  Please ask one of your team members to explain more.      Use [employer] (secure email communication and access to your chart) to send your primary care provider a message or make an appointment. Ask someone on your Team how to sign up for [employer].

## 2020-02-13 LAB — GLUCOSE 1H P 50 G GLC PO SERPL-MCNC: 88 MG/DL (ref 60–129)

## 2020-02-13 ASSESSMENT — ANXIETY QUESTIONNAIRES: GAD7 TOTAL SCORE: 0

## 2020-02-18 ENCOUNTER — TELEPHONE (OUTPATIENT)
Dept: FAMILY MEDICINE | Facility: CLINIC | Age: 25
End: 2020-02-18

## 2020-02-18 DIAGNOSIS — Z91.89 AT RISK FOR INEFFECTIVE BREASTFEEDING: Primary | ICD-10-CM

## 2020-02-18 DIAGNOSIS — Z34.80 SUPERVISION OF OTHER NORMAL PREGNANCY: ICD-10-CM

## 2020-02-18 NOTE — TELEPHONE ENCOUNTER
Reason for Call:  Other prescription    Detailed comments: Pt called this afternoon and would like Dr. Garcia to place an order/ prescription through [facility] for a breast pump. Please fax this order/ prescription over to the following fax number: [phone]. Thank you.    Phone Number Patient can be reached at: Home number on file [phone] (home)    Best Time:     Can we leave a detailed message on this number? YES    Call taken on 2/18/2020 at 3:59 PM by Valencia Mckee

## 2020-02-18 NOTE — TELEPHONE ENCOUNTER
DME order pending.    Routing to  to review and sign.      CINDY Martinez, RN, PHN  [facility]  Office: [phone]  Fax: [phone]

## 2020-02-20 NOTE — TELEPHONE ENCOUNTER
Prescription faxed to [facility] at [phone]            Kenia Corona

## 2020-02-20 NOTE — TELEPHONE ENCOUNTER
Message handled by nurse triage.       PLAN: Huddle with provider, Dr. Garcia , plan includes okay for DME for breast pump.    Order printed and signed by JV.    Please fax.        CINDY Martinez, RN, N  [facility]  Office: [phone]  Fax: [phone]

## 2020-02-25 NOTE — PROGRESS NOTES
SUBJECTIVE:                                                    Micki Thorpe is here today for a Prenatal Visit at 30w2d gestation.    Concerns today:  none  Bleeding:  No  Cramping/Contractions:No  Leaking of fluid: No  Edema:  No  Baby moving:  Yes  O: See OB Vitals      Problem list and histories reviewed & adjusted, as indicated.  Additional history: as documented    Reviewed and updated as needed this visit by clinical staff  Tobacco  Allergies  Meds  Med Hx  Surg Hx  Fam Hx  Soc Hx      Reviewed and updated as needed this visit by Provider         ROS:   ROS: 12 point ROS neg other than the symptoms noted above    OBJECTIVE:                                                    /84   Pulse 88   Wt 85.5 kg (188 lb 6.4 oz)   LMP 07/28/2019 (Exact Date)   SpO2 98%   BMI 30.41 kg/m    Body mass index is 30.41 kg/m .   GENERAL: healthy, alert, well nourished, well hydrated, no distress    Hemoglobin   Date Value Ref Range Status   02/12/2020 11.2 (L) 11.7 - 15.7 g/dL Final   ]  1 hr GCT on 2/12/2020 = 88 .       Diagnostic test results:  Diagnostic Test Results:  Labs reviewed in Epic  Results for orders placed or performed in visit on 02/26/20 (from the past 24 hour(s))   Glucose albumin OB urine   Result Value Ref Range    Protein Albumin Urine Negative mg/dL    Glucose Urine Negative mg/dL        ASSESSMENT/PLAN:                                                        ICD-10-CM    1. Encounter for supervision of other normal pregnancy, third trimester Z34.83 Glucose albumin OB urine   2. Uterine size date discrepancy pregnancy, second trimester O26.842 Glucose albumin OB urine   3. Blood type AB+ Z67.30        See Patient Instructions. Please, call or return to clinic or go to the ER immediately if signs or symptoms worsen or fail to improve as anticipated. Return in about 2 weeks (around 3/11/2020) for prenatal visit.   went over signs of preeclampsia ( sudden swelling of hands, feet or face,  sudden severe headache or right upper abdominal pain, abruption ( any bleeding or spotting) , premature labor, and fetal kick counts today.            Ashwini Garcia MD    [facility]

## 2020-02-26 ENCOUNTER — PRENATAL OFFICE VISIT (OUTPATIENT)
Dept: FAMILY MEDICINE | Facility: CLINIC | Age: 25
End: 2020-02-26
Payer: COMMERCIAL

## 2020-02-26 VITALS
DIASTOLIC BLOOD PRESSURE: 84 MMHG | HEART RATE: 88 BPM | BODY MASS INDEX: 30.41 KG/M2 | OXYGEN SATURATION: 98 % | WEIGHT: 188.4 LBS | SYSTOLIC BLOOD PRESSURE: 110 MMHG

## 2020-02-26 DIAGNOSIS — Z67.30 BLOOD TYPE AB+: ICD-10-CM

## 2020-02-26 DIAGNOSIS — Z34.83 ENCOUNTER FOR SUPERVISION OF OTHER NORMAL PREGNANCY, THIRD TRIMESTER: Primary | ICD-10-CM

## 2020-02-26 DIAGNOSIS — O26.842 UTERINE SIZE DATE DISCREPANCY PREGNANCY, SECOND TRIMESTER: ICD-10-CM

## 2020-02-26 LAB
ALBUMIN UR QL STRIP: NEGATIVE MG/DL
GLUCOSE UR STRIP-MCNC: NEGATIVE MG/DL

## 2020-02-26 PROCEDURE — 00000219 ZZHCL STATISTIC OBSA - URINALYSIS: Performed by: FAMILY MEDICINE

## 2020-02-26 PROCEDURE — 99207 ZZC PRENATAL VISIT: CPT | Performed by: FAMILY MEDICINE

## 2020-02-26 NOTE — PATIENT INSTRUCTIONS
went over signs of preeclampsia ( sudden swelling of hands, feet or face, sudden severe headache or right upper abdominal pain, abruption ( any bleeding or spotting) , premature labor, and fetal kick counts today.     Return in about 2 weeks (around 3/11/2020) for prenatal visit.     Thank you so much or choosing [facility]  for your Health Care. It was a pleasure seeing you at your visit today! Please contact us with any questions or concerns you may have.                   Ashwini Garcia MD                              To reach your [facility] care team after hours call:   [phone]    Our clinic hours are:     Monday- 7:30 am - 7:00 pm                             Tuesday through Friday- 7:30 am - 5:00 pm                                        Saturday- 8:00 am - 12:00 pm                  Phone:  [phone]    Our pharmacy hours are:     Monday  8:00 am to 7:00 pm      Tuesday through Friday 8:00am to 6:00pm                        Saturday - 9:00 am to 1:00 pm      Sunday : Closed.              Phone:  [phone]      There is also information available at our web site:  [email or web address]    If your provider ordered any lab tests and you do not receive the results within 10 business days, please call the clinic.    If you need a medication refill please contact your pharmacy.  Please allow 2 business days for your refill to be completed.    Our clinic offers telephone visits and e visits.  Please ask one of your team members to explain more.      Use [employer] (secure email communication and access to your chart) to send your primary care provider a message or make an appointment. Ask someone on your Team how to sign up for [employer].

## 2020-03-11 ENCOUNTER — PRENATAL OFFICE VISIT (OUTPATIENT)
Dept: FAMILY MEDICINE | Facility: CLINIC | Age: 25
End: 2020-03-11
Payer: COMMERCIAL

## 2020-03-11 VITALS
BODY MASS INDEX: 30.86 KG/M2 | WEIGHT: 192 LBS | HEART RATE: 103 BPM | TEMPERATURE: 98.3 F | OXYGEN SATURATION: 98 % | DIASTOLIC BLOOD PRESSURE: 82 MMHG | SYSTOLIC BLOOD PRESSURE: 120 MMHG | HEIGHT: 66 IN

## 2020-03-11 DIAGNOSIS — Z23 NEED FOR TDAP VACCINATION: ICD-10-CM

## 2020-03-11 DIAGNOSIS — O26.842 UTERINE SIZE DATE DISCREPANCY PREGNANCY, SECOND TRIMESTER: ICD-10-CM

## 2020-03-11 DIAGNOSIS — Z34.83 ENCOUNTER FOR SUPERVISION OF OTHER NORMAL PREGNANCY, THIRD TRIMESTER: Primary | ICD-10-CM

## 2020-03-11 DIAGNOSIS — Z67.30 BLOOD TYPE AB+: ICD-10-CM

## 2020-03-11 LAB
ALBUMIN UR QL STRIP: NEGATIVE MG/DL
GLUCOSE UR STRIP-MCNC: NEGATIVE MG/DL

## 2020-03-11 PROCEDURE — 99207 ZZC PRENATAL VISIT: CPT | Performed by: FAMILY MEDICINE

## 2020-03-11 PROCEDURE — 00000219 ZZHCL STATISTIC OBSA - URINALYSIS: Performed by: FAMILY MEDICINE

## 2020-03-11 PROCEDURE — 90471 IMMUNIZATION ADMIN: CPT | Performed by: FAMILY MEDICINE

## 2020-03-11 PROCEDURE — 90715 TDAP VACCINE 7 YRS/> IM: CPT | Performed by: FAMILY MEDICINE

## 2020-03-11 ASSESSMENT — MIFFLIN-ST. JEOR: SCORE: 1632.66

## 2020-03-11 NOTE — PATIENT INSTRUCTIONS
went over signs of preeclampsia ( sudden swelling of hands, feet or face, sudden severe headache or right upper abdominal pain, abruption ( any bleeding or spotting) , premature labor, and fetal kick counts today.     Thank you so much or choosing [facility]  for your Health Care. It was a pleasure seeing you at your visit today! Please contact us with any questions or concerns you may have.                   Ashwini Garcia MD                              To reach your [facility] care team after hours call:   [phone]    Our clinic hours are:     Monday- 7:30 am - 7:00 pm                             Tuesday through Friday- 7:30 am - 5:00 pm                                        Saturday- 8:00 am - 12:00 pm                  Phone:  [phone]    Our pharmacy hours are:     Monday  8:00 am to 7:00 pm      Tuesday through Friday 8:00am to 6:00pm                        Saturday - 9:00 am to 1:00 pm      Sunday : Closed.              Phone:  [phone]      There is also information available at our web site:  [email or web address]    If your provider ordered any lab tests and you do not receive the results within 10 business days, please call the clinic.    If you need a medication refill please contact your pharmacy.  Please allow 2 business days for your refill to be completed.    Our clinic offers telephone visits and e visits.  Please ask one of your team members to explain more.      Use [employer] (secure email communication and access to your chart) to send your primary care provider a message or make an appointment. Ask someone on your Team how to sign up for [employer].                 Return in about 2 weeks (around 3/25/2020) for prenatal visit.

## 2020-03-11 NOTE — PROGRESS NOTES
Prior to immunization administration, verified patients identity using patient s name and date of birth. Please see Immunization Activity for additional information.     Screening Questionnaire for Adult Immunization    Are you sick today?   No   Do you have allergies to medications, food, a vaccine component or latex?   No   Have you ever had a serious reaction after receiving a vaccination?   No   Do you have a long-term health problem with heart, lung, kidney, or metabolic disease (e.g., diabetes), asthma, a blood disorder, no spleen, complement component deficiency, a cochlear implant, or a spinal fluid leak?  Are you on long-term aspirin therapy?   No   Do you have cancer, leukemia, HIV/AIDS, or any other immune system problem?   No   Do you have a parent, brother, or sister with an immune system problem?   No   In the past 3 months, have you taken medications that affect  your immune system, such as prednisone, other steroids, or anticancer drugs; drugs for the treatment of rheumatoid arthritis, Crohn s disease, or psoriasis; or have you had radiation treatments?   No   Have you had a seizure, or a brain or other nervous system problem?   No   During the past year, have you received a transfusion of blood or blood    products, or been given immune (gamma) globulin or antiviral drug?   No   For women: Are you pregnant or is there a chance you could become       pregnant during the next month?   Yes   Have you received any vaccinations in the past 4 weeks?   No     Immunization questionnaire was positive for at least one answer.  Notified Dr. garcia .        Per orders of Dr. Garcia, injection of TDAP given by Trang Guerrero MA. Patient instructed to remain in clinic for 15 minutes afterwards, and to report any adverse reaction to me immediately.       Screening performed by Trang Guerrero MA on 3/11/2020 at 12:42 PM.

## 2020-03-11 NOTE — PROGRESS NOTES
"  SUBJECTIVE:                                                    Micki Thorpe is here today for a Prenatal Visit at 32w3d gestation.    Concerns today:  None   Bleeding:  No  Cramping/Contractions:No  Leaking of fluid: No  Edema:  No  Baby moving:  Yes  O: See OB Vitals      Problem list and histories reviewed & adjusted, as indicated.  Additional history: as documented    Reviewed and updated as needed this visit by clinical staff  Tobacco  Allergies  Meds  Med Hx  Surg Hx  Fam Hx  Soc Hx      Reviewed and updated as needed this visit by Provider         ROS:   ROS: 12 point ROS neg other than the symptoms noted above    OBJECTIVE:                                                    /82   Pulse 103   Temp 98.3  F (36.8  C) (Oral)   Ht 1.676 m (5' 6\")   Wt 87.1 kg (192 lb)   LMP 07/28/2019 (Exact Date)   SpO2 98%   BMI 30.99 kg/m    Body mass index is 30.99 kg/m .   GENERAL: healthy, alert, well nourished, well hydrated, no distress  HENT: ear canals- normal; TMs- normal; Nose- normal; Mouth- no ulcers, no lesions  NECK: no tenderness, no adenopathy, no asymmetry, no masses, no stiffness; thyroid- normal to palpation  RESP: lungs clear to auscultation - no rales, no rhonchi, no wheezes  CV: regular rates and rhythm, normal S1 S2, no S3 or S4 and no murmur, no click or rub -  ABDOMEN: soft, no tenderness, no  hepatosplenomegaly, no masses, normal bowel sounds  MS: extremities- no gross deformities noted, no edema    Diagnostic test results:  Diagnostic Test Results:  Labs reviewed in Epic  Results for orders placed or performed in visit on 03/11/20 (from the past 24 hour(s))   Glucose and albumin, OB urine   Result Value Ref Range    Protein Albumin Urine Negative mg/dL    Glucose Urine Negative mg/dL        ASSESSMENT/PLAN:                                                        ICD-10-CM    1. Encounter for supervision of other normal pregnancy, third trimester  Z34.83 Glucose and albumin, OB " urine   2. Uterine size date discrepancy pregnancy, second trimester  O26.842    3. Blood type AB+  Z67.30    4. Need for Tdap vaccination  Z23 TDAP, IM (10 - 64 YRS) - Adacel     ADMIN 1st VACCINE     Passed 1 hr gct = 88. Will to Tdap booster today.   No need for rhogam as pt is AB+.    Hemoglobin   Date Value Ref Range Status   02/12/2020 11.2 (L) 11.7 - 15.7 g/dL Final       See Patient Instructions. went over signs of preeclampsia ( sudden swelling of hands, feet or face, sudden severe headache or right upper abdominal pain, abruption ( any bleeding or spotting) , premature labor, and fetal kick counts today.            Ashwini Garcia MD    [facility]

## 2020-03-20 ENCOUNTER — TELEPHONE (OUTPATIENT)
Dept: FAMILY MEDICINE | Facility: CLINIC | Age: 25
End: 2020-03-20

## 2020-03-20 NOTE — TELEPHONE ENCOUNTER
Called pt on her cell phone:. Due to COVID-19 coronavirus pandemic , we are changing the way we do prenatal visits to minimize our low risk pregnant patients' potential exposure to the virus.  Every other visit starting at 10 weeks EGA is a virtual/telephone visit with potential for lab only appointments or Ultrasound only visits scheduled as well.      Pt voiced understanding about that and appreciated the information.  We changed her next appointment on 3/25/2020 at 34 wks EGA   to a virtual/telephone visit and will update her other appointments as the COVID-19 coronavirus pandemic evolves.

## 2020-03-25 ENCOUNTER — VIRTUAL VISIT (OUTPATIENT)
Dept: FAMILY MEDICINE | Facility: CLINIC | Age: 25
End: 2020-03-25
Payer: COMMERCIAL

## 2020-03-25 DIAGNOSIS — Z67.30 BLOOD TYPE AB+: ICD-10-CM

## 2020-03-25 DIAGNOSIS — Z34.83 ENCOUNTER FOR SUPERVISION OF OTHER NORMAL PREGNANCY, THIRD TRIMESTER: Primary | ICD-10-CM

## 2020-03-25 DIAGNOSIS — O26.842 UTERINE SIZE DATE DISCREPANCY PREGNANCY, SECOND TRIMESTER: ICD-10-CM

## 2020-03-25 PROCEDURE — 99441 ZZC PHYSICIAN TELEPHONE EVALUATION 5-10 MIN: CPT | Performed by: FAMILY MEDICINE

## 2020-03-25 RX ORDER — FERROUS GLUCONATE 324(38)MG
324 TABLET ORAL
Qty: 90 TABLET | Refills: 0 | Status: SHIPPED | OUTPATIENT
Start: 2020-03-25 | End: 2020-05-11

## 2020-03-25 NOTE — PROGRESS NOTES
"  SUBJECTIVE:                                                    Micki Thorpe is a 24 year old female who is being evaluated via a telephone visit secondary to COVID-19 coronavirus pandemic, as we are trying to minimize patient exposure to the virus,  which is now widespread in the state.       The patient has been notified of following:     \"This telephone visit will be conducted via a call between you and your physician/provider. We have found that certain health care needs can be provided without the need for a physical exam.  This service lets us provide the care you need with a short phone conversation.  If a prescription is necessary we can send it directly to your pharmacy.  If lab work is needed we can place an order for that and you can then stop by our lab to have the test done at a later time.    We will bill your insurance company for this service.  Please check with your medical insurance if this type of visit is covered. You may be responsible for the cost of this type of visit if insurance coverage is denied.  The typical cost is $30 (10min), $59 (11-20min) and $85 (21-30min).  Most often these visits are shorter than 10 minutes.    If during the course of the call the physician/provider feels a telephone visit is not appropriate, you will not be charged for this service.\"       Consent has been obtained for this service by care team member: yes.   See the scanned image in the medical record.    Micki Thorpe complains of  Prenatal Care  34+3/7 weeks today certain Patient's last menstrual period was 07/28/2019 (exact date).     OB  Has no concerns  No discharge  No bleeding  No leaking  No cramping or contractions - some scattered nonrhythmic.   No swelling  Feeling baby movement    BP Readings from Last 3 Encounters:   03/11/20 120/82   02/26/20 110/84   02/12/20 120/78       I have reviewed and updated the patient's Past Medical History, Social History, Family History and Medication " List.    ALLERGIES  Patient has no known allergies.      Additional provider notes: see above.     Assessment/Plan:    ICD-10-CM    1. Encounter for supervision of other normal pregnancy, third trimester  Z34.83    2. Uterine size date discrepancy pregnancy, second trimester  O26.842 ferrous gluconate (FERGON) 324 (38 Fe) MG tablet   3. Blood type AB+  Z67.30 ferrous gluconate (FERGON) 324 (38 Fe) MG tablet      Next 5 appointments (look out 90 days)    Mar 31, 2020  9:15 AM CDT  Telephone Visit with Shona Barnes PA-C  [facility] [address] [id number] [phone]   Apr 08, 2020 11:40 AM CDT - keep as an inperson visit.   ESTABL[facility] PRENATAL [facility]wini [facility] MD  [facility] [address] [place] [address]. [facility] [id number] [phone]   Apr 15, 2020 11:40 AM CDT- ok for telephone visit if uncomplicated at that time.   ESTABLISHED PRENATAL with Ashwini Garcia MD  [facility] [address] [place] [id number] [phone]   Apr 22, 2020 11:40 AM CDT- keep as in person visit   ESTABLISHED PRENATAL with Ashwini Garcia MD  [facility] [address] [place] [address]. [facility] [id number] [phone]   Apr 29, 2020 11:40 AM CDT-  keep as in person visit   ESTABLISHED PRENATAL with Ashwini Garcia MD  [facility] [address] [place] [address]. [facility] [id number] [phone]         Discussed with pt alternate schedule of prenatal visits with provider visits alternating with virtual (telephone) visits and starting on iron supplement 325mg ferrous gluconate   Discussed that if patient gets black tarry stools with the iron supplementation to start a daily fiber therapy such as  metamucil or citrucel.  there is also 18mg of elemental iron in her prenatal supplement.   Discussed that if patient gets black tarry stools with the iron supplementation to start a daily fiber therapy such as metamucil or citrucel.     went over signs of preeclampsia ( sudden swelling of hands, feet or face, sudden severe headache or right upper abdominal pain, abruption ( any bleeding or spotting) , premature labor, and fetal kick counts today.      I have reviewed the note as docferrumented above.  This accurately captures the substance of my conversation with the patient, Micki Thorpe     Total time of call between patient and provider was 6 minutes 23 seconds.         Ashwini Garcia MD    [facility]

## 2020-03-31 ENCOUNTER — MYC MEDICAL ADVICE (OUTPATIENT)
Dept: DERMATOLOGY | Facility: CLINIC | Age: 25
End: 2020-03-31

## 2020-03-31 ENCOUNTER — VIRTUAL VISIT (OUTPATIENT)
Dept: DERMATOLOGY | Facility: CLINIC | Age: 25
End: 2020-03-31
Payer: COMMERCIAL

## 2020-03-31 VITALS — BODY MASS INDEX: 30.53 KG/M2 | HEIGHT: 66 IN | WEIGHT: 190 LBS

## 2020-03-31 DIAGNOSIS — Z87.898 HISTORY OF ATYPICAL NEVUS: ICD-10-CM

## 2020-03-31 DIAGNOSIS — L90.5 SCAR TISSUE: Primary | ICD-10-CM

## 2020-03-31 PROCEDURE — 99441 ZZC PHYSICIAN TELEPHONE EVALUATION 5-10 MIN: CPT | Performed by: PHYSICIAN ASSISTANT

## 2020-03-31 ASSESSMENT — MIFFLIN-ST. JEOR: SCORE: 1623.58

## 2020-03-31 NOTE — TELEPHONE ENCOUNTER
Pictures for telephone visit on 3/31/20 @9:15am.    OSCAR Chisholm-BSN-PHN  [facility]  [phone]

## 2020-03-31 NOTE — PROGRESS NOTES
"Pt is  pregnant    Micki Thorpe is a 24 year old female who is being evaluated via a billable telephone visit.      The patient has been notified of following:     \"This telephone visit will be conducted via a call between you and your physician/provider. We have found that certain health care needs can be provided without the need for a physical exam.  This service lets us provide the care you need with a short phone conversation.  If a prescription is necessary we can send it directly to your pharmacy.  If lab work is needed we can place an order for that and you can then stop by our lab to have the test done at a later time.    If during the course of the call the physician/provider feels a telephone visit is not appropriate, you will not be charged for this service.\"     Patient has given verbal consent for Telephone visit?  Yes    Micki Thorpe complains of    Chief Complaint   Patient presents with     Skin Check     is pregnant       I have reviewed and updated the patient's Past Medical History, Social History, Family History and Medication List.    ALLERGIES  Patient has no known allergies.    Additional provider notes:  Recheck scar post mohs for a severely atypical nevus on the abdomen. Is a little itchy. She has noticed a bump within the scar. Photographs reviewed     Assessment/Plan:  1. Scar tissue  - Appears to be scar tissue but will see her in office for her FSE in 60-90 days to r/o recurrent nevus vs new nevus vs normal scar tissue    2. History of atypical nevus  She will reschedule her FSE in 60-90 days - advised on need for an annual FSE.      Phone call duration: 8 minutes    Shona Barnes MS, PA-C    "

## 2020-04-06 ENCOUNTER — PRENATAL OFFICE VISIT (OUTPATIENT)
Dept: FAMILY MEDICINE | Facility: CLINIC | Age: 25
End: 2020-04-06
Payer: COMMERCIAL

## 2020-04-06 VITALS
HEART RATE: 96 BPM | BODY MASS INDEX: 32.22 KG/M2 | WEIGHT: 199.6 LBS | TEMPERATURE: 97 F | SYSTOLIC BLOOD PRESSURE: 122 MMHG | OXYGEN SATURATION: 98 % | DIASTOLIC BLOOD PRESSURE: 86 MMHG

## 2020-04-06 DIAGNOSIS — O26.842 UTERINE SIZE DATE DISCREPANCY PREGNANCY, SECOND TRIMESTER: ICD-10-CM

## 2020-04-06 DIAGNOSIS — Z34.80 SUPERVISION OF OTHER NORMAL PREGNANCY: Primary | ICD-10-CM

## 2020-04-06 DIAGNOSIS — Z67.30 BLOOD TYPE AB+: ICD-10-CM

## 2020-04-06 LAB
ALBUMIN UR QL STRIP: NEGATIVE MG/DL
GLUCOSE UR STRIP-MCNC: NEGATIVE MG/DL

## 2020-04-06 PROCEDURE — 00000219 ZZHCL STATISTIC OBSA - URINALYSIS: Performed by: FAMILY MEDICINE

## 2020-04-06 PROCEDURE — 99207 ZZC COMPLICATED OB VISIT: CPT | Performed by: FAMILY MEDICINE

## 2020-04-06 PROCEDURE — 87653 STREP B DNA AMP PROBE: CPT | Performed by: FAMILY MEDICINE

## 2020-04-06 NOTE — PATIENT INSTRUCTIONS
Am I in Labor?  What is Labor?  Labor is the process by which your uterus contacts in a regular pattern and causes the cervix to open and prepare for delivery  What do contractions feel like?  When they first start, contractions feel like menstrual cramps.  You can feel them in your back.  At first, they will probably be 15 to 20 minutes apart.  As labor goes on, the contractions will get stronger, closer together and more painful.  What should I do when the contractions start?   -Walk.  If the pains you are having are labor pains, walking will make the contractions   come faster and harder.     -Take a shower or bath   -Eat, Labor is a big event.  It takes a lot of energy.   -Drink water  When should I go to the hospital or call my Provider?   -Your contractions have been 5 minutes apart or less for at least 1 hour   -If several contractions are so painful that you cannot walk or talk during one.   -Your bag of water breaks  Call the main office number [phone] 24 hours a day, 7 days a week and ask for the physician on call if you think you are in labor.         Labor Induction  Labor induction is a way to help get your labor started. This can protect your health and your baby s, too.   Ways to Induce Labor  Your health care provider can get your labor started by using any of three methods or a combination of them. Here are some common treatments:  Prostaglandin: A medicine that may be given as a pill, capsule, or vaginal suppository. It softens, thins, and opens the cervix. This is called cervical ripening. Your health care provider may also use a bennett catheter or a double balloon catheter. Your health care provider inserts the catheter into your cervix to mechanically dilate it and cause the release of prostaglandins.  Pitocin (oxytocin): A medicine your health care provider gives you through an IV (intravenous) line. You may get it within 4-24 hours after your health care provider gives you  prostaglandin. Pitocin helps start contractions. It s always given in the hospital.  Rupturing the membrane: A procedure in which your health care provider uses a small tool to break your bag of water. Doctors perform this procedure more often in women who have given birth before. And it s always done in the hospital.     What to Expect  Prostaglandin may be given in the hospital. Fetal monitoring is required after placement. Other methods of inducing labor are done in the hospital. You ll need to stay there until you give birth. Your health care provider may attach monitors to your belly to measure contractions and help make sure your baby has no problems. No matter how your doctor induces labor, a few factors may affect how long it takes you to give birth. These include how long it takes for your cervix to thin and open, and when contractions begin.     Give Yourself Time  Even though inducing labor gets the process started, you still may need to wait. Mothers who have labor induced most often give birth within a day or so. But it can take as long as a few days to give birth.    3151-6857 [employer]. [address] [place] [address]. All rights reserved. This information is not intended as a substitute for professional medical care. Always follow your healthcare professional's instructions.      Thank you so much or choosing [facility]  for your Health Care. It was a pleasure seeing you at your visit today! Please contact us with any questions or concerns you may have.                   Ashwini Garcia MD                              To reach your [facility] care team after hours call:   [phone]    PLEASE NOTE OUR HOURS HAVE CHANGED secondary to COVID-19 coronavirus pandemic, as we are trying to minimize patient exposure to the virus,  which is now widespread in the [facility].  These hours may change with very little notice.  We  apologize for any inconvenience.       Our current clinic hours are:    Monday- Friday  9:00am - 4:00pm                              Saturday and Sunday : Closed to in person visits      We have telephone and virtual visit times available between 7:40am - 6pm on Mondays.                                                      And 7:40am - 5pm Tuesdays through Fridays.                  Phone:  [phone]    Our pharmacy hours:   Monday  9:00 am to 6:00 pm      Tuesday through Friday 9:00am to 5:00pm                        Saturday - 9:00 am to 12 noon       Sunday : Closed.              Phone:  [phone]      There is also information available at our web site:  [email or web address]    If your provider ordered any lab tests and you do not receive the results within 10 business days, please call the clinic.    If you need a medication refill please contact your pharmacy.  Please allow 2 business days for your refill to be completed.    Our clinic offers telephone visits and e visits.  Please ask one of your team members to explain more.      Use [employer] (secure email communication and access to your chart) to send your primary care provider a message or make an appointment. Ask someone on your Team how to sign up for [employer].

## 2020-04-06 NOTE — PROGRESS NOTES
SUBJECTIVE:                                                    Micki Thorpe is here today for a Prenatal Visit at 36w1d gestation. In person visit.     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  Got Tdap booster on 3/11/2020.      O: See OB Vitals    Patient Active Problem List   Diagnosis     Blood type AB+ - no need for rhogam     Encounter for supervision of other normal pregnancy, third trimester     CARDIOVASCULAR SCREENING; LDL GOAL LESS THAN 160     Unplanned wanted pregnancy - x 2- both on ocp's - 2nd on micronor      Cervical high risk HPV (human papillomavirus) test positive     Anxiety     Irritability and anger     Dysplastic nevus of trunk - Compound dysplastic nevus with moderate to severe atypia s/p MOHS procedure - Dr. Clay      Benign paroxysmal positional vertigo of right ear     Strain of neck muscle, initial encounter     Uterine size date discrepancy pregnancy, second trimester- measuring 28cm at 22.5wks EGA        Current Outpatient Medications   Medication Sig Dispense Refill     ferrous gluconate (FERGON) 324 (38 Fe) MG tablet Take 1 tablet (324 mg) by mouth daily (with breakfast) 90 tablet 0     fluocinonide (LIDEX) 0.05 % external solution Apply to scalp BID x 2-4 weeks. Do not use on face. Taper with improvement 60 mL 3     order for DME Equipment being ordered: breast pump and all necessary accessories including tubing, valves, connectors etc. 1 Device 0     Prenatal Vit-Fe Fumarate-FA (PRENATAL VITAMIN) 27-0.8 MG TABS Take 1 tablet by mouth daily          No Known Allergies       Problem list and histories reviewed & adjusted, as indicated.  Additional history: as documented    Reviewed and updated as needed this visit by clinical staff  Allergies  Meds       Reviewed and updated as needed this visit  by Provider         ROS:   ROS: 12 point ROS neg other than the symptoms noted above    OBJECTIVE:                                                    /86   Pulse 96   Temp 97  F (36.1  C)   Wt 90.5 kg (199 lb 9.6 oz)   LMP 07/28/2019 (Exact Date)   SpO2 98%   BMI 32.22 kg/m    Body mass index is 32.22 kg/m .   GENERAL: healthy, alert, well nourished, well hydrated, no distress    Diagnostic test results:  Diagnostic Test Results:  Labs reviewed in Epic  Results for orders placed or performed in visit on 04/06/20 (from the past 24 hour(s))   Glucose and albumin, OB urine   Result Value Ref Range    Protein Albumin Urine Negative mg/dL    Glucose Urine Negative mg/dL        ASSESSMENT/PLAN:                                                        ICD-10-CM    1. Supervision of other normal pregnancy  Z34.80 Glucose and albumin, OB urine     Group B strep PCR   2. Uterine size date discrepancy pregnancy, second trimester  O26.842    3. Blood type AB+  Z67.30      Discussed plan to induce at 39 weeks secondary to COVID-19 coronavirus pandemic, as we are trying to minimize patient exposure to the virus,  which is now widespread in the state.       Pt will see Dr. Larios next week with telephone visit at 37 weeks as I'm on furlough.   Will see me back the week following at 38 weeks. went over signs of preeclampsia ( sudden swelling of hands, feet or face, sudden severe headache or right upper abdominal pain, abruption ( any bleeding or spotting) , premature labor, leaking fluid, and fetal kick counts today.  EFW= 2800g by [facility] today.     See Patient Instructions.          Ashwini Garcia MD    [facility]

## 2020-04-07 LAB
GP B STREP DNA SPEC QL NAA+PROBE: NEGATIVE
SPECIMEN SOURCE: NORMAL

## 2020-04-14 ENCOUNTER — VIRTUAL VISIT (OUTPATIENT)
Dept: FAMILY MEDICINE | Facility: CLINIC | Age: 25
End: 2020-04-14
Payer: COMMERCIAL

## 2020-04-14 DIAGNOSIS — Z34.83 ENCOUNTER FOR SUPERVISION OF OTHER NORMAL PREGNANCY, THIRD TRIMESTER: Primary | ICD-10-CM

## 2020-04-14 PROCEDURE — 99207 ZZC PRENATAL VISIT: CPT | Mod: 95 | Performed by: FAMILY MEDICINE

## 2020-04-14 NOTE — PROGRESS NOTES
"Subjective   Micki Thorpe is a 24 year old female who is being evaluated via a billable telephone visit.      The patient has been notified of following:     \"This telephone visit will be conducted via a call between you and your physician/provider. We have found that certain health care needs can be provided without the need for a physical exam.  This service lets us provide the care you need with a short phone conversation.  If a prescription is necessary we can send it directly to your pharmacy.  If lab work is needed we can place an order for that and you can then stop by our lab to have the test done at a later time.    Telephone visits are billed at different rates depending on your insurance coverage. During this emergency period, for some insurers they may be billed the same as an in-person visit.  Please reach out to your insurance provider with any questions.    If during the course of the call the physician/provider feels a telephone visit is not appropriate, you will not be charged for this service.\"     Patient has given verbal consent for Telephone visit?  Yes    How would you like to obtain your AVS? [employer]    Telephone visit Start Time: 11:39 AM    Chief Complaint  Patient presents with:  Prenatal Care     S:Micki Thorpe is called today for a Prenatal Visit at 37w2d gestation.  Concerns today:  none  Bleeding:  No  Cramping/Contractions:No - better this week.  Leaking of fluid: No  Baby moving:  Yes  Edema : :No    ROS: Constitutional, HEENT, cardiovascular, pulmonary, GI, , musculoskeletal, neuro, skin, endocrine and psych systems are negative, except as otherwise noted.     Objective   Reported vitals:  LMP 07/28/2019 (Exact Date)    Gen: healthy, alert, and no distress  Psych: Alert and oriented times 3; coherent speech, normal   rate and volume, able to articulate logical thoughts, able   to abstract reason, no tangential thoughts, no hallucinations   or delusions.  Her affect is " normal.    Diagnostic Test Results:  Labs reviewed in Epic      ASSESSMENT:/PLAN:  Labor warnings and kick counts discussed. Follow up in 1 week as planned.    Call the clinic number if any concerns or symptoms.    Telephone visit Stop Time: 11:42 AM  Phone call duration:  5 minutes  LOS prenatal visit        Reed Larios MD     [address] [address]  [email or web address]  [email or web address]   Office: [phone]  Pager: [phone]

## 2020-04-22 ENCOUNTER — PRENATAL OFFICE VISIT (OUTPATIENT)
Dept: FAMILY MEDICINE | Facility: CLINIC | Age: 25
End: 2020-04-22
Payer: COMMERCIAL

## 2020-04-22 VITALS
HEIGHT: 66 IN | OXYGEN SATURATION: 98 % | SYSTOLIC BLOOD PRESSURE: 126 MMHG | BODY MASS INDEX: 32.14 KG/M2 | TEMPERATURE: 97.6 F | HEART RATE: 118 BPM | WEIGHT: 200 LBS | DIASTOLIC BLOOD PRESSURE: 76 MMHG

## 2020-04-22 DIAGNOSIS — O26.842 UTERINE SIZE DATE DISCREPANCY PREGNANCY, SECOND TRIMESTER: ICD-10-CM

## 2020-04-22 DIAGNOSIS — Z67.30 BLOOD TYPE AB+: ICD-10-CM

## 2020-04-22 DIAGNOSIS — Z34.83 ENCOUNTER FOR SUPERVISION OF OTHER NORMAL PREGNANCY, THIRD TRIMESTER: Primary | ICD-10-CM

## 2020-04-22 PROCEDURE — 99207 ZZC PRENATAL VISIT: CPT | Performed by: FAMILY MEDICINE

## 2020-04-22 ASSESSMENT — MIFFLIN-ST. JEOR: SCORE: 1668.94

## 2020-04-22 NOTE — PATIENT INSTRUCTIONS
Labor Induction  Labor induction is a way to help get your labor started. This can protect your health and your baby s, too.   Ways to Induce Labor  Your doctor can get your labor started by using any of three methods or a combination of them. Here are some common treatments:  Prostaglandin: A medicine that s placed in your vagina. It may be given as a time-release stick. It softens, thins, and opens the cervix. This is called cervical ripening.   Pitocin: A medicine that s given through an IV (intravenous) line. You may get it within 4-24 hours of being given prostaglandin. Pitocin helps start contractions. It s always given in the hospital.  Rupturing the membrane: A procedure that uses a small tool to break your bag of water. It s done more often in women who have given birth before. And it s always done in the hospital.    Reasons for Inducing Labor:   Being past due date  Pre-eclampsia ( High Blood pressure with related health problems)   Certain disesases such as diabetes  Low amniotic fluid  A ruptured bag of water or other risk factor that can affect the baby's health      What to Expect  Prostaglandin may be given in your doctor s office. You may then be sent home to wait for your cervix to ripen. Other methods of inducing labor are done in the hospital. You ll need to stay there until you give birth. Monitors may be attached to your belly to measure contractions and help make sure your baby has no problems. No matter how labor is induced, a few factors may affect how long it takes you to give birth. These include how long it takes for your cervix to thin and open and when contractions begin.    With labor induction, you may have a greater chance of :   A  Section ( surgical delivery)  An infection  A Longer hospital stay      Give Yourself Time  Even though inducing labor gets the process started, you still may need to wait. Mothers who have labor induced most often give birth within  a day or so. But it can take as long as a few days to give birth.  If You re Sent Home  In some cases of cervical ripening, you may be sent home for a while. But be ready to go to the hospital as soon as you feel regular and strong contractions. You should go to the hospital right away if your bag of water breaks or if you can t feel your baby move.    [id number] [facility], [address] [place] [address], [place] [address]. All rights reserved. This information is not intended as a substitute for professional medical care. Always follow your healthcare professional's instructions  Patient Education     Dinoprostone Vaginal gel     Dinoprostone Vaginal insert     Dinoprostone Vaginal suppository   Dinoprostone Vaginal suppository   What is this medicine?  DINOPROSTONE, PROSTAGLANDIN E2 (dye gerardo PROST one  pros tuh GLAN din) stimulates contractions to empty the uterus. Sometimes it is used after a miscarriage. It can be used when necessary to induce  between the 12th and 20th week of pregnancy. This medicine may also be used to treat a condition called benign hydatidiform mole.     This medicine may be used for other purposes; ask your health care provider or pharmacist if you have questions.  What should I tell my health care provider before I take this medicine?  They need to know if you have any of the following conditions:    heart disease    kidney disease    liver disease    lung or breathing disease, like asthma    vaginal inflammation or infection    an unusual or allergic reaction to dinoprostone, prostaglandins, other medicines, foods, dyes, or preservatives    breast-feeding  How should I use this medicine?  This medicine is inserted into the vagina by a health-care professional in a hospital or clinic setting. Remain lying down for 10 minutes after it is inserted.  Talk to your pediatrician regarding the use of this medicine in children. Special care may be needed.  Overdosage: If you think you have  taken too much of this medicine contact a poison control center or emergency room at once.  NOTE: This medicine is only for you. Do not share this medicine with others.  What if I miss a dose?  This does not apply.  What may interact with this medicine?    oxytocin  This list may not describe all possible interactions. Give your health care provider a list of all the medicines, herbs, non-prescription drugs, or dietary supplements you use. Also tell them if you smoke, drink alcohol, or use illegal drugs. Some items may interact with your medicine.  What should I watch for while using this medicine?  You will be closely monitored while you receive this medicine.  This medicine can affect other muscles outside of the uterus and cause side effects. Most of these effects will go away quickly.  Contact your doctor or health care professional immediately if you get an unpleasant vaginal discharge, continued fever, chills and shivering, or increase in vaginal bleeding several days after treatment.  What side effects may I notice from receiving this medicine?  Side effects that you should report to your doctor or health care professional as soon as possible:    allergic reactions like skin rash, itching or hives, swelling of the face, lips, or tongue    chest pain or palpitations    continuous or excessive vaginal bleeding    dizziness or fainting    fever    foul smelling vaginal discharge    abdominal pain, or severe cramping pains  Side effects that usually do not require medical attention (report to your doctor or health care professional if they continue or are bothersome):    diarrhea    chills or shivering    hot flashes    headache    nausea, vomiting  This list may not describe all possible side effects. Call your doctor for medical advice about side effects. You may report side effects to FDA at 4-162-KIA-8017.  Where should I keep my medicine?  This does not apply. You will not be given this medicine to store at  home.  NOTE:This sheet is a summary. It may not cover all possible information. If you have questions about this medicine, talk to your doctor, pharmacist, or health care provider. Copyright  2011 [employer]  Back  SP    Oxytocin Solution for injection  What is this medicine?  OXYTOCIN (ox i TOE sin) is a man-made form of a natural hormone. It works by causing the uterus to contract. It is used to increase the strength of contractions of the uterus. It can be used during childbirth to speed delivery or after childbirth to control bleeding. It is also used clear the uterus after an incomplete  or miscarriage.  This medicine may be used for other purposes; ask your health care provider or pharmacist if you have questions.  What should I tell my health care provider before I take this medicine?  They need to know if you have any of these conditions:    any condition where vaginal childbirth is unwanted like cervical cancer, herpes infection, oversized fetal head    dangerous position of the fetus, placenta, or umbilical cord    history of uterine surgery like  section    pregnant many times before    uterus over stimulated    an unusual or allergic reaction to oxytocin, other medicines, foods, dyes, or preservatives    pregnant or trying to get pregnant    breast-feeding  How should I use this medicine?  This medicine is for infusion into a vein. It is given by a health care professional in a hospital or clinic setting.  Talk to your pediatrician regarding the use of this medicine in children. Special care may be needed.  Overdosage: If you think you have taken too much of this medicine contact a poison control center or emergency room at once.  NOTE: This medicine is only for you. Do not share this medicine with others.  What if I miss a dose?  This does not apply.  What may interact with this medicine?  Do not take this medicine with any of the following medications:    Alexandrea Felix  medicine may also interact with the following medications:    dinoprostone, prostaglandin E2    medicines for blood pressure    medicines used for sleep during surgery    other medicines to contract the uterus  This list may not describe all possible interactions. Give your health care provider a list of all the medicines, herbs, non-prescription drugs, or dietary supplements you use. Also tell them if you smoke, drink alcohol, or use illegal drugs. Some items may interact with your medicine.  What should I watch for while using this medicine?  Your condition will be monitored carefully while you are receiving this medicine.  What side effects may I notice from receiving this medicine?  Side effects that you should report to your doctor or health care professional as soon as possible:    allergic reactions like skin rash, itching or hives, swelling of the face, lips, or tongue    breathing problems    excessive or continuing vaginal bleeding    fast, irregular heartbeat    feeling faint or lightheaded, falls    high blood pressure    seizures    unusual bleeding or bruising    unusual swelling, sudden weight gain  Side effects that usually do not require medical attention (report to your doctor or health care professional if they continue or are bothersome):    headache    nausea and vomiting  This list may not describe all possible side effects. Call your doctor for medical advice about side effects. You may report side effects to FDA at 1-863-FDA-0604.  Where should I keep my medicine?  This drug is given in a hospital or clinic and will not be stored at home.  NOTE:This sheet is a summary. It may not cover all possible information. If you have questions about this medicine, talk to your doctor, pharmacist, or health care provider. Copyright  2011 [employer]  Back                   Am I in Labor?  What is Labor?  Labor is the process by which your uterus contacts in a regular pattern and causes the cervix  to open and prepare for delivery  What do contractions feel like?  When they first start, contractions feel like menstrual cramps.  You can feel them in your back.  At first, they will probably be 15 to 20 minutes apart.  As labor goes on, the contractions will get stronger, closer together and more painful.  What should I do when the contractions start?   -Walk.  If the pains you are having are labor pains, walking will make the contractions   come faster and harder.     -Take a shower or bath   -Eat, Labor is a big event.  It takes a lot of energy.   -Drink water  When should I go to the hospital or call my Provider?   -Your contractions have been 5 minutes apart or less for at least 1 hour   -If several contractions are so painful that you cannot walk or talk during one.   -Your bag of water breaks  Call the main office number [phone] 24 hours a day, 7 days a week and ask for the physician on call if you think you are in labor.      Thank you so much or choosing [facility]  for your Health Care. It was a pleasure seeing you at your visit today! Please contact us with any questions or concerns you may have.                   Ashwini Garcia MD                              To reach your [facility] care team after hours call:   [phone]    PLEASE NOTE OUR HOURS HAVE CHANGED secondary to COVID-19 coronavirus pandemic, as we are trying to minimize patient exposure to the virus,  which is now widespread in the [facility].  These hours may change with very little notice.  We apologize for any inconvenience.       Our current clinic hours are:    Monday- Friday  9:00am - 4:00pm                              Saturday and Sunday : Closed to in person visits      We have telephone and virtual visit times available between 7:40am - 6pm on Mondays.                                                      And 7:40am - 5pm Tuesdays through Fridays.                   Phone:  [phone]    Our pharmacy hours:   Monday  9:00 am to 6:00 pm      Tuesday through Friday 9:00am to 5:00pm                        Saturday - 9:00 am to 12 noon       Sunday : Closed.              Phone:  [phone]      There is also information available at our web site:  [email or web address]    If your provider ordered any lab tests and you do not receive the results within 10 business days, please call the clinic.    If you need a medication refill please contact your pharmacy.  Please allow 2 business days for your refill to be completed.    Our clinic offers telephone visits and e visits.  Please ask one of your team members to explain more.      Use MyChart (secure email communication and access to your chart) to send your primary care provider a message or make an appointment. Ask someone on your Team how to sign up for [employer].

## 2020-04-22 NOTE — PROGRESS NOTES
"S:Micki Thorpe is here today for a Prenatal Visit at 38w3d gestation.  Concerns today:  Tired and ready  Bleeding:  No  Cramping/Contractions:Yes [place] stubbs  Leaking of fluid: No  Baby moving:  Yes     O: See OB Vitals  /76   Pulse 118   Temp 97.6  F (36.4  C) (Tympanic)   Ht 1.676 m (5' 6\")   Wt 90.7 kg (200 lb)   LMP 07/28/2019 (Exact Date)   SpO2 98%   BMI 32.28 kg/m      ASSESSMENT:/PLAN:    ICD-10-CM    1. Encounter for supervision of other normal pregnancy, third trimester  Z34.83 Glucose and albumin, OB urine   2. Uterine size date discrepancy pregnancy, second trimester  O26.842    3. Blood type AB+  Z67.30      Banks's Scoring:   Number assessment:   0  1  2  3  Dilation of cervix:     0  1-2  3-4  5-6+   Effacement of cervix:    0-30% 40-50% 60-70% 80%+   Consistency :  Firm  Average Soft  ----  Position:  Posterior Mid  Anterior ----  Station:   -3  -2  -1/0  +1      went over signs of preeclampsia ( sudden swelling of hands, feet or face, sudden severe headache or right upper abdominal pain, abruption ( any bleeding or spotting) , premature labor, and fetal kick counts today.   Pt would like to hold on 39+0/7 wk induction or cervical ripening at this point due to COVID-19 novel coronavirus pandemic. She'd like to see if she ripens more naturally in the next several days to a week.  Will keep her appt with me for 1 week from today = 39+ 3/7 weeks EGA.  She'd like to think about induction for 39+ 5/7 weeks EGA on Friday 5/1/2020.    Return in 1  weeks    See pt instructions.          Ashwini Garcia MD        "

## 2020-04-29 ENCOUNTER — PRENATAL OFFICE VISIT (OUTPATIENT)
Dept: FAMILY MEDICINE | Facility: CLINIC | Age: 25
End: 2020-04-29
Payer: COMMERCIAL

## 2020-04-29 VITALS
SYSTOLIC BLOOD PRESSURE: 130 MMHG | OXYGEN SATURATION: 97 % | WEIGHT: 204 LBS | BODY MASS INDEX: 32.93 KG/M2 | DIASTOLIC BLOOD PRESSURE: 80 MMHG | HEART RATE: 119 BPM

## 2020-04-29 DIAGNOSIS — Z67.30 BLOOD TYPE AB+: ICD-10-CM

## 2020-04-29 DIAGNOSIS — O26.842 UTERINE SIZE DATE DISCREPANCY PREGNANCY, SECOND TRIMESTER: ICD-10-CM

## 2020-04-29 DIAGNOSIS — Z34.83 ENCOUNTER FOR SUPERVISION OF OTHER NORMAL PREGNANCY, THIRD TRIMESTER: Primary | ICD-10-CM

## 2020-04-29 LAB
ALBUMIN UR QL STRIP: NEGATIVE MG/DL
GLUCOSE UR STRIP-MCNC: NEGATIVE MG/DL

## 2020-04-29 PROCEDURE — 00000219 ZZHCL STATISTIC OBSA - URINALYSIS: Performed by: FAMILY MEDICINE

## 2020-04-29 PROCEDURE — 99207 ZZC COMPLICATED OB VISIT: CPT | Performed by: FAMILY MEDICINE

## 2020-04-29 NOTE — LETTER
[facility]  [address] [place] [address]  Office: [phone]   Fax:    [phone]     INDUCTION ORDERS/NOTE    DATE OF INDUCTION: 2020  Pt will present for cervical ripening 2020 at 7:30pm.   Pt will Call Labor and Delivery at 6:30pm on 2020 - phone number:  [phone]  - for 7:30pm arrival time for cervical ripening with cervidil.       L&D FAX: [phone]    Patient name: Micki Thorpe   Patient's YOB: 1995  MRN: [id number]      OB History    Para Term  AB Living   3 2 2 0 0 2   SAB TAB Ectopic Multiple Live Births   0 0 0 0 2      # Outcome Date GA Lbr Rufino/2nd Weight Sex Delivery Anes PTL Lv   3 Current            2 Term 16 40w5d 04:13 / 00:33 3.32 kg (7 lb 5.1 oz) M Vag-Spont EPI, IV REGIONAL  JEFFERY      Birth Comments: none - long umbilical cord - had CAN x 1 reduced prior to delivery of fetal shoulders and loose cord around left leg x 2       Name: Jefry Holman       Apgar1: 8  Apgar5: 9   1 Term 14 40w3d 03:14 / 01:42 3.175 kg (7 lb) F Vag-Spont EPI, IV REGIONAL  JEFFERY      Birth Comments: none       Name: Lotus Holman       Apgar1: 9  Apgar5: 9       EDC: Estimated Date of Delivery: May 3, 2020 by Patient's last menstrual period was 2019 (exact date). and/or first trimester ultrasound    Date Scheduled:  2020  Scheduled by: Ashwini Garcia MD with OSCAR Sanz on L&D.   Micki Thorpe  : 1995  Page 2    Induction Diagnoses: elective induction of Labor at term due to COVID-19 novel coronavirus pandemic          Chief Complaint:   Micki Thorpe is a 24 year old female who is 39w3d pregnant and to be admitted on 2020  for cervical ripening, then induction of labor, indication : elective induction of Labor at term due to COVID-19 novel coronavirus pandemic     Prenatal Labs:   Lab Results   Component Value Date    ABO AB 10/03/2019    RH Pos 10/03/2019    AS Neg 10/03/2019     HEPBANG Nonreactive 10/03/2019    CHPCRT Negative 10/03/2019    GCPCRT Negative 10/03/2019    TREPAB Negative 2016    RUBELLAABIGG 13 2013    HGB 11.2 (L) 2020     GBS Status:   Lab Results   Component Value Date    GBS Negative 2020     Documentation of one or more of the following to confirm fetal lung maturity: Ultrasound measurement of the crown rump length obtained at 6-12 weeks support a gestational age of at least 39 weeks.  Cervix:   Membranes: intact   Dilation: 1   Effacement: 0%   Station:-3   Consistency: soft   Position: Mid  Presentation:Cephalic    Banks's Scoring (score is bold): 4  Number assessment:   0  1  2  3  Dilation of cervix:     0  1-2  3-4  5-6+   Effacement of cervix:    0-30% 40-50% 60-70% 80%+   Consistency :  Firm  Average Soft  ----  Position:  Posterior Mid  Anterior ----  Station:   -3  -2  -1/0  +1    Complete Banks's Score: 4    Micki [place]  : 1995  Page 3     EFW within the last week by clinical exam or ultrasound: 3400 gms    Induction/cervical ripening plan:   Dinoprostone Insert (Cervadil)10mg intravaginally per protocol    Risks/benefits and alternatives have been discussed with patient in detail and pt voices understanding.   Written information given to patient.             Ashwini Garcia MD  Pager [phone]  Cell: [phone]    20  TIME: 5:42pm.

## 2020-04-29 NOTE — LETTER
[facility]  [address] [place] [address]  Office: [phone]   Fax:    [phone]     INDUCTION ORDERS/NOTE    DATE OF INDUCTION: 2020  Pt will present for cervical ripening 2020 at 7:30pm.   Pt will Call Labor and Delivery at 6:30pm on 2020 - phone number:  [phone]  - for 7:30pm arrival time for cervical ripening with cervidil.       L&D FAX: [phone]    Patient name: Micki Thorpe   Patient's YOB: 1995  MRN: [id number]      OB History    Para Term  AB Living   3 2 2 0 0 2   SAB TAB Ectopic Multiple Live Births   0 0 0 0 2      # Outcome Date GA Lbr Rufino/2nd Weight Sex Delivery Anes PTL Lv   3 Current            2 Term 16 40w5d 04:13 / 00:33 3.32 kg (7 lb 5.1 oz) M Vag-Spont EPI, IV REGIONAL  JEFFERY      Birth Comments: none - long umbilical cord - had CAN x 1 reduced prior to delivery of fetal shoulders and loose cord around left leg x 2       Name: Jefry Holman       Apgar1: 8  Apgar5: 9   1 Term 14 40w3d 03:14 / 01:42 3.175 kg (7 lb) F Vag-Spont EPI, IV REGIONAL  JEFFERY      Birth Comments: none       Name: Lotus Holman       Apgar1: 9  Apgar5: 9       EDC: Estimated Date of Delivery: May 3, 2020 by Patient's last menstrual period was 2019 (exact date). and/or first trimester ultrasound    Date Scheduled:  2020  Scheduled by: Ashwini Garcia MD with OSCAR Sanz on L&D.   Micki Thorpe  : 1995  Page 2    Induction Diagnoses: elective induction of Labor at term due to COVID-19 novel coronavirus pandemic          Chief Complaint:   Micki Thorpe is a 24 year old female who is 39w3d pregnant and to be admitted on 2020  for cervical ripening, then induction of labor, indication : elective induction of Labor at term due to COVID-19 novel coronavirus pandemic     Prenatal Labs:   Lab Results   Component Value Date    ABO AB 10/03/2019    RH Pos 10/03/2019    AS Neg 10/03/2019     HEPBANG Nonreactive 10/03/2019    CHPCRT Negative 10/03/2019    GCPCRT Negative 10/03/2019    TREPAB Negative 2016    RUBELLAABIGG 13 2013    HGB 11.2 (L) 2020     GBS Status:   Lab Results   Component Value Date    GBS Negative 2020     Documentation of one or more of the following to confirm fetal lung maturity: Ultrasound measurement of the crown rump length obtained at 6-12 weeks support a gestational age of at least 39 weeks.  Cervix:   Membranes: intact   Dilation: 1   Effacement: 0%   Station:-3   Consistency: soft   Position: Mid  Presentation:Cephalic    Banks's Scoring (score is bold): 4  Number assessment:   0  1  2  3  Dilation of cervix:     0  1-2  3-4  5-6+   Effacement of cervix:    0-30% 40-50% 60-70% 80%+   Consistency :  Firm  Average Soft  ----  Position:  Posterior Mid  Anterior ----  Station:   -3  -2  -1/0  +1    Complete Banks's Score: 4    Micki PURI [place]  : 1995  Page 3     EFW within the last week by clinical exam or ultrasound: 3400 gms    Induction/cervical ripening plan:   Dinoprostone Insert (Cervadil)10mg intravaginally per protocol    Risks/benefits and alternatives have been discussed with patient in detail and pt voices understanding.   Written information given to patient.             Ashwini Garcia MD  Pager [phone]  Cell: [phone]    20  TIME: 5:42pm.

## 2020-04-29 NOTE — PATIENT INSTRUCTIONS
Call Labor and Delivery at 6:30pm on 5/1/2020 - phone number:  [phone]  - for 7:30pm arrival time for cervical ripening with cervidil. You will be staying overnight.   Plan for pitocin induction of labor the following morning.      Thank you so much or choosing [facility]  for your Health Care. It was a pleasure seeing you at your visit today! Please contact us with any questions or concerns you may have.                   Ashwini Garcia MD                              To reach your [facility] care team after hours call:   [phone]    PLEASE NOTE OUR HOURS HAVE CHANGED secondary to COVID-19 coronavirus pandemic, as we are trying to minimize patient exposure to the virus,  which is now widespread in the [facility].  These hours may change with very little notice.  We apologize for any inconvenience.       Our current clinic hours are:    Monday- Friday  9:00am - 4:00pm                              Saturday and Sunday : Closed to in person visits      We have telephone and virtual visit times available between 7:40am - 6pm on Mondays.                                                      And 7:40am - 5pm Tuesdays through Fridays.                  Phone:  [phone]    Our pharmacy hours:   Monday  9:00 am to 6:00 pm      Tuesday through Friday 9:00am to 5:00pm                        Saturday - 9:00 am to 12 noon       Sunday : Closed.              Phone:  [phone]      There is also information available at our web site:  [email or web address]    If your provider ordered any lab tests and you do not receive the results within 10 business days, please call the clinic.    If you need a medication refill please contact your pharmacy.  Please allow 2 business days for your refill to be completed.    Our clinic offers telephone visits and e visits.  Please ask one of your team members to explain more.      Use [employer] (secure email communication and  access to your chart) to send your primary care provider a message or make an appointment. Ask someone on your Team how to sign up for [employer].

## 2020-04-29 NOTE — PROGRESS NOTES
SUBJECTIVE:                                                    Micki Thorpe is here today for a Prenatal Visit at 39w3d gestation.    Concerns today:  none  Bleeding:  No  Cramping/Contractions:No  Leaking of fluid: No  Edema:  No  Baby moving:  Yes  O: See OB Vitals      Problem list and histories reviewed & adjusted, as indicated.  Additional history: as documented    Reviewed and updated as needed this visit by clinical staff       Reviewed and updated as needed this visit by Provider         ROS:   ROS: 12 point ROS neg other than the symptoms noted above    OBJECTIVE:                                                    BP (!) 122/90   Pulse 119   Wt 92.5 kg (204 lb)   LMP 2019 (Exact Date)   SpO2 97%   BMI 32.93 kg/m    Body mass index is 32.93 kg/m .   GENERAL: healthy, alert, well nourished, well hydrated, no distress  HENT: ear canals- normal; TMs- normal; Nose- normal; Mouth- no ulcers, no lesions  NECK: no tenderness, no adenopathy, no asymmetry, no masses, no stiffness; thyroid- normal to palpation  RESP: lungs clear to auscultation - no rales, no rhonchi, no wheezes  CV: regular rates and rhythm, normal S1 S2, no S3 or S4 and no murmur, no click or rub -  ABDOMEN: soft, gravid,  no tenderness, no  hepatosplenomegaly, no masses, normal bowel sounds    Diagnostic test results:  Diagnostic Test Results:  Labs reviewed in Epic  Results for orders placed or performed in visit on 20 (from the past 24 hour(s))   Glucose and albumin, OB urine   Result Value Ref Range    Protein Albumin Urine Negative mg/dL    Glucose Urine Negative mg/dL        ASSESSMENT/PLAN:                                                        ICD-10-CM    1. Encounter for supervision of other normal pregnancy, third trimester  Z34.83 Glucose and albumin, OB urine   2. Uterine size date discrepancy pregnancy, second trimester  O26.842    3. Blood type AB+  Z67.30       EFW today by [facility]: 3400g   Banks's Scorin  today   Number assessment:   0  1  2  3  Dilation of cervix:     0  1-2  3-4  5-6+   Effacement of cervix:    0-30% 40-50% 60-70% 80%+   Consistency :  Firm  Average Soft  ----  Position:  Posterior Mid  Anterior ----  Station:   -3  -2  -1/0  +1    Induction scheduled with [facility] Labor and Delivery Charge.     See Patient Instructions  Plan to present to L&D Friday Evening for cervical ripening and induce with pitocin   Pt will Call Labor and Delivery at 6:30pm on 5/1/2020 - phone number:  [phone]  - for 7:30pm arrival time for cervical ripening with cervidil. You will be staying overnight.   Plan for pitocin induction of labor the following morning.      Discussed new L&D visitors policy secondary to the COVID-19 coronavirus pandemic =  limited to 1 labor partner only. That person must accompany the mother to admitting/hospital and cannot leave the hospital.  Mother and visitor will be screening for COVID-19 symptoms ( cough or fever or severe body aches or shortness of breath).  If they leave for any reason, they will not be allowed back into the hospital. Period. No exceptions.   Also discussed with pt that she and partner will be masked upon arrival and will need to keep their masks on as directed by nursing staff. She is also aware that she will be tested for COVID-19 novel coronavirus on arrival as well , for her protection as well as the protection of hospital staff.    Patient voiced understanding of the new policies above.             Ashwini Garcia MD    [facility]

## 2020-05-01 ENCOUNTER — ANESTHESIA (OUTPATIENT)
Dept: OBGYN | Facility: CLINIC | Age: 25
End: 2020-05-01
Payer: COMMERCIAL

## 2020-05-01 ENCOUNTER — ANESTHESIA EVENT (OUTPATIENT)
Dept: OBGYN | Facility: CLINIC | Age: 25
End: 2020-05-01
Payer: COMMERCIAL

## 2020-05-01 ENCOUNTER — HOSPITAL ENCOUNTER (INPATIENT)
Facility: CLINIC | Age: 25
LOS: 2 days | Discharge: HOME | End: 2020-05-03
Attending: FAMILY MEDICINE | Admitting: FAMILY MEDICINE
Payer: COMMERCIAL

## 2020-05-01 DIAGNOSIS — Z91.89 AT RISK FOR INEFFECTIVE BREASTFEEDING: ICD-10-CM

## 2020-05-01 LAB
ABO + RH BLD: NORMAL
ABO + RH BLD: NORMAL
HGB BLD-MCNC: 10.2 G/DL (ref 11.7–15.7)
SARS-COV-2 RNA SPEC QL NAA+PROBE: NORMAL
SPECIMEN EXP DATE BLD: NORMAL
SPECIMEN SOURCE: NORMAL

## 2020-05-01 PROCEDURE — 86900 BLOOD TYPING SEROLOGIC ABO: CPT | Performed by: FAMILY MEDICINE

## 2020-05-01 PROCEDURE — 40000671 ZZH STATISTIC ANESTHESIA CASE

## 2020-05-01 PROCEDURE — 25000132 ZZH RX MED GY IP 250 OP 250 PS 637: Performed by: FAMILY MEDICINE

## 2020-05-01 PROCEDURE — 86780 TREPONEMA PALLIDUM: CPT | Performed by: FAMILY MEDICINE

## 2020-05-01 PROCEDURE — 87635 SARS-COV-2 COVID-19 AMP PRB: CPT | Performed by: FAMILY MEDICINE

## 2020-05-01 PROCEDURE — 85018 HEMOGLOBIN: CPT | Performed by: FAMILY MEDICINE

## 2020-05-01 PROCEDURE — 37000011 ZZH ANESTHESIA WARD SERVICE

## 2020-05-01 PROCEDURE — 86901 BLOOD TYPING SEROLOGIC RH(D): CPT | Performed by: FAMILY MEDICINE

## 2020-05-01 PROCEDURE — 12000000 ZZH R&B MED SURG/OB

## 2020-05-01 PROCEDURE — 3E0P7VZ INTRODUCTION OF HORMONE INTO FEMALE REPRODUCTIVE, VIA NATURAL OR ARTIFICIAL OPENING: ICD-10-PCS | Performed by: FAMILY MEDICINE

## 2020-05-01 RX ORDER — ACETAMINOPHEN 325 MG/1
650 TABLET ORAL EVERY 4 HOURS PRN
Status: DISCONTINUED | OUTPATIENT
Start: 2020-05-01 | End: 2020-05-02

## 2020-05-01 RX ORDER — NALBUPHINE HYDROCHLORIDE 20 MG/ML
2.5-5 INJECTION, SOLUTION INTRAMUSCULAR; INTRAVENOUS; SUBCUTANEOUS EVERY 6 HOURS PRN
Status: DISCONTINUED | OUTPATIENT
Start: 2020-05-01 | End: 2020-05-02

## 2020-05-01 RX ORDER — METHYLERGONOVINE MALEATE 0.2 MG/ML
200 INJECTION INTRAVENOUS
Status: DISCONTINUED | OUTPATIENT
Start: 2020-05-01 | End: 2020-05-02

## 2020-05-01 RX ORDER — OXYTOCIN/0.9 % SODIUM CHLORIDE 30/500 ML
100-340 PLASTIC BAG, INJECTION (ML) INTRAVENOUS CONTINUOUS PRN
Status: COMPLETED | OUTPATIENT
Start: 2020-05-01 | End: 2020-05-02

## 2020-05-01 RX ORDER — IBUPROFEN 800 MG/1
800 TABLET, FILM COATED ORAL
Status: DISCONTINUED | OUTPATIENT
Start: 2020-05-01 | End: 2020-05-02

## 2020-05-01 RX ORDER — CARBOPROST TROMETHAMINE 250 UG/ML
250 INJECTION, SOLUTION INTRAMUSCULAR
Status: DISCONTINUED | OUTPATIENT
Start: 2020-05-01 | End: 2020-05-02

## 2020-05-01 RX ORDER — ONDANSETRON 4 MG/1
4 TABLET, ORALLY DISINTEGRATING ORAL EVERY 6 HOURS PRN
Status: DISCONTINUED | OUTPATIENT
Start: 2020-05-01 | End: 2020-05-02

## 2020-05-01 RX ORDER — ONDANSETRON 2 MG/ML
4 INJECTION INTRAMUSCULAR; INTRAVENOUS EVERY 6 HOURS PRN
Status: DISCONTINUED | OUTPATIENT
Start: 2020-05-01 | End: 2020-05-02

## 2020-05-01 RX ORDER — NALOXONE HYDROCHLORIDE 0.4 MG/ML
.1-.4 INJECTION, SOLUTION INTRAMUSCULAR; INTRAVENOUS; SUBCUTANEOUS
Status: DISCONTINUED | OUTPATIENT
Start: 2020-05-01 | End: 2020-05-02

## 2020-05-01 RX ORDER — FENTANYL CITRATE 50 UG/ML
50-100 INJECTION, SOLUTION INTRAMUSCULAR; INTRAVENOUS
Status: DISCONTINUED | OUTPATIENT
Start: 2020-05-01 | End: 2020-05-02

## 2020-05-01 RX ORDER — SODIUM CHLORIDE, SODIUM LACTATE, POTASSIUM CHLORIDE, CALCIUM CHLORIDE 600; 310; 30; 20 MG/100ML; MG/100ML; MG/100ML; MG/100ML
INJECTION, SOLUTION INTRAVENOUS CONTINUOUS
Status: DISCONTINUED | OUTPATIENT
Start: 2020-05-01 | End: 2020-05-02

## 2020-05-01 RX ORDER — OXYCODONE AND ACETAMINOPHEN 5; 325 MG/1; MG/1
1 TABLET ORAL
Status: DISCONTINUED | OUTPATIENT
Start: 2020-05-01 | End: 2020-05-02

## 2020-05-01 RX ORDER — PHENYLEPHRINE HCL IN 0.9% NACL 1 MG/10 ML
100 SYRINGE (ML) INTRAVENOUS EVERY 5 MIN PRN
Status: DISCONTINUED | OUTPATIENT
Start: 2020-05-01 | End: 2020-05-02

## 2020-05-01 RX ORDER — LIDOCAINE HYDROCHLORIDE AND EPINEPHRINE 15; 5 MG/ML; UG/ML
3 INJECTION, SOLUTION EPIDURAL
Status: DISCONTINUED | OUTPATIENT
Start: 2020-05-01 | End: 2020-05-02

## 2020-05-01 RX ORDER — TRANEXAMIC ACID 10 MG/ML
1 INJECTION, SOLUTION INTRAVENOUS EVERY 30 MIN PRN
Status: DISCONTINUED | OUTPATIENT
Start: 2020-05-01 | End: 2020-05-02

## 2020-05-01 RX ORDER — OXYTOCIN 10 [USP'U]/ML
10 INJECTION, SOLUTION INTRAMUSCULAR; INTRAVENOUS
Status: DISCONTINUED | OUTPATIENT
Start: 2020-05-01 | End: 2020-05-02

## 2020-05-01 RX ADMIN — DINOPROSTONE 10 MG: 10 INSERT VAGINAL at 20:59

## 2020-05-02 PROBLEM — Z34.90 ENCOUNTER FOR ELECTIVE INDUCTION OF LABOR: Status: ACTIVE | Noted: 2020-05-02

## 2020-05-02 LAB
ALBUMIN SERPL-MCNC: 2.3 G/DL (ref 3.4–5)
ALP SERPL-CCNC: 210 U/L (ref 40–150)
ALT SERPL W P-5'-P-CCNC: 19 U/L (ref 0–50)
ANION GAP SERPL CALCULATED.3IONS-SCNC: 4 MMOL/L (ref 3–14)
AST SERPL W P-5'-P-CCNC: 26 U/L (ref 0–45)
BILIRUB SERPL-MCNC: 0.4 MG/DL (ref 0.2–1.3)
BUN SERPL-MCNC: 8 MG/DL (ref 7–30)
CALCIUM SERPL-MCNC: 8.8 MG/DL (ref 8.5–10.1)
CHLORIDE SERPL-SCNC: 109 MMOL/L (ref 94–109)
CO2 SERPL-SCNC: 26 MMOL/L (ref 20–32)
CREAT SERPL-MCNC: 0.55 MG/DL (ref 0.52–1.04)
ERYTHROCYTE [DISTWIDTH] IN BLOOD BY AUTOMATED COUNT: 13.3 % (ref 10–15)
GFR SERPL CREATININE-BSD FRML MDRD: >90 ML/MIN/{1.73_M2}
GLUCOSE SERPL-MCNC: 81 MG/DL (ref 70–99)
HCT VFR BLD AUTO: 32.1 % (ref 35–47)
HGB BLD-MCNC: 10.1 G/DL (ref 11.7–15.7)
MCH RBC QN AUTO: 26.9 PG (ref 26.5–33)
MCHC RBC AUTO-ENTMCNC: 31.5 G/DL (ref 31.5–36.5)
MCV RBC AUTO: 86 FL (ref 78–100)
PLATELET # BLD AUTO: 198 10E9/L (ref 150–450)
POTASSIUM SERPL-SCNC: 4 MMOL/L (ref 3.4–5.3)
PROT SERPL-MCNC: 6.4 G/DL (ref 6.8–8.8)
RBC # BLD AUTO: 3.75 10E12/L (ref 3.8–5.2)
SARS-COV-2 PCR COMMENT: NORMAL
SARS-COV-2 RNA SPEC QL NAA+PROBE: NEGATIVE
SODIUM SERPL-SCNC: 139 MMOL/L (ref 133–144)
SPECIMEN SOURCE: NORMAL
T PALLIDUM AB SER QL: NONREACTIVE
URATE SERPL-MCNC: 5.4 MG/DL (ref 2.6–6)
WBC # BLD AUTO: 13.8 10E9/L (ref 4–11)

## 2020-05-02 PROCEDURE — 25000128 H RX IP 250 OP 636: Performed by: ANESTHESIOLOGY

## 2020-05-02 PROCEDURE — 25000132 ZZH RX MED GY IP 250 OP 250 PS 637: Performed by: FAMILY MEDICINE

## 2020-05-02 PROCEDURE — 12000000 ZZH R&B MED SURG/OB

## 2020-05-02 PROCEDURE — 00HU33Z INSERTION OF INFUSION DEVICE INTO SPINAL CANAL, PERCUTANEOUS APPROACH: ICD-10-PCS | Performed by: ANESTHESIOLOGY

## 2020-05-02 PROCEDURE — 36415 COLL VENOUS BLD VENIPUNCTURE: CPT | Performed by: FAMILY MEDICINE

## 2020-05-02 PROCEDURE — 25000125 ZZHC RX 250: Performed by: FAMILY MEDICINE

## 2020-05-02 PROCEDURE — 72200001 ZZH LABOR CARE VAGINAL DELIVERY SINGLE

## 2020-05-02 PROCEDURE — 88307 TISSUE EXAM BY PATHOLOGIST: CPT | Performed by: FAMILY MEDICINE

## 2020-05-02 PROCEDURE — 25800030 ZZH RX IP 258 OP 636: Performed by: FAMILY MEDICINE

## 2020-05-02 PROCEDURE — 88307 TISSUE EXAM BY PATHOLOGIST: CPT | Mod: 26 | Performed by: FAMILY MEDICINE

## 2020-05-02 PROCEDURE — 59400 OBSTETRICAL CARE: CPT | Performed by: FAMILY MEDICINE

## 2020-05-02 PROCEDURE — 80053 COMPREHEN METABOLIC PANEL: CPT | Performed by: FAMILY MEDICINE

## 2020-05-02 PROCEDURE — 85027 COMPLETE CBC AUTOMATED: CPT | Performed by: FAMILY MEDICINE

## 2020-05-02 PROCEDURE — 3E0R3BZ INTRODUCTION OF ANESTHETIC AGENT INTO SPINAL CANAL, PERCUTANEOUS APPROACH: ICD-10-PCS | Performed by: ANESTHESIOLOGY

## 2020-05-02 PROCEDURE — 84550 ASSAY OF BLOOD/URIC ACID: CPT | Performed by: FAMILY MEDICINE

## 2020-05-02 RX ORDER — NALOXONE HYDROCHLORIDE 0.4 MG/ML
.1-.4 INJECTION, SOLUTION INTRAMUSCULAR; INTRAVENOUS; SUBCUTANEOUS
Status: DISCONTINUED | OUTPATIENT
Start: 2020-05-02 | End: 2020-05-03 | Stop reason: HOSPADM

## 2020-05-02 RX ORDER — HYDROCODONE BITARTRATE AND ACETAMINOPHEN 5; 325 MG/1; MG/1
1 TABLET ORAL EVERY 4 HOURS PRN
Status: DISCONTINUED | OUTPATIENT
Start: 2020-05-02 | End: 2020-05-03 | Stop reason: HOSPADM

## 2020-05-02 RX ORDER — BISACODYL 10 MG
10 SUPPOSITORY, RECTAL RECTAL DAILY PRN
Status: DISCONTINUED | OUTPATIENT
Start: 2020-05-04 | End: 2020-05-03 | Stop reason: HOSPADM

## 2020-05-02 RX ORDER — OXYTOCIN/0.9 % SODIUM CHLORIDE 30/500 ML
100 PLASTIC BAG, INJECTION (ML) INTRAVENOUS CONTINUOUS
Status: DISCONTINUED | OUTPATIENT
Start: 2020-05-02 | End: 2020-05-03 | Stop reason: HOSPADM

## 2020-05-02 RX ORDER — LABETALOL 100 MG/1
100 TABLET, FILM COATED ORAL EVERY 12 HOURS SCHEDULED
Status: DISCONTINUED | OUTPATIENT
Start: 2020-05-02 | End: 2020-05-03 | Stop reason: HOSPADM

## 2020-05-02 RX ORDER — AMOXICILLIN 250 MG
1 CAPSULE ORAL 2 TIMES DAILY
Status: DISCONTINUED | OUTPATIENT
Start: 2020-05-02 | End: 2020-05-03 | Stop reason: HOSPADM

## 2020-05-02 RX ORDER — MISOPROSTOL 200 UG/1
800 TABLET ORAL
Status: DISCONTINUED | OUTPATIENT
Start: 2020-05-02 | End: 2020-05-03 | Stop reason: HOSPADM

## 2020-05-02 RX ORDER — OXYTOCIN 10 [USP'U]/ML
10 INJECTION, SOLUTION INTRAMUSCULAR; INTRAVENOUS
Status: DISCONTINUED | OUTPATIENT
Start: 2020-05-02 | End: 2020-05-03 | Stop reason: HOSPADM

## 2020-05-02 RX ORDER — HYDROCORTISONE 2.5 %
CREAM (GRAM) TOPICAL 3 TIMES DAILY PRN
Status: DISCONTINUED | OUTPATIENT
Start: 2020-05-02 | End: 2020-05-03 | Stop reason: HOSPADM

## 2020-05-02 RX ORDER — TRANEXAMIC ACID 10 MG/ML
1 INJECTION, SOLUTION INTRAVENOUS EVERY 30 MIN PRN
Status: DISCONTINUED | OUTPATIENT
Start: 2020-05-02 | End: 2020-05-03 | Stop reason: HOSPADM

## 2020-05-02 RX ORDER — ACETAMINOPHEN 325 MG/1
650 TABLET ORAL EVERY 4 HOURS PRN
Status: DISCONTINUED | OUTPATIENT
Start: 2020-05-02 | End: 2020-05-03 | Stop reason: HOSPADM

## 2020-05-02 RX ORDER — IBUPROFEN 800 MG/1
800 TABLET, FILM COATED ORAL EVERY 6 HOURS PRN
Status: DISCONTINUED | OUTPATIENT
Start: 2020-05-02 | End: 2020-05-03 | Stop reason: HOSPADM

## 2020-05-02 RX ORDER — AMOXICILLIN 250 MG
2 CAPSULE ORAL 2 TIMES DAILY
Status: DISCONTINUED | OUTPATIENT
Start: 2020-05-02 | End: 2020-05-03 | Stop reason: HOSPADM

## 2020-05-02 RX ORDER — OXYTOCIN/0.9 % SODIUM CHLORIDE 30/500 ML
340 PLASTIC BAG, INJECTION (ML) INTRAVENOUS CONTINUOUS PRN
Status: DISCONTINUED | OUTPATIENT
Start: 2020-05-02 | End: 2020-05-03 | Stop reason: HOSPADM

## 2020-05-02 RX ORDER — BUPIVACAINE HYDROCHLORIDE 2.5 MG/ML
INJECTION, SOLUTION EPIDURAL; INFILTRATION; INTRACAUDAL PRN
Status: DISCONTINUED | OUTPATIENT
Start: 2020-05-02 | End: 2020-05-02

## 2020-05-02 RX ORDER — MODIFIED LANOLIN
OINTMENT (GRAM) TOPICAL
Status: DISCONTINUED | OUTPATIENT
Start: 2020-05-02 | End: 2020-05-03 | Stop reason: HOSPADM

## 2020-05-02 RX ADMIN — IBUPROFEN 800 MG: 800 TABLET ORAL at 19:09

## 2020-05-02 RX ADMIN — IBUPROFEN 800 MG: 800 TABLET ORAL at 12:59

## 2020-05-02 RX ADMIN — SODIUM CHLORIDE, POTASSIUM CHLORIDE, SODIUM LACTATE AND CALCIUM CHLORIDE 1000 ML: 600; 310; 30; 20 INJECTION, SOLUTION INTRAVENOUS at 03:54

## 2020-05-02 RX ADMIN — Medication: at 04:22

## 2020-05-02 RX ADMIN — SENNOSIDES AND DOCUSATE SODIUM 1 TABLET: 8.6; 5 TABLET ORAL at 19:58

## 2020-05-02 RX ADMIN — IBUPROFEN 800 MG: 800 TABLET ORAL at 07:00

## 2020-05-02 RX ADMIN — Medication 340 ML/HR: at 05:42

## 2020-05-02 RX ADMIN — BUPIVACAINE HYDROCHLORIDE 10 ML: 2.5 INJECTION, SOLUTION EPIDURAL; INFILTRATION; INTRACAUDAL at 04:18

## 2020-05-02 RX ADMIN — ACETAMINOPHEN 650 MG: 325 TABLET, FILM COATED ORAL at 19:58

## 2020-05-02 ASSESSMENT — ACTIVITIES OF DAILY LIVING (ADL)
AMBULATION: 0-->INDEPENDENT
TRANSFERRING: 0-->INDEPENDENT
TOILETING: 0-->INDEPENDENT
FALL_HISTORY_WITHIN_LAST_SIX_MONTHS: NO
SWALLOWING: 0-->SWALLOWS FOODS/LIQUIDS WITHOUT DIFFICULTY
BATHING: 0-->INDEPENDENT
RETIRED_COMMUNICATION: 0-->UNDERSTANDS/COMMUNICATES WITHOUT DIFFICULTY
DRESS: 0-->INDEPENDENT
COGNITION: 0 - NO COGNITION ISSUES REPORTED
RETIRED_EATING: 0-->INDEPENDENT

## 2020-05-02 NOTE — PROVIDER NOTIFICATION
05/01/20 2019   Provider Notification   Provider Name/Title Dr. Garcia   Method of Notification Phone   Request Evaluate - Remote   Notification Reason Patient Arrived;Membrane Status;Uterine Activity;SVE   Updated Dr. Daniel pt arrive for elective induction. SVE 3/50/-3, woodall score 5. Intact, no vaginal bleeding. Wanda every 3-4 minutes, not feeling. Pt will be wanting and epidural during labor. FHT cat 1 tracing. MD gave TORB for Cervidil induction and intrapartum orders.

## 2020-05-02 NOTE — H&P
[facility] Labor and Delivery History and Physical     Late entry - entered after delivery     Micki Thorpe MRN# [id number]   Age: 24 year old YOB: 1995     Date of Admission:  2020    Primary care provider: Ashwini Garcia           Chief Complaint:   Micki Thorpe is a 24 year old female who is 39w6d pregnant and being admitted  On 2020 pm for cervical ripening and   induction of labor, indication COVID-19 novel coronavirus pandemic.  Pt is NOT  ill with COVID-19 novel coronavirus.           Pregnancy history:     OBSTETRIC HISTORY:    OB History    Para Term  AB Living   3 2 2 0 0 2   SAB TAB Ectopic Multiple Live Births   0 0 0 0 2      # Outcome Date GA Lbr Rufino/2nd Weight Sex Delivery Anes PTL Lv   3 Current            2 Term 16 40w5d 04:13 / 00:33 3.32 kg (7 lb 5.1 oz) M Vag-Spont EPI, IV REGIONAL  JEFFERY      Birth Comments: none - long umbilical cord - had CAN x 1 reduced prior to delivery of fetal shoulders and loose cord around left leg x 2       Name: Jefry Holman       Apgar1: 8  Apgar5: 9   1 Term 14 40w3d 03:14 / :42 3.175 kg (7 lb) F Vag-Spont EPI, IV REGIONAL  JEFFERY      Birth Comments: none       Name: Lotus Holman       Apgar1: 9  Apgar5: 9       EDC: Estimated Date of Delivery: 5/3/20    Prenatal Labs:   Lab Results   Component Value Date    ABO AB 2020    RH Pos 2020    AS Neg 10/03/2019    HEPBANG Nonreactive 10/03/2019    CHPCRT Negative 10/03/2019    GCPCRT Negative 10/03/2019    TREPAB Negative 2016    RUBELLAABIGG 13 2013    HGB 10.2 (L) 2020       GBS Status:   Lab Results   Component Value Date    GBS Negative 2020       Active Problem List  Patient Active Problem List   Diagnosis     Blood type AB+ - no need for rhogam     Encounter for supervision of other normal pregnancy, third trimester     CARDIOVASCULAR SCREENING; LDL GOAL LESS THAN 160     Unplanned wanted pregnancy - x 2-  both on ocp's - 2nd on micronor      Cervical high risk HPV (human papillomavirus) test positive     Anxiety     Irritability and anger     Dysplastic nevus of trunk - Compound dysplastic nevus with moderate to severe atypia s/p MOHS procedure - Dr. Clay      Benign paroxysmal positional vertigo of right ear     Strain of neck muscle, initial encounter     Uterine size date discrepancy pregnancy, second trimester- measuring 28cm at 22.5wks EGA      Indication for care in labor or delivery       Medication Prior to Admission  Medications Prior to Admission   Medication Sig Dispense Refill Last Dose     ferrous gluconate (FERGON) 324 (38 Fe) MG tablet Take 1 tablet (324 mg) by mouth daily (with breakfast) 90 tablet 0 2020 at Unknown time     Prenatal Vit-Fe Fumarate-FA (PRENATAL VITAMIN) 27-0.8 MG TABS Take 1 tablet by mouth daily   2020 at Unknown time     fluocinonide (LIDEX) 0.05 % external solution Apply to scalp BID x 2-4 weeks. Do not use on face. Taper with improvement 60 mL 3 More than a month at Unknown time     order for DME Equipment being ordered: breast pump and all necessary accessories including tubing, valves, connectors etc. 1 Device 0    .        Maternal Past Medical History:     Past Medical History:   Diagnosis Date     Blood type AB+      Cervical high risk HPV (human papillomavirus) test positive 2015    age20, normal pap     Depressive disorder 3/2017      (normal spontaneous vaginal delivery) 2014     Status post induction of labor for low ALBERT and post-dates 2014     Streptococcus infection in conditions classified elsewhere and of unspecified site, group A     h/o     Unplanned wanted pregnancy     x 2- both on ocp's - 2nd on micronor                        Family History:     I have reviewed this patient's family history  Family History   Problem Relation Age of Onset     Lipids Mother      Lipids Father      Diabetes Paternal Grandmother      C.A.D. Maternal  Grandfather      Lipids Maternal Grandfather      Diabetes Maternal Grandfather      Diabetes Maternal Grandmother      Lipids Maternal Grandmother      [email or web address]. Maternal Grandmother      Cancer - colorectal Maternal Grandmother 73     Skin Cancer Maternal Aunt      Familial Atypical Mole-Malignant Melanoma Syndrome Paternal Cousin      Family history reviewed and updated in [facility]            Social History:   This patient has no significant social history         Review of Systems:   CONSTITUTIONAL: NEGATIVE for fever, chills, change in weight  ENT/MOUTH: NEGATIVE for ear, mouth and throat problems  RESP: NEGATIVE for significant cough or SOB  CV: NEGATIVE for chest pain, palpitations or peripheral edema          Physical Exam:     Vitals were reviewed  Patient Vitals for the past 12 hrs:   BP Temp Temp src Resp   05/02/20 0515 131/62 -- -- --   05/02/20 0509 128/84 -- -- --   05/02/20 0505 122/69 -- -- --   05/02/20 0500 133/74 -- -- --   05/02/20 0459 133/74 -- -- --   05/02/20 0431 127/58 -- -- --   05/02/20 0430 127/58 -- -- --   05/02/20 0429 130/58 -- -- --   05/02/20 0427 128/56 -- -- --   05/02/20 0426 116/78 -- -- --   05/02/20 0423 111/68 -- -- --   05/02/20 0421 120/76 -- -- --   05/02/20 0419 121/74 -- -- --   05/02/20 0415 121/74 -- -- --   05/02/20 0200 119/74 -- -- 20   05/02/20 0000 -- 98.4  F (36.9  C) Oral --   05/01/20 2009 129/84 98.7  F (37.1  C) Oral 18     Constitutional:   awake, alert, cooperative, no apparent distress, and appears stated age      Cervix at 0500:    Membranes: SROM = clear with some bloody show    Dilation: 9   Effacement: 90%   Station:+1   Consistency: soft   Position: Anterior  Presentation:Cephalic  Fetal Heart Rate Tracing: reactive and reassuring, Tier 2 (indeterminate) several variable decelerations down to 80-90 bpm after SROM.   Tocometer: external monitor and frequency q 2-3 minutes                       Assessment:   Micki Thorpe is a 39w6d pregnant female  admitted last pm for cervical ripening with induction of labor, indication COVID-19 novel coronavirus pandemic.   Pt is NOT  ill with COVID-19 novel coronavirus.           Plan:   Admit - see IP orders  Anticipate   Epidural in place.     Ashwini Garcia MD

## 2020-05-02 NOTE — PLAN OF CARE
VSS. Blood pressure stable 131/75. Denies s/s PIH. Breast feeding well every 2-3 hours. Up ad claudia, voiding without difficult. Denies pain at this time.

## 2020-05-02 NOTE — PLAN OF CARE
Data: [facility] presented [facility]: 2020  7:39 PM.  Patient admitted for induction for elective. Patient is a .  Prenatal record reviewed. Pregnancy has been uncomplicated..  Gestational Age 39w5d. VSS. Fetal movement present. Patient denies uterine contractions, leaking of vaginal fluid/rupture of membranes, vaginal bleeding, abdominal pain, pelvic pressure, nausea, vomiting, headache, visual disturbances, epigastric or URQ pain, significant edema. Support person is present.   Action: Verbal consent for EFM.  Admission assessment completed. Bill of rights reviewed.  Response: Patient verbalized agreement with plan. Will contact Dr Ashwini Garcia with update and further orders.

## 2020-05-02 NOTE — PROVIDER NOTIFICATION
05/02/20 0314   Provider Notification   Provider Name/Title Dr. Garcia   Method of Notification Phone   Request Evaluate - Remote   Notification Reason Uterine Activity;SVE;Pain   Pt more uncomfortable with contractions, preet every 2-4 minutes. Palpate moderate. SVE 4/70/-2, woodall 8. Intact PRICILA. MD gave TORB to remove Cervidil at this time and start Pitocin @ 0515.

## 2020-05-02 NOTE — ANESTHESIA PREPROCEDURE EVALUATION
Anesthesia Pre-Procedure Evaluation    Patient: Micki Thorpe   MRN: [id number] : 1995          Preoperative Diagnosis: * No surgery found *        Past Medical History:   Diagnosis Date     Blood type AB+      Cervical high risk HPV (human papillomavirus) test positive 2015    age18, normal pap     Depressive disorder 3/2017      (normal spontaneous vaginal delivery) 2014     Status post induction of labor for low ALBERT and post-dates 2014     Streptococcus infection in conditions classified elsewhere and of unspecified site, group A     h/o     Unplanned wanted pregnancy     x 2- both on ocp's - 2nd on micronor      Past Surgical History:   Procedure Laterality Date     APPENDECTOMY  3/09    dr francois     HC REMOVE TONSILS/ADENOIDS,<13 Y/O  10/01    T & A <11y.o.     MOHS MICROGRAPHIC PROCEDURE      left upper abd -Compound dysplastic nevus with moderate to severe atypia      Anesthesia Evaluation       history and physical reviewed .      No history of anesthetic complications          ROS/MED HX    ENT/Pulmonary:  - neg pulmonary ROS     Neurologic:  - neg neurologic ROS     Cardiovascular:  - neg cardiovascular ROS       METS/Exercise Tolerance:     Hematologic:         Musculoskeletal:         GI/Hepatic:  - neg GI/hepatic ROS       Renal/Genitourinary:         Endo:         Psychiatric:         Infectious Disease:         Malignancy:         Other:                     neg OB ROS            Physical Exam  Normal systems: cardiovascular, pulmonary and dental    Airway   Mallampati: II  TM distance: > 3 FB  Neck ROM: full  Mouth opening: > 3 cm    Dental     Cardiovascular       Pulmonary             Lab Results   Component Value Date    WBC 11.2 (H) 10/03/2019    HGB 10.2 (L) 2020    HCT 37.5 10/03/2019     10/03/2019     2015    POTASSIUM 4.4 2015    CHLORIDE 105 2015    CO2 26 2015    BUN 14 2015    CR 0.50 (L) 2015    GLC 70  "11/02/2015    PERLA 8.9 11/02/2015    ALBUMIN 2.9 (L) 12/14/2015    PROTTOTAL 8.1 10/05/2018    ALT 15 12/14/2015    AST 14 12/14/2015    ALKPHOS 79 12/14/2015    BILITOTAL 0.2 12/14/2015    TSH 2.11 10/05/2018    HCG Positive (A) 09/05/2019       Preop Vitals  BP Readings from Last 3 Encounters:   05/02/20 127/58   04/29/20 130/80   04/22/20 126/76    Pulse Readings from Last 3 Encounters:   04/29/20 119   04/22/20 118   04/06/20 96      Resp Readings from Last 3 Encounters:   05/02/20 20   03/24/16 16   03/11/16 12    SpO2 Readings from Last 3 Encounters:   04/29/20 97%   04/22/20 98%   04/06/20 98%      Temp Readings from Last 1 Encounters:   05/02/20 98.4  F (36.9  C) (Oral)    Ht Readings from Last 1 Encounters:   04/22/20 1.676 m (5' 6\")      Wt Readings from Last 1 Encounters:   04/29/20 92.5 kg (204 lb)    Estimated body mass index is 32.93 kg/m  as calculated from the following:    Height as of 4/22/20: 1.676 m (5' 6\").    Weight as of 4/29/20: 92.5 kg (204 lb).       Anesthesia Plan      History & Physical Review      ASA Status:  2 .  OB Epidural Asa: 2       Plan for Epidural            Postoperative Care      Consents  Anesthetic plan, risks, benefits and alternatives discussed with:  Patient..                 Jacobo Watters MD                    .  "

## 2020-05-02 NOTE — PROVIDER NOTIFICATION
05/02/20 0429   Provider Notification   Provider Name/Title Dr. Garcia   Method of Notification Electronic Page   Request Attend Delivery   Notification Reason SVE;Status Update     Notified MD that SVE is 6/80/-2 with bulging back of water. MD will make her way in for delivery. Primary RN notified.    Eliza Snow RN on 5/2/2020 at 4:30 AM

## 2020-05-02 NOTE — PROVIDER NOTIFICATION
05/02/20 1337   Provider Notification   Provider Name/Title Dr. Garcia   Method of Notification Phone   Request Evaluate-Remote   Notification Reason Status Update   Notified of blood pressure recheck of 128/82. Will hold labetalol dose that is scheduled for now. Continue to monitor blood pressure every 4 hours, notify for >140/90.

## 2020-05-02 NOTE — PROVIDER NOTIFICATION
05/02/20 0415   Provider Notification   Provider Name/Title Dr. Watters   Method of Notification At Bedside   Request Evaluate in Person   Notification Reason Pain   Dr. Watters at bedside for epidural placement

## 2020-05-02 NOTE — PLAN OF CARE
Patient up ad claudia in room, voiding without difficulty. Pain controlled with use of oral pain medications. Has had elevated blood pressure since delivery, see provider notification notes. Denies headache, epigastric pain, and blurred vision. Independent with self and infant cares.

## 2020-05-02 NOTE — L&D DELIVERY NOTE
Delivery Summary    Micki Thorpe MRN# [id number]   Age: 24 year old YOB: 1995     Labor Event Times:    Labor Onset Date       Labor Onset Time    Dilation Complete Date    Dilation Complete Time       Start Pushing Date        Start Pushing Time            Labor Length:    1st Stage (hrs/min) 2.00 22.00   2nd Stage (hrs/min) 0.00 14.00   3rd Stage (hrs/min) 0.00 6.00       Labor Events:     Labor No   Rupture Date 2020    Rupture Time 4:45 AM    Rupture Type Spontaneous rupture of membranes occuring during spontaneous labor or augmentation   Fluid Color Clear    Labor Type  incuction/ cervical ripening with cervidil    Induction    Induction Indication   COVID-19 novel coronavirus pandemic       Augmentation None    Labor Complications  Baby had CAN x 1 with left hand next to face on delivery of the fetal head, which delivered in a controlled fashion at the very end of a contraction.baby's nose and mouth were suctioned on the perineum.  CAN was able to be reduced prior to delivery of the anterior shoulder which delivered spontaneously in between contractions.  Pt did not have a true shoulder dystocia, but gentle traction on the fetal head and under the anterior axilla and McRobert's maneuver were used to deliver rest of the fetal body between contractions as baby's face were noted to be a little bluish prior to delivery of the anterior shoulder.     I clamped and cut cord and baby was taken to warmer with  delayed cord clamping NOT done when baby was noted to NOT be vigorous immediately after delivery with some bluish tone to face.  Baby received clean warm blanket and stimulation and perked up very quickly to crying vigorously on the warmer.  No other resuscitative measures other than bulb suctioning were used for baby.    Additional Complications     Management of Labor        Antibiotics     IV Antibiotic Given     Additional Management     Fetal Status Prior to  Delivery  category 2     Fetal Status Comments  see above.        Cervical Ripening: Cervidil    Date  2020   Time     Type         Delivery:    Episiotomy None   Local Anesthetic        Lacerations None   Sponge Count Correct    Yes    Needle Count Correct  Yes   Final Count by: Ashwini Garcia MD and Ceci Barrios RN    Sutures  none    Blood loss (ml)    Packing Intentionally Left In     Number     Comments           Information for the patient's :  Maurilio, Female-Micki [[id number]]       Delivery  2020 5:36 AM by  Vaginal, Spontaneous  Sex:  female Gestational Age: 39w6d  Delivery Clinician:     Living?:            APGARS  One minute Five minutes Ten minutes   Skin color:            Heart rate:            Grimace:            Muscle tone:            Breathing:            Totals: 6  9  9      Presentation/position:           Resuscitation and Interventions: Method:  None  Oxygen Type:     Intubation Time:   # of Attempts:     ETT Size:        Tracheal Suction:     Tracheal returns:       Care at Delivery:  Baby had CAN x 1 with left hand next to face on delivery of the fetal head, which delivered in a controlled fashion spontaneously just after the very end of a contraction. Baby's nose and mouth were suctioned on the perineum.  CAN was able to be reduced prior to delivery of the anterior shoulder which delivered spontaneously in between contractions.  Pt did not have a true shoulder dystocia, but gentle traction on the fetal head and under the anterior axilla and McRobert's maneuver were used to deliver rest of the fet  al body between contractions as baby's face were noted to be a little bluish prior to delivery of the anterior shoulder. ---Ashwini Garcia MD      I clamped and cut cord and baby was taken to warmer with  delayed cord clamping NOT done when   baby was noted to NOT be vigorous immediately after delivery with some bluish tone to face.  Baby received clean warm blanket and  stimulation and perked up very quickly to crying vigorously on the warmer.  No other resuscitative measures other than b  ulb suctioning were used for baby.         Cord information:     Disposition of cord blood:   Lab    Blood gases sent?  No   Complications:  see above.    Placenta: Delivered:   normal        appearance.  Comments: intact with 3 vessel cord - some calcifications in the periphery were noted..  Disposition: Pathology secondary to several late decels just prior to delivery and first Apgar = 6.   [place] Measurements:  Weight: 7 lb 13.9 oz (3570 g)  Height:    Head circumference:    Chest circumference:     Temperature:     Other providers:  none      Additional  information:  Forceps: None    Vacuum:  none.    Shoulder Dystocia Shoulder Dystocia - none    Fetal Tracing Prior to Delivery:  Category 2  Fetal Tracing Comments:  several late and variable decelerations down to 80 bpm with contractions in the last 20-30 minutes prior to delivery- good return to baseline and good variability within the deceleration   Shoulder dystocia present?:  No , but Baby had CAN x 1 with left hand next to face on delivery of the fetal head, which delivered in a controlled fashion just after  the very end of a contraction spontaneously .baby's nose and mouth were suctioned on the perineum.  CAN was able to be reduced prior to delivery of the anterior shoulder which delivered spontaneously in between contractions.  Pt did not have a true shoulder dystocia, but gentle traction on the fetal head and under the anterior axilla and McRobert's maneuver were used to deliver rest of the fetal body between contractions as baby's face were noted to be a little bluish prior to delivery of the anterior shoulder.     I clamped and cut cord and baby was taken to warmer with  delayed cord clamping NOT done when baby was noted to NOT be vigorous immediately after delivery with some bluish tone to face.  Baby received clean warm blanket  and stimulation and perked up very quickly to crying vigorously on the warmer.  No other resuscitative measures other than bulb suctioning were used for baby.             Breech:  no     : Type:     Indications for Primary:     Indications for Secondary:     Other Indications:        Observed anomalies  none    Output in Delivery Room:    None                Ashwini Garcia MD

## 2020-05-02 NOTE — ANESTHESIA PROCEDURE NOTES
Procedure note : epidural catheter  Staff -   Anesthesiologist:  Jacobo Watters MD      Performed By: anesthesiologist    Referred By: Pete    Pre-Procedure  Performed by Jacobo Watters MD  Referred by Pete  Location: OB    Procedure Times:5/2/2020 4:08 AM and 5/2/2020 4:19 AM  Pre-Anesthestic Checklist: patient identified, IV checked, risks and benefits discussed, informed consent, monitors and equipment checked, pre-op evaluation and at physician/surgeon's request    Timeout  Correct Patient: Yes   Correct Procedure: Yes   Correct Site: Yes   Correct Laterality: N/A   Correct Position: Yes   Site Marked: N/A   .   Procedure Documentation    .    Procedure: epidural catheter, .   Patient Position:sitting Insertion Site:L3-4  (midline approach) Injection technique: LORT saline   Local skin infiltrated with mL of 1% lidocaine.  YAHIR at 6 cm    Patient Prep/Sterile Barriers; mask, sterile gloves, povidone-iodine 7.5% surgical scrub, patient draped.  .  Needle: Touhy needle   Needle Gauge: 17.    Needle Length (Inches) 3.5   # of attempts: 1 and # of redirects:  .    Catheter: 19 G . .  Catheter threaded easily  6 cm epidural space.  12 cm at skin.   .    Assessment/Narrative  Paresthesias: No.  .  .  Aspiration negative for heme or CSF  . Test dose of 3 mL lidocaine 1.5% w/ 1:200,000 epinephrine at 04:15.  Test dose negative for signs of intravascular, subdural or intrathecal injection.

## 2020-05-02 NOTE — PROVIDER NOTIFICATION
05/02/20 1222   Provider Notification   Provider Name/Title Dr. Garcia   Method of Notification Phone   Request Evaluate-Remote   Notification Reason Vital Signs Change;Status Update   Notified MD of patient's blood pressures since delivery (see flowsheets). Most current 143/98. Patient denies signs/symptoms of pre-eclampsia. Resting at time of blood pressure reading, denies pain. Orders received for CBC with platelets, comprehensive metabolic panel, and uric acid labs. Recheck blood pressure at 1330, if >140/90, start PO Labetalol 100 twice daily. Will continue to monitor and update MD with elevated pressures or abnormal lab results.

## 2020-05-03 VITALS
SYSTOLIC BLOOD PRESSURE: 137 MMHG | RESPIRATION RATE: 16 BRPM | DIASTOLIC BLOOD PRESSURE: 78 MMHG | HEART RATE: 80 BPM | TEMPERATURE: 98 F

## 2020-05-03 LAB — HGB BLD-MCNC: 10.4 G/DL (ref 11.7–15.7)

## 2020-05-03 PROCEDURE — 85018 HEMOGLOBIN: CPT | Performed by: FAMILY MEDICINE

## 2020-05-03 PROCEDURE — 25000132 ZZH RX MED GY IP 250 OP 250 PS 637: Performed by: FAMILY MEDICINE

## 2020-05-03 PROCEDURE — 36415 COLL VENOUS BLD VENIPUNCTURE: CPT | Performed by: FAMILY MEDICINE

## 2020-05-03 RX ORDER — MODIFIED LANOLIN
OINTMENT (GRAM) TOPICAL
Refills: 0 | COMMUNITY
Start: 2020-05-03 | End: 2020-06-22

## 2020-05-03 RX ORDER — IBUPROFEN 800 MG/1
800 TABLET, FILM COATED ORAL EVERY 8 HOURS PRN
Qty: 24 TABLET | Refills: 0 | Status: SHIPPED | OUTPATIENT
Start: 2020-05-03 | End: 2020-06-22

## 2020-05-03 RX ORDER — ACETAMINOPHEN 325 MG/1
650 TABLET ORAL EVERY 4 HOURS PRN
COMMUNITY
Start: 2020-05-03 | End: 2020-06-22

## 2020-05-03 RX ADMIN — IBUPROFEN 800 MG: 800 TABLET ORAL at 09:43

## 2020-05-03 RX ADMIN — IBUPROFEN 800 MG: 800 TABLET ORAL at 00:59

## 2020-05-03 RX ADMIN — ACETAMINOPHEN 650 MG: 325 TABLET, FILM COATED ORAL at 09:43

## 2020-05-03 RX ADMIN — SENNOSIDES AND DOCUSATE SODIUM 1 TABLET: 8.6; 5 TABLET ORAL at 09:45

## 2020-05-03 RX ADMIN — ACETAMINOPHEN 650 MG: 325 TABLET, FILM COATED ORAL at 04:04

## 2020-05-03 RX ADMIN — ACETAMINOPHEN 650 MG: 325 TABLET, FILM COATED ORAL at 00:03

## 2020-05-03 NOTE — ANESTHESIA POSTPROCEDURE EVALUATION
Patient: Micki Thorpe      S/P epidural for labor.   I or my partner was immediately available for management of this patient during epidural analgesia infusion.  VSS.  Doing well. Block resolved.  Neuro at baseline. Denies positional headache. Minimal side effects easily managed w/ PRN meds. No apparent anesthetic complications. No follow-up required.    Jacobo Watters MD    Note:  Anesthesia Post Evaluation    Last vitals:  Vitals:    05/02/20 1957 05/03/20 0001 05/03/20 0404   BP: 139/84 128/83 139/74   Pulse: 67 71 77   Resp: 15 16    Temp: 97.8  F (36.6  C)           Electronically Signed By: Jacobo Watters MD  May 3, 2020  8:19 AM

## 2020-05-03 NOTE — DISCHARGE INSTRUCTIONS
Postpartum Vaginal Delivery Instructions    Activity       Ask family and friends for help when you need it.    Do not place anything in your vagina for 6 weeks.    You are not restricted on other activities, but take it easy for a few weeks to allow your body to recover from delivery.  You are able to do any activities you feel up to that point.    No driving until you have stopped taking your pain medications (usually two weeks after delivery).     Call your health care provider if you have any of these symptoms:       Increased pain, swelling, redness, or fluid around your stiches from an episiotomy or perineal tear.    A fever above 100.4 F (38 C) with or without chills when placing a thermometer under your tongue.    You soak a sanitary pad with blood within 1 hour, or you see blood clots larger than a golf ball.    Bleeding that lasts more than 6 weeks.    Vaginal discharge that smells bad.    Severe pain, cramping or tenderness in your lower belly area.    A need to urinate more frequently (use the toilet more often), more urgently (use the toilet very quickly), or it burns when you urinate.    Nausea and vomiting.    Redness, swelling or pain around a vein in your leg.    Problems breastfeeding or a red or painful area on your breast.    Chest pain and cough or are gasping for air.    Problems coping with sadness, anxiety, or depression.  If you have any concerns about hurting yourself or the baby, call your provider immediately.     You have questions or concerns after you return home.     Keep your hands clean:  Always wash your hands before touching your perineal area and stitches.  This helps reduce your risk of infection.  If your hands aren't dirty, you may use an alcohol hand-rub to clean your hands. Keep your nails clean and short.      -Follow up in clinic on Wednesday   [place] care number:  [phone]       Page 1 of 2  For informational purposed only. Not to replace the advice of your health care  provider. Copyright   2007 [facility]. All rights reserved. [employer] 991447 - REV 07/19.  High Blood Pressure and Pregnancy  What is high blood pressure?  A normal blood pressure is 120/80 (or  120 over 80 ). When blood pressure stays at 140/90 or higher, it is called high blood pressure (hypertension).   High blood pressure may develop during pregnancy, or you may have had it before you were pregnant. There are several kinds of high blood pressure: chronic, gestational and a condition called pre-eclampsia.  Chronic high blood pressure  This is high blood pressure that existed before pregnancy or before the 20th week of pregnancy. The blood pressure may remain high during the pregnancy and after birth. If it is not controlled, it can lead to serious health problems such as stroke and heart failure.  Gestational high blood pressure  This is high blood pressure that first starts after the 20th week of pregnancy. It usually goes away   12 weeks after the baby is born. Women who have this may need to see the doctor more often to get their blood pressure checked.  Pre-eclampsia  Women with pre-eclampsia develop high blood pressure after the 20th week of pregnancy. They may also have protein in the urine--a sign of too much stress on the kidneys.  If not treated, it can develop into a life-threatening complication called eclampsia (seizures). It will only get better after the baby is born.  Signs of pre-eclampsia include:   High blood pressure   Protein in your urine   Sudden weight gain (more than 2 pounds per week)   Swelling of the hands and face, especially under the eyes   Headaches   Sudden changes in vision, including double or blurry vision, flashing lights.   Feeling sick to your stomach or throwing up. This isn t normal later in pregnancy.   Upper belly pain.    What causes pre-eclampsia?  No one knows for sure what causes pre-eclampsia. The risk is higher in women who:   Are pregnant for the  first time or carrying more than one baby   Have a history of high blood pressure or pre-eclampsia in a previous pregnancy   Are age 40 or older   Have certain medical conditions such as diabetes, kidney disease or lupus   Are obese (very overweight)   Are .    Will high blood pressure affect my baby?  High blood pressure may cause early (premature) birth and a smaller than normal baby. When blood pressure is high, it can constrict blood vessels that supply blood to the placenta. This may reduce the amount of oxygen and nutrients your baby receives and affect your baby s growth.  An active baby is a good sign that the placenta is bringing enough oxygen and nutrients to your baby. To check how active your baby is:   We may ask you to count and record your baby s movements for a short time each day.    You may have tests once or twice a week to make sure the placenta is still doing its job to keep your baby healthy. (These are biophysical profile tests and non-stress tests.)    At each prenatal visit, we may check your weight, blood pressure and urine (to test for protein).    How can I reduce the effects of high blood pressure?  During pregnancy you will need to:   See your doctor regularly. This will help detect changes as soon as they occur.   Tell your doctor right away if you notice any of signs of pre-eclampsia.   Check your weight and blood pressure at home, if your doctor advises you to.   Follow your doctor s advice. He or she may suggest that you rest in bed at home or in the hospital.    Will I have problems after the baby is born?  This depends on the kind of high blood pressure you have while pregnant. Always follow your doctor s advice.  If you have chronic high blood pressure: You will need ongoing treatment to control it.   If you have gestational high blood pressure: This will often go away after your baby is born. You will need to follow up with your care provider. Have your blood  pressure checked at your post-partum visit and then once a year at your doctor s office.   If you have pre-eclampsia: This will often go away in the days or weeks after the baby is born.    Have your blood pressure checked at your post-partum visit and by home care . If your blood pressure is still high, ask your care team what you can do to lower it.    You may have an increased risk for high blood pressure and heart disease later in life. You will need to take steps to reduce your risk. Have your blood pressure checked at least once a year.     Page 2 of 2

## 2020-05-03 NOTE — DISCHARGE SUMMARY
[facility] Discharge Summary    Micki Thorpe MRN# [id number]   Age: 24 year old YOB: 1995     Date of Admission:  2020  Date of Discharge::  5/3/2020  Admitting Physician:  Ashwini Garcia MD  Discharge Physician:  Ashwini Garcia MD     Home clinic: [facility]          Admission Diagnoses:   Indication for care in labor or delivery  Encounter for elective induction of labor          Discharge Diagnosis:   Induced vaginal delivery secondary to COVID-19 novel coronavirus pandemic  Intrauterine pregnancy at 39+6/7 weeks gestation  Elevated blood pressures without diagnosis of hypertension           Procedures:   Procedure(s): Cervical ripening with cervidil       No other procedures performed during this admission           Medications Prior to Admission:     Medications Prior to Admission   Medication Sig Dispense Refill Last Dose     ferrous gluconate (FERGON) 324 (38 Fe) MG tablet Take 1 tablet (324 mg) by mouth daily (with breakfast) 90 tablet 0 2020 at Unknown time     Prenatal Vit-Fe Fumarate-FA (PRENATAL VITAMIN) 27-0.8 MG TABS Take 1 tablet by mouth daily   2020 at Unknown time     order for DME Equipment being ordered: breast pump and all necessary accessories including tubing, valves, connectors etc. 1 Device 0      [DISCONTINUED] fluocinonide (LIDEX) 0.05 % external solution Apply to scalp BID x 2-4 weeks. Do not use on face. Taper with improvement 60 mL 3 More than a month at Unknown time             Discharge Medications:     Current Discharge Medication List      START taking these medications    Details   acetaminophen (TYLENOL) 325 MG tablet Take 2 tablets (650 mg) by mouth every 4 hours as needed for mild pain or fever (greater than or equal to 38  C /100.4  F (oral) or 38.5  C/ 101.4  F (core).)  Qty:      Associated Diagnoses:  (normal spontaneous vaginal delivery)      ibuprofen (ADVIL/MOTRIN) 800 MG tablet Take 1 tablet (800  mg) by mouth every 8 hours as needed for other (cramping)  Qty: 24 tablet, Refills: 0    Associated Diagnoses:  (normal spontaneous vaginal delivery)      lanolin ointment Apply topically every hour as needed for dry skin (sore nipples)  Qty:  , Refills: 0    Associated Diagnoses: At risk for ineffective breastfeeding         CONTINUE these medications which have NOT CHANGED    Details   ferrous gluconate (FERGON) 324 (38 Fe) MG tablet Take 1 tablet (324 mg) by mouth daily (with breakfast)  Qty: 90 tablet, Refills: 0    Associated Diagnoses: Uterine size date discrepancy pregnancy, second trimester; Blood type AB+      Prenatal Vit-Fe Fumarate-FA (PRENATAL VITAMIN) 27-0.8 MG TABS Take 1 tablet by mouth daily    Associated Diagnoses: Encounter for supervision of other normal pregnancy in first trimester      order for DME Equipment being ordered: breast pump and all necessary accessories including tubing, valves, connectors etc.  Qty: 1 Device, Refills: 0    Associated Diagnoses: At risk for ineffective breastfeeding         STOP taking these medications       fluocinonide (LIDEX) 0.05 % external solution Comments:   Reason for Stopping:                     Consultations:   No consultations were requested during this admission          Brief History of Labor:   Cervical ripening done at 39+5/7 weeks secondary to COVID-19 coronavirus pandemic, as we are trying to minimize patient exposure to the virus,  which is now widespread in the state.  Pt went into labor with that and progressed normally.  Didn't need pitocin for labor augmentation. Received an epidural for anesthesia.  Delivered a 7lbx 14 oz viable girl over an intact perineum with Apgars 6, 9 and 9.  Baby's head delivered in between contractions and had some bruising , CAN x 1 and fetal hand next to face. Needed manual removed of rest of fetal body between contractions secondary to bluish color noted on face. No shoulder dystocia.  Cord clamped and cut  without waiting for clamping delay secondary to baby noted to be not vigorous and face still a bit blue.    baby taken to warmer right away and responded quickly to vigorous drying stimulation.   No perineal or other lacerations.        Hospital Course:   The patient's hospital course was unremarkable.  On discharge, her pain was well controlled. Vaginal bleeding is similar to peak menstrual flow.  Voiding without difficulty.  Ambulating well and tolerating a normal diet.  No fever.  Breastfeeding well.  Infant is stable.  No bowel movement yet.*  She was discharged on post-partum day #1 per her request.      Post-partum hemoglobin:   Hemoglobin   Date Value Ref Range Status   05/03/2020 10.4 (L) 11.7 - 15.7 g/dL Final             Discharge Instructions and Follow-Up:   Discharge diet: Regular   Discharge activity: Activity as tolerated   Discharge follow-up: Follow up with primary care provider in 6 weeks   Wound care: Drink plenty of fluids  Ice to area for comfort  Keep wound clean and dry           Discharge Disposition:   Discharged to home    watch for signs of pre-eclampsia.   Home care to see tomorrow re: elevated blood pressures - consider for labetalol treatment if systolic >140 or diastolic >90.    Follow up with Dr. Garcia or other physician at [facility] on Wednesday 5/6/2020.       Attestation:  I have reviewed today's vital signs, notes, medications, labs and imaging and examined pt myself.     Ashwini Garcia MD

## 2020-05-03 NOTE — PLAN OF CARE
Patient VS stable and WNL. Patient to discharge to home with  per MD. Patient discharged to home at 1322.

## 2020-05-03 NOTE — PLAN OF CARE
Pt meeting expected outcomes. Vitals stable, monitoring BP Q 4 hrs. Fundus firm and midline. Denies difficulty voiding. Pain controlled with ibuprofen and tylenol. Independent with self and baby cares. Breastfeeding , going well.

## 2020-05-05 LAB — COPATH REPORT: NORMAL

## 2020-05-06 ENCOUNTER — TELEPHONE (OUTPATIENT)
Dept: FAMILY MEDICINE | Facility: CLINIC | Age: 25
End: 2020-05-06

## 2020-05-06 ENCOUNTER — OFFICE VISIT (OUTPATIENT)
Dept: FAMILY MEDICINE | Facility: CLINIC | Age: 25
End: 2020-05-06
Payer: COMMERCIAL

## 2020-05-06 ENCOUNTER — MEDICAL CORRESPONDENCE (OUTPATIENT)
Dept: HEALTH INFORMATION MANAGEMENT | Facility: CLINIC | Age: 25
End: 2020-05-06

## 2020-05-06 VITALS
OXYGEN SATURATION: 97 % | DIASTOLIC BLOOD PRESSURE: 82 MMHG | WEIGHT: 191 LBS | BODY MASS INDEX: 30.83 KG/M2 | SYSTOLIC BLOOD PRESSURE: 130 MMHG | HEART RATE: 79 BPM

## 2020-05-06 DIAGNOSIS — U07.1 COVID-19: Primary | ICD-10-CM

## 2020-05-06 DIAGNOSIS — R03.0 ELEVATED BP WITHOUT DIAGNOSIS OF HYPERTENSION: Primary | ICD-10-CM

## 2020-05-06 LAB
ERYTHROCYTE [DISTWIDTH] IN BLOOD BY AUTOMATED COUNT: 13.3 % (ref 10–15)
HCT VFR BLD AUTO: 37.9 % (ref 35–47)
HGB BLD-MCNC: 11.8 G/DL (ref 11.7–15.7)
MCH RBC QN AUTO: 26.5 PG (ref 26.5–33)
MCHC RBC AUTO-ENTMCNC: 31.1 G/DL (ref 31.5–36.5)
MCV RBC AUTO: 85 FL (ref 78–100)
PLATELET # BLD AUTO: 239 10E9/L (ref 150–450)
RBC # BLD AUTO: 4.45 10E12/L (ref 3.8–5.2)
WBC # BLD AUTO: 7.4 10E9/L (ref 4–11)

## 2020-05-06 PROCEDURE — 85027 COMPLETE CBC AUTOMATED: CPT | Performed by: FAMILY MEDICINE

## 2020-05-06 PROCEDURE — 36415 COLL VENOUS BLD VENIPUNCTURE: CPT | Performed by: FAMILY MEDICINE

## 2020-05-06 PROCEDURE — 99214 OFFICE O/P EST MOD 30 MIN: CPT | Performed by: FAMILY MEDICINE

## 2020-05-06 PROCEDURE — 80053 COMPREHEN METABOLIC PANEL: CPT | Performed by: FAMILY MEDICINE

## 2020-05-06 NOTE — TELEPHONE ENCOUNTER
Reason for Call:  Form, our goal is to have forms completed with 72 hours, however, some forms may require a visit or additional information.    Type of letter, form or note:  medical SN orders    Who is the form from?: Home care /[facility]     Where did the form come from: form was faxed in    What clinic location was the form placed at?: [place]    Where the form was placed: Given to physician    What number is listed as a contact on the form?: Fax to [phone]       Additional comments:     Call taken on 5/6/2020 at 10:58 AM by Cindy Bowen LPN

## 2020-05-06 NOTE — PATIENT INSTRUCTIONS
LOW-SALT DIET (2 grams/day)  This diet eliminates foods that are high in salt and restricts the amount of salt that you cook with. It is most often used for patients with high blood pressure, edema (fluid retention), kidney, liver, and heart disease.  Table salt contains the mineral sodium. The body needs sodium to work normally. But too much sodium can make your health problems worse. Your healthcare provider is recommending a low-salt (also called low-sodium) diet for you. Your total daily allowance of salt (sodium) is 2 grams. This equals 2,000 milligrams (mg). It is less than 1 teaspoon of table salt. This means you can have only about 700 mg of sodium at each meal.  When you cook, limit the salt you use. And if you can avoid using salt, even better. Do not add salt at the table. So, throw away the saltshaker!  When shopping, read the package labels. Salt is often called  sodium  on the label. Choose foods that are Salt-Free, Low Salt, or Very Low Salt. Note that foods with Reduced Salt may not lower your salt intake enough.    BEVERAGES  OK: Tea, coffee, carbonated beverages, juices  AVOID: Flavored international coffees, electrolyte replacement drinks, sports beverages  BREAD & CEREALS  OK: All regular bread, rolls, cereals, cakes; low-salt crackers, matzoh crackers  AVOID: Salted crackers, pretzels, popcorn; Maltese toast, pancakes, muffins  FRUITS & DESSERTS  OK: Ice cream, frozen yogurt, juice bars, gelatin (Jell-O), cookies and pies, sugar, honey, jelly, hard candy  AVOID: Most pies, cakes and cookies prepared or processed with salt, instant pudding  MEATS  OK: All fresh meat, fish, poultry, low-salt tuna  AVOID: Smoked, pickled, brine-cured, or salted meats or fish. This includes lugo, chipped beef, corned beef, hot dogs, luncheon meats, ham, kosher meats, salt pork, sausage, canned tuna, salted codfish, smoked salmon, herring, sardines, or anchovies.  DAIRY  OK: Milk, chocolate milk, hot chocolate  mix; eggs,  Low Salt  cheeses, yogurt, egg substitute  AVOID: Processed cheese, cheese spreads, Roquefort, Camembert, and cottage cheese, buttermilk, instant breakfast drink  BEANS, POTATOES & PASTA  OK: Dry beans, split peas, lentils, potatoes, rice, macaroni, noodles, spaghetti without added salt  AVOID: Potato chips, tortilla chips, and similar products  SOUPS  OK: Low-salt soups and broths made with allowed foods  AVOID: Bouillon cubes, soups with smoked or salted meats, regular soup and broth  VEGETABLES  OK: Most are okay; low-salt tomato and vegetable juices  AVOID: Sauerkraut and other brine-soaked vegetables, pickles and other pickled vegetables, tomato juice, olives  SEASONING & SPICES  OK: Most seasonings are okay. Good substitutes for salt include: fresh herb blends, Tabasco, lemon, garlic, quiroz, vinegar, dry mustard, parsley, cilantro, horseradish, tomato paste, regular margarine, mayonnaise, butter, cream cheese, vegetable oil, cream, low-salt salad dressing and gravy  AVOID: Regular ketchup, relishes, pickles, soy sauce, teriyaki sauce, Worcestershire sauce, BBQ sauce, tartar sauce, meat tenderizer, chili sauce, regular gravy, regular salad dressing    3849-4431 [place] [address]. All rights reserved. This information is not intended as a substitute for professional medical care. Always follow your healthcare professional's instructions.Home  Back  SP    Tips for Using Less Salt  Most people with heart problems need to eat less salt (sodium). Reducing the amount of salt you eat may help control your blood pressure. The higher your blood pressure, the greater your risk for heart disease, stroke, blindness, and kidney problems.     At the Store    Make low-salt choices by reading labels carefully. Look for the total amount of sodium per serving.    Use more fresh food. Buy more fruits and vegetables. Select lean meats, fish, and poultry.    Use fewer frozen,  canned, and packaged foods which often contain a lot of sodium.  In the Kitchen    Don t add salt to food when you re cooking. Season with flavorings such as onion, garlic, pepper, salt-free herbal blends, and lemon.    Use a cookbook containing low-salt recipes. It can give you ideas for tasty meals that are healthy for your heart.    Sprinkle salt-free herbal blends on vegetables and meat.  Eating Out    Tell the  you re on a low-salt diet. Ask questions about the menu.    Order fish, chicken, and meat broiled, baked, poached, or grilled without salt, butter, or breading.    Use lemon, pepper, and salt-free herb mixes to add flavor.    Choose plain steamed rice, boiled noodles, and baked or boiled potatoes. Top potatoes with chives and a little sour cream.     Beware! Salt goes by many other names. Limit foods with these words listed as ingredients: salt, sodium, soy sauce, baking soda, baking powder, MSG, monosodium, Na (the chemical symbol for sodium). Some antacids are also high in salt.     8819-2251 [facility], [address] [place] [address], [place] [address]. All rights reserved. This information is not intended as a substitute for professional medical care. Always follow your healthcare professional's instructions.Home  Back  SP  PO    Low-Salt Choices  Eating salt (sodium) can make your body retain too much water. Excess water makes your heart work harder. Canned, packaged, and frozen foods are easy to prepare, but they are often high in sodium. Here are some ideas for low-salt foods you can easily prepare yourself.     For Breakfast    Fruit or fruit juice    Bread or an English muffin    Shredded wheat    Corn tortillas    Steamed rice, unsalted    Hot cereal, regular (not instant) made without salt  Stay away from:    Sausage, lugo, ham    Flour tortillas    Packaged muffins, pancakes, and biscuits  For Lunch and Dinner    Fresh fish, chicken, turkey, or meat-- baked, broiled, or roasted without  salt    Dry beans, cooked without salt    Tofu, stir-fried without salt  Stay away from:    Lunch meat    Cheese    Tomato juice and catsup    Canned vegetables, soups, fish    Packaged gravies and sauces    Olives, pickles, relish    Bottled salad dressings  For Snacks and Desserts    Yogurt    Popcorn, air popped, unsalted  Stay away from:    Pies    Canned and packaged puddings    Pretzels, chips, crackers, and nuts--unless the label says unsalted    3268-2664 [facility], [address] [place] [address], [place] [address]. All rights reserved. This information is not intended as a substitute for professional medical care. Always follow your healthcare professional's instructions.    Thank you so much or choosing [facility]  for your Health Care. It was a pleasure seeing you at your visit today! Please contact us with any questions or concerns you may have.                   Ashwini Garcia MD                              To reach your [facility] care team after hours call:   [phone]    PLEASE NOTE OUR HOURS HAVE CHANGED secondary to COVID-19 coronavirus pandemic, as we are trying to minimize patient exposure to the virus,  which is now widespread in the [facility].  These hours may change with very little notice.  We apologize for any inconvenience.       Our current clinic hours are:    Monday- Friday  9:00am - 4:00pm                              Saturday and Sunday : Closed to in person visits      We have telephone and virtual visit times available between 7:40am - 6pm on Mondays.                                                      And 7:40am - 5pm Tuesdays through Fridays.                  Phone:  [phone]    Our pharmacy hours:   Monday  9:00 am to 6:00 pm      Tuesday through Friday 9:00am to 5:00pm                        Saturday - 9:00 am to 12 noon       Sunday : Closed.              Phone:  [phone]      There is also information  available at our web site:  [email or web address]    If your provider ordered any lab tests and you do not receive the results within 10 business days, please call the clinic.    If you need a medication refill please contact your pharmacy.  Please allow 2 business days for your refill to be completed.    Our clinic offers telephone visits and e visits.  Please ask one of your team members to explain more.      Use [employer] (secure email communication and access to your chart) to send your primary care provider a message or make an appointment. Ask someone on your Team how to sign up for [employer].

## 2020-05-06 NOTE — PROGRESS NOTES
SUBJECTIVE:                                                    Micki Thorpe is a 24 year old female  at 4 days postpartum who presents to clinic today for the following health issues:    Recheck b/p postpartum    BP Readings from Last 3 Encounters:   05/06/20 (!) 140/90   05/03/20 137/78   04/29/20 130/80     No hx of hypertension during pregnancy or labor and delivery.  Had some mildly elevated bp's in hospital, 140/90 , then next check would be 127/82.     Is breastfeeding. Minimal pain/discomfort - taking tylenol 1000mg only  1-2x/day.      No hx of hypertension or preeclampsia with either of her 2 previous pregnancies.      Wt Readings from Last 5 Encounters:   05/06/20 86.6 kg (191 lb)   04/29/20 92.5 kg (204 lb)   04/22/20 90.7 kg (200 lb)   04/06/20 90.5 kg (199 lb 9.6 oz)   03/31/20 86.2 kg (190 lb)     Her baseline BMI was heavier prior to this pregnancy than her first two pregnancies. Was and  Is not exercising regularly at all prior to this pregnancy nor during.     Problem list and histories reviewed & adjusted, as indicated.  Additional history: as documented    Reviewed and updated as needed this visit by clinical staff  Allergies  Meds       Reviewed and updated as needed this visit by Provider        Patient Active Problem List   Diagnosis     Blood type AB+ - no need for rhogam     Encounter for supervision of other normal pregnancy, third trimester     CARDIOVASCULAR SCREENING; LDL GOAL LESS THAN 160     Unplanned wanted pregnancy - x 2- both on ocp's - 2nd on micronor      Cervical high risk HPV (human papillomavirus) test positive     Anxiety     Irritability and anger     Dysplastic nevus of trunk - Compound dysplastic nevus with moderate to severe atypia s/p MOHS procedure - Dr. Clay      Benign paroxysmal positional vertigo of right ear     Strain of neck muscle, initial encounter     Uterine size date discrepancy pregnancy, second trimester- measuring 28cm at 22.5wks EGA       Indication for care in labor or delivery     Encounter for elective induction of labor     Elevated blood pressure reading without diagnosis of hypertension       Current Outpatient Medications   Medication Sig Dispense Refill     acetaminophen (TYLENOL) 325 MG tablet Take 2 tablets (650 mg) by mouth every 4 hours as needed for mild pain or fever (greater than or equal to 38  C /100.4  F (oral) or 38.5  C/ 101.4  F (core).)       ferrous gluconate (FERGON) 324 (38 Fe) MG tablet Take 1 tablet (324 mg) by mouth daily (with breakfast) 90 tablet 0     ibuprofen (ADVIL/MOTRIN) 800 MG tablet Take 1 tablet (800 mg) by mouth every 8 hours as needed for other (cramping) 24 tablet 0     lanolin ointment Apply topically every hour as needed for dry skin (sore nipples)  0     order for DME Equipment being ordered: breast pump and all necessary accessories including tubing, valves, connectors etc. 1 Device 0     Prenatal Vit-Fe Fumarate-FA (PRENATAL VITAMIN) 27-0.8 MG TABS Take 1 tablet by mouth daily          No Known Allergies         ROS:   ROS: 12 point ROS neg other than the symptoms noted above.     OBJECTIVE:                                                    BP (!) 140/90   Pulse 79   Wt 86.6 kg (191 lb)   LMP 07/28/2019 (Exact Date)   SpO2 97%   BMI 30.83 kg/m    Body mass index is 30.83 kg/m .   Recheck bp = 130/82.    GENERAL: healthy, alert, well nourished, well hydrated, no distress  HENT: ear canals- normal; TMs- normal; Nose- normal; Mouth- no ulcers, no lesions  NECK: no tenderness, no adenopathy, no asymmetry, no masses, no stiffness; thyroid- normal to palpation  RESP: lungs clear to auscultation - no rales, no rhonchi, no wheezes  CV: regular rates and rhythm, normal S1 S2, no S3 or S4 and no murmur, no click or rub -  ABDOMEN: soft, no tenderness, no  hepatosplenomegaly, no masses, normal bowel sounds  MS: extremities- no gross deformities noted, no edema    Diagnostic test results:  Diagnostic Test  Results:  Labs reviewed in Epic     ASSESSMENT/PLAN:                                                        ICD-10-CM    1. Elevated BP without diagnosis of hypertension- postpartum - watching   R03.0 Comprehensive metabolic panel (BMP + Alb, Alk Phos, ALT, AST, Total. Bili, TP)     CBC with platelets        Recheck bp next week as a nurse only visit.  Watch for signs of postpartum hypertension or pre-eclampsia - swelling in legs, headache, and right upper quadrant abdominal pain.  Ok to bring baby for that appointment as pt is breastfeeding on demand.     See Patient Instructions         Ashwini Garcia MD    [facility] [place]

## 2020-05-06 NOTE — PROGRESS NOTES
Clinic Care Coordination Contact    Situation: Patient chart reviewed by care coordinator.    Background: Patient was recently tested for COVID-19. Negative Result.    Assessment: Pt will benefit from a Get Well Loop order for further follow-up.    Plan/Recommendations: Get Well Loop order entered to monitor symptoms and address any potential future concerns regarding COVID-19.    Karissa Omer [facility]  Clinic Care Coordinator  Ph. [phone]  [email or web address]

## 2020-05-07 LAB
ALBUMIN SERPL-MCNC: 2.9 G/DL (ref 3.4–5)
ALP SERPL-CCNC: 178 U/L (ref 40–150)
ALT SERPL W P-5'-P-CCNC: 43 U/L (ref 0–50)
ANION GAP SERPL CALCULATED.3IONS-SCNC: 6 MMOL/L (ref 3–14)
AST SERPL W P-5'-P-CCNC: 37 U/L (ref 0–45)
BILIRUB SERPL-MCNC: 0.3 MG/DL (ref 0.2–1.3)
BUN SERPL-MCNC: 10 MG/DL (ref 7–30)
CALCIUM SERPL-MCNC: 8.6 MG/DL (ref 8.5–10.1)
CHLORIDE SERPL-SCNC: 108 MMOL/L (ref 94–109)
CO2 SERPL-SCNC: 25 MMOL/L (ref 20–32)
CREAT SERPL-MCNC: 0.66 MG/DL (ref 0.52–1.04)
GFR SERPL CREATININE-BSD FRML MDRD: >90 ML/MIN/{1.73_M2}
GLUCOSE SERPL-MCNC: 66 MG/DL (ref 70–99)
POTASSIUM SERPL-SCNC: 4 MMOL/L (ref 3.4–5.3)
PROT SERPL-MCNC: 7.4 G/DL (ref 6.8–8.8)
SODIUM SERPL-SCNC: 139 MMOL/L (ref 133–144)

## 2020-05-11 ENCOUNTER — ALLIED HEALTH/NURSE VISIT (OUTPATIENT)
Dept: NURSING | Facility: CLINIC | Age: 25
End: 2020-05-11
Payer: COMMERCIAL

## 2020-05-11 VITALS
DIASTOLIC BLOOD PRESSURE: 88 MMHG | WEIGHT: 188 LBS | HEART RATE: 91 BPM | RESPIRATION RATE: 20 BRPM | OXYGEN SATURATION: 97 % | BODY MASS INDEX: 30.34 KG/M2 | SYSTOLIC BLOOD PRESSURE: 132 MMHG

## 2020-05-11 DIAGNOSIS — R03.0 ELEVATED BLOOD PRESSURE READING WITHOUT DIAGNOSIS OF HYPERTENSION: Primary | ICD-10-CM

## 2020-05-11 NOTE — PROGRESS NOTES
Micki Thorpe is being followed for Blood Pressure management.      BP Readings from Last 3 Encounters:   05/11/20 132/88   05/06/20 130/82   05/03/20 137/78       Wt Readings from Last 2 Encounters:   05/11/20 85.3 kg (188 lb)   05/06/20 86.6 kg (191 lb)       Is pulse 55 or greater? - Yes    Pulse Readings from Last 1 Encounters:   05/11/20 91       Current blood pressure medication(s):  Current Outpatient Medications   Medication     acetaminophen (TYLENOL) 325 MG tablet     ibuprofen (ADVIL/MOTRIN) 800 MG tablet     lanolin ointment     order for DME     Prenatal Vit-Fe Fumarate-FA (PRENATAL VITAMIN) 27-0.8 MG TABS     No current facility-administered medications for this visit.        1. Follow up instructions include:     Next Provider visit: Follow up in 3 months..    SUBJECTIVE:                                                    The patient is not taking medication as prescribed and is tolerating well.   Patient is not monitoring Blood Pressure at home.           Out of the following complicating factors: Cough, Headache, Lightheadedness, Shortness of breath, Fatigue, Nausea, Sexual Dysfunction, New onset of swelling or edema, Weakness and New onset of Chest Pain, the patient reports:  None    OBJECTIVE:                                                      Today's BP completed using cuff size: regular on left side  arm.      Potassium   Date Value Ref Range Status   05/06/2020 4.0 3.4 - 5.3 mmol/L Final     Creatinine   Date Value Ref Range Status   05/06/2020 0.66 0.52 - 1.04 mg/dL Final     Urea Nitrogen   Date Value Ref Range Status   05/06/2020 10 7 - 30 mg/dL Final     GFR Estimate   Date Value Ref Range Status   05/06/2020 >90 >60 mL/min/[1.73_m2] Final     Comment:     Non  GFR Calc  Starting 12/18/2018, serum creatinine based estimated GFR (eGFR) will be   calculated using the Chronic Kidney Disease Epidemiology Collaboration   (CKD-EPI) equation.           Education:  general  discussion/verbal explanation  Ways to help improve BP/HTN:   Medications  Lose weight  Diet low in fat and rich in fruits, vegetables and low fat dairy products  Reduce salt in diet  Do something active for at least 30 minutes a day on most days of the week  Cut down on alcohol (if you drink more than 2 drinks per day)  Decrease stress (exercise, read, yoga, meditation, time for self, etc.)   Patient was given an opportunity to ask questions.    Patient verbalized understanding of this plan and is agreeable.    Neil Woodall RN

## 2020-05-18 ENCOUNTER — ALLIED HEALTH/NURSE VISIT (OUTPATIENT)
Dept: FAMILY MEDICINE | Facility: CLINIC | Age: 25
End: 2020-05-18
Payer: COMMERCIAL

## 2020-05-18 ENCOUNTER — TELEPHONE (OUTPATIENT)
Dept: FAMILY MEDICINE | Facility: CLINIC | Age: 25
End: 2020-05-18

## 2020-05-18 VITALS — SYSTOLIC BLOOD PRESSURE: 122 MMHG | DIASTOLIC BLOOD PRESSURE: 84 MMHG

## 2020-05-18 DIAGNOSIS — Z53.9 ERRONEOUS ENCOUNTER--DISREGARD: Primary | ICD-10-CM

## 2020-05-18 DIAGNOSIS — R03.0 ELEVATED BLOOD PRESSURE READING WITHOUT DIAGNOSIS OF HYPERTENSION: Primary | ICD-10-CM

## 2020-05-18 NOTE — PROGRESS NOTES
Recheck bp per ANA :     BP Readings from Last 3 Encounters:   05/18/20 122/84   05/11/20 132/88   05/06/20 130/82

## 2020-05-26 ENCOUNTER — MYC MEDICAL ADVICE (OUTPATIENT)
Dept: FAMILY MEDICINE | Facility: CLINIC | Age: 25
End: 2020-05-26

## 2020-05-27 ENCOUNTER — VIRTUAL VISIT (OUTPATIENT)
Dept: FAMILY MEDICINE | Facility: CLINIC | Age: 25
End: 2020-05-27
Payer: COMMERCIAL

## 2020-05-27 DIAGNOSIS — N64.4 BREAST PAIN, LEFT: Primary | ICD-10-CM

## 2020-05-27 DIAGNOSIS — N61.0 MASTITIS: ICD-10-CM

## 2020-05-27 PROCEDURE — 99213 OFFICE O/P EST LOW 20 MIN: CPT | Mod: 95 | Performed by: NURSE PRACTITIONER

## 2020-05-27 RX ORDER — DICLOXACILLIN SODIUM 500 MG
500 CAPSULE ORAL 4 TIMES DAILY
Qty: 40 CAPSULE | Refills: 0 | Status: SHIPPED | OUTPATIENT
Start: 2020-05-27 | End: 2020-06-22

## 2020-05-27 NOTE — PROGRESS NOTES
"Micki Thorpe is a 24 year old female who is being evaluated via a billable video visit.      The patient has been notified of following:     \"This video visit will be conducted via a call between you and your physician/provider. We have found that certain health care needs can be provided without the need for an in-person physical exam.  This service lets us provide the care you need with a video conversation.  If a prescription is necessary we can send it directly to your pharmacy.  If lab work is needed we can place an order for that and you can then stop by our lab to have the test done at a later time.    Video visits are billed at different rates depending on your insurance coverage.  Please reach out to your insurance provider with any questions.    If during the course of the call the physician/provider feels a video visit is not appropriate, you will not be charged for this service.\"    Patient has given verbal consent for Video visit? Yes    How would you like to obtain your AVS?     Patient would like the video invitation sent by: Text to cell phone: [phone]    Will anyone else be joining your video visit? No      Subjective     Micki Thorpe is a 24 year old female who presents today via video visit for the following health issues:    HPI  Left Breat pain       Duration: x 2 days    Description (location/character/radiation): left breast pain     Intensity:  moderate    Accompanying signs and symptoms: Fever Chills body aches    History (similar episodes/previous evaluation): Yes    Precipitating or alleviating factors: Pt is 4 weeks post partum and is breastfeeding    Therapies tried and outcome: Aleve ,pumping , heat      Video Start Time: 2:49 PM    Reviewed and updated as needed this visit by Provider  Tobacco  Allergies  Meds  Problems  Med Hx  Surg Hx  Fam Hx       Review of Systems   Constitutional, HEENT, cardiovascular, pulmonary, GI, , musculoskeletal, neuro, skin, endocrine " "and psych systems are negative, except as otherwise noted in the HPI.        Objective    LMP 07/28/2019 (Exact Date)   Estimated body mass index is 30.34 kg/m  as calculated from the following:    Height as of 4/22/20: 1.676 m (5' 6\").    Weight as of 5/11/20: 85.3 kg (188 lb).  Physical Exam     GENERAL: Healthy, alert and no distress  EYES: Eyes grossly normal to inspection.  No discharge or erythema, or obvious scleral/conjunctival abnormalities.  RESP: No audible wheeze, cough, or visible cyanosis.  No visible retractions or increased work of breathing.    BREAST: normal without masses or nipple discharge, mild erythema left breast 6 o'clock position and area of tenderness left breast 2 o'clock and 6 o'clock positions.    SKIN: Visible skin clear. No significant rash, abnormal pigmentation or lesions.  NEURO: Cranial nerves grossly intact.  Mentation and speech appropriate for age.  PSYCH: Mentation appears normal, affect normal/bright, judgement and insight intact, normal speech and appearance well-groomed.          Assessment & Plan     Micki was seen today for breast pain.    Diagnoses and all orders for this visit:    Breast pain, left  Mastitis  Exam and symptoms consistent with mastitis in breastfeeding mother.   Antibiotics.  Care discussed.   Written education provided.  Red flag symptoms discussed and if these occur present to the emergency room or call 911.  Follow up in 2 days for in person evaluation.   Micki verbalizes understanding of plan of care and is in agreement.   -     dicloxacillin (DYNAPEN) 500 MG capsule; Take 1 capsule (500 mg) by mouth 4 times daily for 10 days       BMI:   Estimated body mass index is 30.34 kg/m  as calculated from the following:    Height as of 4/22/20: 1.676 m (5' 6\").    Weight as of 5/11/20: 85.3 kg (188 lb).       Video-Visit Details    Type of service:  Video Visit    Video End Time:2:55 PM    Originating Location (pt. Location): Home    Distant Location " (provider location):  [facility]     Platform used for Video Visit: Doximity    Return in about 2 days (around 5/29/2020).         Ignacia Watters, ASYAP-BC

## 2020-05-27 NOTE — TELEPHONE ENCOUNTER
Please schedule pt for VV today       Attempt # 1    Called #   Telephone Information:   Mobile [phone]         Left a non detailed VM     My chart message sent         Hannah Dewitt RN, BSN  [facility]

## 2020-05-27 NOTE — PATIENT INSTRUCTIONS
Patient Education     Mastitis  Mastitis occurs when breast tissue becomes swollen and inflamed. This is almost always due to infection. Mastitis most often affects breastfeeding women during the first 6 weeks after childbirth. For this reason, it s also known as lactation mastitis. Infection may happen after a duct becomes clogged, causing milk to back up in the breast. Mastitis may also occur if bacteria enter the breast through small cracks in the nipple. (Less often, mastitis occurs in women who aren t breastfeeding. If you have mastitis that is not due to breastfeeding, your healthcare provider will give you more information as needed. Treatment may include some of the same home care measures listed below.)  Mastitis may cause flu-like symptoms such as fever, aches, and fatigue. The affected breast may feel painful, warm, tender, firm, or swollen. The skin over the breast may be red (often in a wedge-shaped pattern). You may feel a burning a sensation when breastfeeding.  In most cases, mastitis can be treated with antibiotics. This should clear the infection. If treatment is delayed, a pocket of pus (abscess) can form in the breast tissue. A procedure may then be needed to drain the pus. In severe cases of infection, other treatments may be needed.  Home care  Breastfeeding    It s very important to keep the milk flowing from the infected breast. Continue breastfeeding from both breasts as usual. This will not hurt the baby. If this is too painful, use a breast pump to remove milk from the infected side. This can be fed to your baby or discarded. Note: If you don't continue to breastfeed or pump your breast, bacteria can grow in the milk that is left in your breast. This can make your infection worse.    Tell your healthcare provider if you have problems with breastfeeding. He or she may suggest changes to your technique, if needed. You may also be referred to a lactation nurse or consultant for support with  breastfeeding.  General care    Take any medicines you re prescribed as directed. If you re taking antibiotics, be sure to complete all of the medicine even if you start to feel better. Over-the-counter pain medicines may also be recommended. Don t use breast creams or other products or medicines without talking to your healthcare provider first. Note: If you re concerned about taking medicines while breastfeeding, talk to your healthcare provider.    Rest as often as needed. Also be sure to drink plenty of fluids.    To help relieve pain and swelling, heat or ice may be used. Apply as often as directed by your provider.  ? Heat: Place a warm compress on the breast. Use a towel soaked in hot water, a heating pad, or a hot water bottle.  ? Cold: Place a cold compress on the breast. Use an ice pack or bag of ice wrapped in a thin towel. Never place a cold source directly on the skin.    Follow-up care  Follow up with your healthcare provider as advised.  When to seek medical advice  Call your healthcare provider right away if any of these occur:    Fever of 100.4 F (38 C) or higher, or as directed by your provider    Shaking chills    Worsening symptoms or symptoms that don't improve within 48 to 72 hours of starting treatment    New symptoms develop  Date Last Reviewed: 6/1/2018 2000-2019 [employer]. [address] [place] [address], [place] [address]. All rights reserved. This information is not intended as a substitute for professional medical care. Always follow your healthcare professional's instructions.

## 2020-05-28 NOTE — PROGRESS NOTES
Called # [phone]     Pt stated she is feeling much Improved today. Pt is still having slight tenderness.     Neil Woodall RN   [facility] [place]

## 2020-05-28 NOTE — PROGRESS NOTES
Left breast mastitis.   Please call and check on patient today; she also has follow up tomorrow with Dr. WALTERS       Thanks,      Ignacia Watters, MARLEE-BC

## 2020-05-28 NOTE — TELEPHONE ENCOUNTER
Pt seen yesterday and has follow up with PCP   Next 5 appointments (look out 90 days)    May 29, 2020 11:00 AM CDT  Office Visit with Ashwini Garcia MD  [facility] [address] [place] [id number] [phone]        Neil Woodall RN   [facility] [place] [facility]

## 2020-05-28 NOTE — PROGRESS NOTES
SUBJECTIVE:                                                    Micki Thorpe is a 24 year old female who presents to clinic today for the following health issues:    Concern - BREAST PAIN:   Onset: 5 days- started on dicloxacillin for mastititis on 5/27/2020 - No fevers, chills or sweats since then.  No problems or side effects from the medication.     Description:   Left breast pain    Intensity: 7/10    Progression of Symptoms:  improving        Alleviating factors:  Improved by: is on antibiotic for mastitis    Therapies Tried and outcome: see above. Doing heat and taking baths as well.  Delivery date was 5/2/2020 and is exclusively breastfeeding and pumping - not supplementing with formula at all.  Only hurts when she touches the inferior left breast.   No signif pain with feeding from the breast.      Patient Active Problem List   Diagnosis     Blood type AB+ - no need for rhogam     Encounter for supervision of other normal pregnancy, third trimester     CARDIOVASCULAR SCREENING; LDL GOAL LESS THAN 160     Unplanned wanted pregnancy - x 2- both on ocp's - 2nd on micronor      Cervical high risk HPV (human papillomavirus) test positive     Anxiety     Irritability and anger     Dysplastic nevus of trunk - Compound dysplastic nevus with moderate to severe atypia s/p MOHS procedure - Dr. Clay      Benign paroxysmal positional vertigo of right ear     Strain of neck muscle, initial encounter     Uterine size date discrepancy pregnancy, second trimester- measuring 28cm at 22.5wks EGA      Indication for care in labor or delivery     Encounter for elective induction of labor     Elevated blood pressure reading without diagnosis of hypertension       Current Outpatient Medications   Medication Sig Dispense Refill     acetaminophen (TYLENOL) 325 MG tablet Take 2 tablets (650 mg) by mouth every 4 hours as needed for mild pain or fever (greater than or equal to 38  C /100.4  F (oral) or 38.5  C/ 101.4  F (core).)        dicloxacillin (DYNAPEN) 500 MG capsule Take 1 capsule (500 mg) by mouth 4 times daily for 10 days 40 capsule 0     ibuprofen (ADVIL/MOTRIN) 800 MG tablet Take 1 tablet (800 mg) by mouth every 8 hours as needed for other (cramping) 24 tablet 0     order for DME Equipment being ordered: breast pump and all necessary accessories including tubing, valves, connectors etc. 1 Device 0     Prenatal Vit-Fe Fumarate-FA (PRENATAL VITAMIN) 27-0.8 MG TABS Take 1 tablet by mouth daily       lanolin ointment Apply topically every hour as needed for dry skin (sore nipples)  0        No Known Allergies         Problem list and histories reviewed & adjusted, as indicated.  Additional history: as documented    Reviewed and updated as needed this visit by clinical staff  Tobacco  Allergies  Meds  Problems  Med Hx  Surg Hx  Fam Hx       Reviewed and updated as needed this visit by Provider         ROS:   ROS: 12 point ROS neg other than the symptoms noted above    OBJECTIVE:                                                    /80   Pulse 110   Temp 98.8  F (37.1  C)   Wt 83.5 kg (184 lb)   LMP 07/28/2019 (Exact Date)   SpO2 98%   BMI 29.70 kg/m    Body mass index is 29.7 kg/m .   GENERAL: healthy, alert, well nourished, well hydrated, no distress  HENT: ear canals- normal; TMs- normal; Nose- normal; Mouth- no ulcers, no lesions  NECK: no tenderness, no adenopathy, no asymmetry, no masses, no stiffness; thyroid- normal to palpation  RESP: lungs clear to auscultation - no rales, no rhonchi, no wheezes  BREAST: no masses, no tenderness, no nipple discharge, no palpable axillary masses or adenopathy  CV: regular rates and rhythm, normal S1 S2, no S3 or S4 and no murmur, no click or rub -  ABDOMEN: soft, no tenderness, no  hepatosplenomegaly, no masses, normal bowel sounds  MS: extremities- no gross deformities noted, no edema    Diagnostic test results:  Diagnostic Test Results:  Labs reviewed in Epic      ASSESSMENT/PLAN:                                                        ICD-10-CM    1. Mastitis, obstetric, postpartum condition  O91.22      The patient is advised to apply heat intermittently (avoid sleeping on heating pad). Finish off full course of antibiotics.  try eat one yogurt ( with active cultures) daily or oral probiotic otc such as Culturelle, Align. IbSium, or UltraFlora  Intensive Care  to help restore your natural gut bacteria to hopefully prevent yeast infections and/or diarrhea sometimes assoc. with this antibiotic.       Return in 24 days (on 6/22/2020) for for 6 weeks postpartum exam..     See Patient Instructions         Ashwini Garcia MD    [facility]

## 2020-05-29 ENCOUNTER — OFFICE VISIT (OUTPATIENT)
Dept: FAMILY MEDICINE | Facility: CLINIC | Age: 25
End: 2020-05-29
Payer: COMMERCIAL

## 2020-05-29 VITALS
TEMPERATURE: 98.8 F | BODY MASS INDEX: 29.7 KG/M2 | WEIGHT: 184 LBS | HEART RATE: 110 BPM | DIASTOLIC BLOOD PRESSURE: 80 MMHG | SYSTOLIC BLOOD PRESSURE: 115 MMHG | OXYGEN SATURATION: 98 %

## 2020-05-29 PROCEDURE — 99213 OFFICE O/P EST LOW 20 MIN: CPT | Performed by: FAMILY MEDICINE

## 2020-05-29 NOTE — PATIENT INSTRUCTIONS
.The patient is advised to apply heat intermittently (avoid sleeping on heating pad). Finish off full course of antibiotics.  try eat one yogurt ( with active cultures) daily or oral probiotic otc such as Culturelle, Align. IbSium, or UltraFlora  Intensive Care  to help restore your natural gut bacteria to hopefully prevent yeast infections and/or diarrhea sometimes assoc. with this antibiotic.       Return in 24 days (on 6/22/2020) for for 6 weeks postpartum exam..     Thank you so much or choosing [facility]  for your Health Care. It was a pleasure seeing you at your visit today! Please contact us with any questions or concerns you may have.                   Ashwini Garcia MD                              To reach your [facility] care team after hours call:   [phone]    PLEASE NOTE OUR HOURS HAVE CHANGED secondary to COVID-19 coronavirus pandemic, as we are trying to minimize patient exposure to the virus,  which is now widespread in the state.  These hours may change with very little notice.  We apologize for any inconvenience.       Our current clinic hours are:    Monday- Friday  9:00am - 4:00pm                              Saturday and Sunday : Closed to in person visits      We have telephone and virtual visit times available between 7:40am - 6pm on Mondays.                                                      And 7:40am - 5pm Tuesdays through Fridays.                  Phone:  [phone]    Our pharmacy hours:   Monday  9:00 am to 6:00 pm      Tuesday through Friday 9:00am to 5:00pm                        Saturday - 9:00 am to 12 noon       Sunday : Closed.              Phone:  [phone]      There is also information available at our web site:  [email or web address]    If your provider ordered any lab tests and you do not receive the results within 10 business days, please call the clinic.    If you need a medication refill please  contact your pharmacy.  Please allow 2 business days for your refill to be completed.    Our clinic offers telephone visits and e visits.  Please ask one of your team members to explain more.      Use [employer] (secure email communication and access to your chart) to send your primary care provider a message or make an appointment. Ask someone on your Team how to sign up for [employer].

## 2020-06-22 ENCOUNTER — OFFICE VISIT (OUTPATIENT)
Dept: FAMILY MEDICINE | Facility: CLINIC | Age: 25
End: 2020-06-22
Payer: COMMERCIAL

## 2020-06-22 VITALS
HEIGHT: 66 IN | OXYGEN SATURATION: 96 % | HEART RATE: 92 BPM | BODY MASS INDEX: 30.22 KG/M2 | WEIGHT: 188 LBS | DIASTOLIC BLOOD PRESSURE: 82 MMHG | SYSTOLIC BLOOD PRESSURE: 128 MMHG | TEMPERATURE: 98.1 F

## 2020-06-22 DIAGNOSIS — Z30.011 ENCOUNTER FOR INITIAL PRESCRIPTION OF CONTRACEPTIVE PILLS: ICD-10-CM

## 2020-06-22 DIAGNOSIS — D48.5 NEOPLASM OF UNCERTAIN BEHAVIOR OF SKIN: ICD-10-CM

## 2020-06-22 DIAGNOSIS — Z12.4 SCREENING FOR MALIGNANT NEOPLASM OF CERVIX: ICD-10-CM

## 2020-06-22 PROBLEM — O26.842 UTERINE SIZE DATE DISCREPANCY PREGNANCY, SECOND TRIMESTER: Status: RESOLVED | Noted: 2020-01-03 | Resolved: 2020-06-22

## 2020-06-22 LAB — HGB BLD-MCNC: 12.8 G/DL (ref 11.7–15.7)

## 2020-06-22 PROCEDURE — 99207 ZZC POST PARTUM EXAM: CPT | Performed by: FAMILY MEDICINE

## 2020-06-22 PROCEDURE — 36415 COLL VENOUS BLD VENIPUNCTURE: CPT | Performed by: FAMILY MEDICINE

## 2020-06-22 PROCEDURE — G0145 SCR C/V CYTO,THINLAYER,RESCR: HCPCS | Performed by: FAMILY MEDICINE

## 2020-06-22 PROCEDURE — G0476 HPV COMBO ASSAY CA SCREEN: HCPCS | Performed by: FAMILY MEDICINE

## 2020-06-22 PROCEDURE — 00000218 ZZHCL STATISTIC OBHBG - HEMOGLOBIN: Performed by: FAMILY MEDICINE

## 2020-06-22 RX ORDER — ACETAMINOPHEN AND CODEINE PHOSPHATE 120; 12 MG/5ML; MG/5ML
0.35 SOLUTION ORAL DAILY
Qty: 84 TABLET | Refills: 3 | Status: SHIPPED | OUTPATIENT
Start: 2020-06-22 | End: 2020-08-03

## 2020-06-22 ASSESSMENT — MIFFLIN-ST. JEOR: SCORE: 1614.51

## 2020-06-22 ASSESSMENT — ANXIETY QUESTIONNAIRES
3. WORRYING TOO MUCH ABOUT DIFFERENT THINGS: NOT AT ALL
6. BECOMING EASILY ANNOYED OR IRRITABLE: SEVERAL DAYS
1. FEELING NERVOUS, ANXIOUS, OR ON EDGE: NOT AT ALL
GAD7 TOTAL SCORE: 1
IF YOU CHECKED OFF ANY PROBLEMS ON THIS QUESTIONNAIRE, HOW DIFFICULT HAVE THESE PROBLEMS MADE IT FOR YOU TO DO YOUR WORK, TAKE CARE OF THINGS AT HOME, OR GET ALONG WITH OTHER PEOPLE: NOT DIFFICULT AT ALL
7. FEELING AFRAID AS IF SOMETHING AWFUL MIGHT HAPPEN: NOT AT ALL
5. BEING SO RESTLESS THAT IT IS HARD TO SIT STILL: NOT AT ALL
2. NOT BEING ABLE TO STOP OR CONTROL WORRYING: NOT AT ALL

## 2020-06-22 ASSESSMENT — PATIENT HEALTH QUESTIONNAIRE - PHQ9
SUM OF ALL RESPONSES TO PHQ QUESTIONS 1-9: 1
5. POOR APPETITE OR OVEREATING: NOT AT ALL

## 2020-06-22 NOTE — PATIENT INSTRUCTIONS
Common Problems of New Mothers  Most mothers-to-be focus on the birth of their child. It is only after the birth that you begin to realize that the birth was just the beginning.  Mental and emotional preparation is just as important as preparing the layette.  Fatigue  Delivering a baby has been compared to running a marathon. Combine that with the change in sleep habits that night feedings require and you can understand why new mothers are exhausted. Anxiety over being a new parent adds to the tiredness.  Hints to help manage tiredness:  Expect to be tired, and don't be upset with yourself about it.   Nap when the baby naps.   Try to sleep at least 1 and 1/2 or 2 hours during the day for the first 2 to 3 weeks. Ask your , a friend, or relative to take care of the baby during this time.   You may want to nurse the baby in bed during night feedings.   If you are bottle feeding, share night feedings with your spouse.  Appearance  As a new mother, don't expect miracles. It took you 9 months of pregnancy to gain weight, and it will take time to get your body back into shape. One of the biggest challenges of early motherhood may be just having the time and energy to brush your hair once a day!  Hints on appearance:  Get a haircut that is easy to care for.   Start exercising as soon as your healthcare provider gives the OK. Walk with your baby around your house, yard, or the neighborhood as often as you can. Being more physically active will help you lose weight, and walking can also help calm a fussy baby.   When you are physically ready, joining an exercise or aerobics class will get you out of the house and keep you motivated to exercise. If you go back to work, park as far away from entrances as you can and use stairs instead of elevators.   Buy some new clothes as a reward for a successful start in life as a mother and for starting regular exercise. But wait until your figure has shrunk a bit  from the exercise!  Lack of Confidence  It may take some time to get used to your new baby. Even if this is not your first baby, you soon learn that all babies are different. Give yourself time to get to know your  and don t expect to get it perfect every time. Some babies are fussy, some are colicky, some develop allergies and most won t sleep through the night for many months. Babies and parenting are unpredictable. Stay flexible and have a good sense of humor. If you expect too much, it can lead to a feeling a failure.  Pain from a  Section    birth complicates the healing process and requires more rest and recuperation.  Hints for recovering from a  birth:  Use the time in the hospital to rest. You may need to control the number of phone calls and visitors.   Make sure there is some kind of help available when you come home for at least the first 2 weeks. The more rest you get during that time, the faster you will heal.   Until your incision heals, make sure you lift your baby slowly, keeping your arms close to your body, so that you put minimal strain on your stomach muscles.  Pain from an Episiotomy   An episiotomy is an incision often made during birth to give more room for the baby to pass through the birth canal. It usually heals within 7 to 10 days and with no complications.  Hints for recovering from an episiotomy:  Warm sitz baths and heat lamps (at a safe distance) can provide some comfort.   You can prevent some of the pain by tightening your buttocks before you sit down.   Avoid straining when you have a bowel movement.  Postpartum Depression  After childbirth, many mothers feel more emotional. The hormones your body made when your were pregnant, a lack of sleep, pain from childbirth, changing eating habits and change in appearance can all lead to the baby blues. You may feel sad, afraid, or angry. For most women these baby blues are mild and go away within a week.  Postpartum depression lasts longer and is more severe.  If you feel you can't, or don t want to, take care of your baby, or if you have thoughts of hurting yourself or the baby, get help. Do not try to overcome postpartum depression by yourself. It can be successfully treated with either therapy or antidepressant medicine or both.  Hints for dealing with post partum depression:  Find someone you trust to talk about how you are feeling. Other new mothers are a good support system.   Get someone to watch the baby and do something to pamper yourself. Get a massage, get a pedicure or just take a long nap. Take time to focus on yourself and not just on the baby.   Try to return to some of the things you enjoyed doing before the baby was born. It's important to know that even though you're a mom now, you still have your own interests.   Try infant massage. Spending quiet time with your baby not only can relax your baby but can relax you as well.  Don't try to be supermom. Give yourself time to adjust to being a mother. Listen to your body and enjoy your new baby.    Published by [employer].  This content is reviewed periodically and is subject to change as new health information becomes available. The information is intended to inform and educate and is not a replacement for medical evaluation, advice, diagnosis or treatment by a healthcare professional.  Written by Verito Newton.    2011 [facility] and/or its affiliates. All rights reserved.                 Postpartum Depression  What is postpartum depression?   After childbirth, many mothers feel more emotional. They may feel sad, afraid, or angry. This is called postpartum blues or the baby blues. For most women these postpartum blues are mild and go away within a week. Postpartum depression lasts longer and is more severe. About 10 to 20% of women, especially very young mothers, have the more severe form.   How does it occur?   You may have postpartum depression  within a few days to a few weeks after giving birth or having a miscarriage. For about 60% of women, it is their first episode of depression. While hormone changes after giving birth seem to play a part, the full causes are not known. Risk factors that increase your chances of getting postpartum depression are:   having been depressed sometime before you got pregnant   having been depressed after a previous pregnancy   having family members who were depressed, especially after a pregnancy   returning home with your baby to a very stressful home or relationship   having a baby with health problems or a baby who cries often   having a miscarriage late in pregnancy or a stillbirth   If your pregnancy was unwanted you are also at risk for post partum depression   What are the symptoms?   Besides feeling sad and uninterested in activities, you may also:   feel unable or unwilling to care for your baby   think often about bad things that could happen to your baby or feel like hurting your baby   be irritable   have trouble falling asleep, wake up very early, or sleep too much   feel overwhelmed by everyday activities such as taking a shower or doing laundry   have little appetite or eat too much   be tired and low in energy   have low sexual desire and function   feel worthless and guilty   have trouble concentrating or remembering things   feel hopeless or just do not care about anything   have unexplained pain in your back or abdomen, or get headaches   worry that you will never feel better   Some women also become anxious, have hallucinations, or delusions. If you have hallucinations (hear voices or see things not present) or delusions (thoughts not grounded in reality) this is called postpartum psychosis. This is a medical emergency. Get help right away.   How is it diagnosed?   Your healthcare provider or a mental health professional can tell you if your symptoms are postpartum depression. He or she will ask about  your symptoms and any drug or alcohol use. You may be tested to rule out medical problems such as hormone imbalances.   How is it treated?   Do not try to deal with postpartum depression by yourself. It can be successfully treated with either psychotherapy or antidepressant medicine or both. Discuss this with your healthcare provider or therapist.   Medicine  Several types of medicines can help treat postpartum depression. Discuss the use of medicines with your healthcare provider if you are breast-feeding. Your healthcare provider will carefully select a medicine for you.   You must take antidepressant medicines daily to get full benefit from them. It is recommended that you keep taking the medicine for at least 6 months.   Psychotherapy  Seeing a mental health therapist is helpful. Therapy may last a short time or may need to go on for many months. Cognitive behavioral therapy (CBT) is a way to help you identify and change thought processes that lead to depression. Replacing negative thoughts with more positive ones can help your depression.   Claims have been made that certain herbal and dietary products help control depression symptoms. Omega-3 fatty acids may help to reduce symptoms of depression.  No herb or dietary supplement has been proven to consistently or completely relieve postpartum depression. Supplements are not tested or standardized and may vary in strengths and effects. They may have side effects and are not always safe.   Learning ways to relax may help. Yoga and meditation may also be helpful. You may want to talk with your healthcare provider about using these methods along with medicines and psychotherapy.   Support  Ask for help with night time feedings so that you can sleep. You may also find it useful to get help with household chores. Take time for yourself without your baby. Hire a sitter, leave your baby with a close friend or your spouse, and get out. Spend time with support groups and  friends, and don't be afraid to share both your fears and your joys.   How long will the effects last?   In most cases postpartum depression slowly goes away in the first 9 months after birth. For a few women it lasts for more than 1 year. Treatment helps speed the recovery.   What can I do to help myself or my loved one?   Maintaining a healthy lifestyle is crucial. Staying physically and socially active, especially with your partner, is very important. Having regular sleep and eating patterns will also help you. Since you will need to be up during the night with your baby during the first few months, it is important to take naps to keep your energy up.   Certain medicines such as benzodiazepines and levofloxacin (Levaquin) can add to the symptoms of depression. It is important to check with your healthcare provider before taking any new prescription or nonprescription medicines.   To help prevent postpartum depression:   Exercise as appropriate for your physical condition in the days right after giving birth.   Participate in activities with your significant other and baby.   Talk to your family and friends.   Ask for support.   Avoid alcohol and caffeine.   Eat a healthy diet.   Develop a regular sleep and nap pattern.   Learn ways to lower stress, such as breathing and muscle relaxation exercises.   When should I seek help?   Do not try to overcome postpartum depression by yourself. Seek professional help if you believe that you or a loved one has the symptoms described here.   Get emergency care if you or a loved one has serious thoughts of suicide or harming your baby, or if you hear voices or see things not present, or have delusions (thoughts not grounded in reality).     Published by [employer].  This content is reviewed periodically and is subject to change as new health information becomes available. The information is intended to inform and educate and is not a replacement for medical evaluation,  advice, diagnosis or treatment by a healthcare professional.   Written by Estela Cabrera, PhD, for [employer].   ?  [facility] and/or its affiliates. All Rights Reserved.   Copyright   [employer] 2011  Adult Health Advisor                    Exercise After Delivery  What are the benefits of a postpartum exercise program?   Now that your baby is here, you may want to get rid of added pregnancy pounds and get back into shape. Along with losing weight, an exercise program can help you:   Reduce stress.   Tighten stretched abdominal and pelvic muscles.   Give you more energy.   Lessen the feelings of depression that can happen after childbirth.   Prepare you for the physical demands of parenthood.   When can I start exercising?   It can take up to 1 year to recover from the changes that happen during pregnancy and childbirth. Once you have received the OK from your healthcare provider AND you feel ready, you can begin a gentle exercise program. Walking and gentle stretching and strengthening exercises are the best exercises to start with. You should avoid any rigorous exercise such as running or jumping for at least 6 weeks after the birth of your baby. If you had a , you might also need to wait 6 weeks before you begin any abdominal strengthening exercises.   What exercises should I do?   Walking is one of the best exercises to start with because it is gentle, you do not need special equipment, and you can bring your baby with you. Begin with 15 minutes of walking at least 3 times a week. Try to increase this time 5 minutes each week. Once you are up to walking continuously for 45 minutes, increase the intensity of your workout by walking faster or walking up hills. After 6 weeks you may be able to begin a jogging program if that is your goal.   Bicycling and swimming are also good choices. Yoga and Pilates classes for new mothers can also be helpful. Usually these can be started 1 to 2  weeks after a vaginal delivery.   When your healthcare provider gives you the okay, you can begin doing exercises to strengthen your abdominal muscles.   Kegel exercises can help strengthen the muscles of your pelvic floor. The pelvic floor muscles help support the urethra, bladder, vagina, uterus, and rectum. They are used when you urinate, have bowel movements and during sex. Your healthcare provider can teach you how to do Kegel exercises.   How often should I exercise?   When you exercise, listen to your body. Don't push yourself too hard or too fast. Try to exercise at least 3 days each week, with a goal of 5 days a week. If you have to, exercise for short periods of time during the day. Two 15-minute sessions can be just as good as one 30-minute workout.   How can I make the most of my exercise program?   Warm up and cool down with light stretches before and after your workout.   Drink plenty of water before and after you exercise to avoid getting dehydrated.   Try to eat a healthy diet to keep your energy level up.   Nurse your baby or pump before exercising if you are breastfeeding.   Wear a sports bra that fits properly.   Make sure that your exercises are enjoyable, not stressful   Remember to be patient. It may take several months before you are as fit as you were before your pregnancy.   If you have any increased pain, bleeding, or dizziness, stop exercising right away and contact your healthcare provider.     Published by [employer].  This content is reviewed periodically and is subject to change as new health information becomes available. The information is intended to inform and educate and is not a replacement for medical evaluation, advice, diagnosis or treatment by a healthcare professional.   Written by Esther Cervantes PT, [facility], OCS.   ? 2010 [employer] and/or its affiliates. All Rights Reserved.   Copyright   [employer] 2011               Thank you for choosing [place]  [facility]  for your Health Care. It was a pleasure seeing you at your visit today. Please contact us with any questions or concerns you may have.                   Ashwini Garcia MD                                  To reach your [facility] care team after hours call:   [phone]    Our clinic hours are:     Monday- 7:30 am - 7:00 pm                             Tuesday through Friday- 7:30 am - 5:00 pm                                        Saturday- 8:00 am - 12:00 pm                  Phone:  [phone]    Our pharmacy hours are:     Monday  8:00 am to 7:00 pm      Tuesday through Friday 8:00am to 6:00pm                        Saturday - 9:00 am to 1:00 pm      Sunday : Closed.              Phone:  [phone]      There is also information available at our web site:  [email or web address]    If your provider ordered any lab tests and you do not receive the results within 10 business days, please call the clinic.    If you need a medication refill please contact your pharmacy.  Please allow 2 business days for your refill to be completed.    Our clinic offers telephone visits and e visits.  Please ask one of your team members to explain more.      Use [employer] (secure email communication and access to your chart) to send your primary care provider a message or make an appointment. Ask someone on your Team how to sign up for [employer].

## 2020-06-22 NOTE — PROGRESS NOTES
"  SUBJECTIVE:                                                      SUBJECTIVE:  Micki Thorpe is a 24 year old female  patient in today for a 6-week postpartum checkup.    Delivery date was 2020. She had a  of a viable girl, weight 7 pounds 13 oz., with no complications, except for mild blood pressure increase. Since delivery, she has been breast feeding.  She has no signs of infection, bleeding or other complications.    We discussed contraceptions and she has chosen mini pill / progesterone only pill.  She  has not had intercourse since delivery .   She is not pregnant.  Patient screened for postpartum depression and complaints are NEGATIVE and N/A. Screening has also been completed for intimate partner violence.    EXAM:    Vitals: B/P: 128/82, T: 98.1, P: 92, R: 14  188 lbs 0 oz = weight   5' 6\" = height    BMI: Body mass index is 30.34 kg/m .  Alert and oriented. No acute distress. Vitals as above.    HEENT:  PERRL, EOMI.  No scleral icterus or injection. TM's Clear. Nose: normal. Throat normal. Neck supple without lymphadenopathy.   Thyroid not palpable.  Lungs: Clear  Heart:  Regular rate and rhythm without murmur, gallop, or rub.  Breast exam: breasts are symmetric with no lumps, lesions, or masses palpated. No skin puckering or dimpling. No nipple discharge or irregularities noted.   Lymph: no cervical, supraclavicular, axillary, or inguinal lymphadenopathy.  Abd: Normal bowel sounds present, soft, Nontender, nondistended, no HSM, no masses  Extremities: No pedal edema  Pelvic exam: External genitalia: normal vulva, internal and external labia appearance, premenopausal/normal  hair distribution, and no lesions noted. .  Urethral meatus: normal  size and  location,no  lesions, or prolapse noted. Urethra no palpable  masses, tenderness, or scarring.  Bladder : slight  fullness, no masses, or tenderness.  Vagina: normal appearance, premenopausal appearance without  atrophy, mild bloody menstrual type "  discharge , no lesions noted, good pelvic support,   no cystocele, or rectocele.    Cervix : normal  appearance without redness, lesions, or discharge.  Uterus: nonpregnant in  size, smooth contour, retroverted position, mobile,difficult to feel secondary to body habitus,  without any  tenderness, normal consistency, descent, and pelvic support.    Adnexa/parametria: no masses, tenderness, organomegaly, or nodularity.   . Anus and perineum appear normal without lesions or rash.     Digital rectal examination : normal  sphincter tone, no hemorrhoids, and no rectal masses are noted.   Skin: there are 3 suspicious nevi on scalp - 1 raised and pale with glistening surface and 2 irregularly pigmented flat lesions on the right posterior parietal area.   Note: pt  declines any STD testing today  including gonorrhea/chlamydia testing.             ASSESSMENT:     ICD-10-CM    1. Routine postpartum follow-up  Z39.2 OB hemoglobin     HPV High Risk Types DNA Cervical     Pap imaged thin layer screen with HPV - recommended age 30 - 65 years (select HPV order below)     CANCELED: Pap imaged thin layer screen with HPV - recommended age 30 - 65 years (select HPV order below)   2. Encounter for initial prescription of contraceptive pills  Z30.011 norethindrone (MICRONOR) 0.35 MG tablet   3. Screening for malignant neoplasm of cervix  Z12.4 HPV High Risk Types DNA Cervical     Pap imaged thin layer screen with HPV - recommended age 30 - 65 years (select HPV order below)     CANCELED: Pap imaged thin layer screen with HPV - recommended age 30 - 65 years (select HPV order below)   4. Neoplasms of uncertain behavior of skin - right posterior scalp   D48.5 DERMATOLOGY REFERRAL      Normal postpartum exam after . Start ocp's in 2 ore days - day #5 of current menses.     Discussed suspicious appearance of scalp lesions.  Recommended pt be seen at our [facility] clinic in [place].      PLAN:  Return as needed or at time of  next expected pap, pelvic, or breast exam.    Please, call or return to clinic or go to the ER immediately if signs or symptoms worsen or fail to improve as anticipated.          Ashwini Garcia MD

## 2020-06-23 ASSESSMENT — ANXIETY QUESTIONNAIRES: GAD7 TOTAL SCORE: 1

## 2020-06-24 LAB
COPATH REPORT: NORMAL
PAP: NORMAL

## 2020-07-06 ENCOUNTER — VIRTUAL VISIT (OUTPATIENT)
Dept: FAMILY MEDICINE | Facility: OTHER | Age: 25
End: 2020-07-06

## 2020-07-06 NOTE — PROGRESS NOTES
"Date: 2020 12:04:28  Clinician: Kolby Ramirez  Clinician NPI: [id number]  Patient: Micki Thorpe  Patient : 1995  Patient Address: [address] [place] [address], [place] [address]  Patient Phone: [phone]  Visit Protocol: URI  Patient Summary:  Micki is a 24 year old ( : 1995 ) female who initiated a Visit for COVID-19 (Coronavirus) evaluation and screening. When asked the question \"Please sign me up to receive news, health information and promotions from [employer].\", Micki responded \"No\".    Micki states her symptoms started suddenly 3-4 days ago.   Her symptoms consist of rhinitis, a sore throat, facial pain or pressure, a cough, and nasal congestion. She is experiencing difficulty breathing due to nasal congestion but she is not short of breath.   Symptom details     Nasal secretions: The color of her mucus is clear.    Cough: Micki coughs a few times an hour and her cough is more bothersome at night. Phlegm comes into her throat when she coughs. She believes her cough is caused by post-nasal drip. The color of the phlegm is clear.     Sore throat: Micki reports having mild throat pain (1-3 on a 10 point pain scale), does not have exudate on her tonsils, and can swallow liquids. She is not sure if the lymph nodes in her neck are enlarged. A rash has not appeared on the skin since the sore throat started.     Facial pain or pressure: The facial pain or pressure feels worse when bending over or leaning forward.      Micki denies having wheezing, nausea, teeth pain, ageusia, diarrhea, vomiting, malaise, ear pain, headache, chills, myalgias, anosmia, and fever. She also denies having recent facial or sinus surgery in the past 60 days, taking antibiotic medication in the past month, and double sickening (worsening symptoms after initial improvement).   Precipitating events  Within the past week, Micki has not been exposed to someone with strep throat. She has not recently been exposed to someone with " influenza. Micki has been in close contact with the following high risk individuals: children under the age of 5.   Pertinent COVID-19 (Coronavirus) information  In the past 14 days, Micki has not worked in a congregate living setting.   She does not work or volunteer as healthcare worker or a  and does not work or volunteer in a healthcare facility.   Micki also has not lived in a congregate living setting in the past 14 days. She does not live with a healthcare worker.   Micki has not had a close contact with a laboratory-confirmed COVID-19 patient within 14 days of symptom onset.   Pertinent medical history  Micki does not get yeast infections when she takes antibiotics.   Micki does not need a return to work/school note.   Weight: 185 lbs   Micki does not smoke or use smokeless tobacco.   She denies pregnancy and is breastfeeding. She has menstruated in the past month.   Weight: 185 lbs    MEDICATIONS: Norlyda oral, ALLERGIES: NKDA  Clinician Response:  Dear Micki,   Your symptoms show that you may have coronavirus (COVID-19). This illness can cause fever, cough and trouble breathing. Many people get a mild case and get better on their own. Some people can get very sick.  What should I do?  We would like to test you for this virus.   1. Please call [phone] to schedule your visit. Explain that you were [facility] to have a COVID-19 test. Be ready to share your [facility] number.  The following will serve as your written order for this COVID Test, ordered by me, for the indication of suspected COVID [Z20.828]: The test will be ordered in Note, our electronic health record, after you are scheduled. It will show as ordered and authorized by Jose Antonio Rajput MD.  Order: COVID-19 (Coronavirus) PCR for SYMPTOMATIC testing from [facility].      2. When it's time for your COVID test:  Stay at least 6 feet away from others. (If someone will drive you to your test, stay in the backseat, as far away  "from the  as you can.)   Cover your mouth and nose with a mask, tissue or washcloth.  Go straight to the testing site. Don't make any stops on the way there or back.      3.Starting now: Stay home and away from others (self-isolate) until:   You've had no fever---and no medicine that reduces fever---for 3 full days (72 hours). And...   Your other symptoms have gotten better. For example, your cough or breathing has improved. And...   At least 10 days have passed since your symptoms started.       During this time, don't leave the house except for testing or medical care.   Stay in your own room, even for meals. Use your own bathroom if you can.   Stay away from others in your home. No hugging, kissing or shaking hands. No visitors.  Don't go to work, school or anywhere else.    Clean \"high touch\" surfaces often (doorknobs, counters, handles, etc.). Use a household cleaning spray or wipes. You'll find a full list of  on the EPA website: www.epa.gov/pesticide-registration/list-n-disinfectants-use-against-sars-cov-2.   Cover your mouth and nose with a mask, tissue or washcloth to avoid spreading germs.  Wash your hands and face often. Use soap and water.  Caregivers in these groups are at risk for severe illness due to COVID-19:  o People 65 years and older  o People who live in a nursing home or long-term care facility  o People with chronic disease (lung, heart, cancer, diabetes, kidney, liver, immunologic)  o People who have a weakened immune system, including those who:   Are in cancer treatment  Take medicine that weakens the immune system, such as corticosteroids  Had a bone marrow or organ transplant  Have an immune deficiency  Have poorly controlled HIV or AIDS  Are obese (body mass index of 40 or higher)  Smoke regularly   o Caregivers should wear gloves while washing dishes, handling laundry and cleaning bedrooms and bathrooms.  o Use caution when washing and drying laundry: Don't shake dirty " laundry, and use the warmest water setting that you can.  o For more tips, go to www.cdc.gov/coronavirus/2019-ncov/downloads/10Things.pdf.    4.Sign up for [facility] [employer]. We know it's scary to hear that you might have COVID-19. We want to track your symptoms to make sure you're okay over the next 2 weeks. Please look for an email from [employer]---this is a free, online program that we'll use to keep in touch. To sign up, follow the link in the email. Learn more at [email or web address]  How can I take care of myself?   Get lots of rest. Drink extra fluids (unless a doctor has told you not to).   Take Tylenol (acetaminophen) for fever or pain. If you have liver or kidney problems, ask your family doctor if it's okay to take Tylenol.   Adults can take either:    650 mg (two 325 mg pills) every 4 to 6 hours, or...   1,000 mg (two 500 mg pills) every 8 hours as needed.    Note: Don't take more than 3,000 mg in one day. Acetaminophen is found in many medicines (both prescribed and over-the-counter medicines). Read all labels to be sure you don't take too much.   For children, check the Tylenol bottle for the right dose. The dose is based on the child's age or weight.    If you have other health problems (like cancer, heart failure, an organ transplant or severe kidney disease): Call your specialty clinic if you don't feel better in the next 2 days.       Know when to call 911. Emergency warning signs include:    Trouble breathing or shortness of breath Pain or pressure in the chest that doesn't go away Feeling confused like you haven't felt before, or not being able to wake up Bluish-colored lips or face.  Where can I get more information?    [employer] [facility] -- About COVID-19: [email or web address]   CDC -- What to Do If You're Sick: www.cdc.gov/coronavirus/2019-ncov/about/steps-when-sick.html   CDC -- Ending Home Isolation: www.cdc.gov/coronavirus/2019-ncov/hcp/disposition-in-home-patients.html    CDC -- Caring for Someone: www.cdc.gov/coronavirus/2019-ncov/if-you-are-sick/care-for-someone.html   [facility] -- Interim Guidance for Hospital Discharge to Home: www.health.[facility].mn.us/diseases/coronavirus/hcp/hospdischarge.pdf   [facility] clinical trials (COVID-19 research studies): clinicalaffairs.[facility]-clinical-trials    Below are the COVID-19 hotlines at the [place] Department of Health ([facility]). Interpreters are available.    For health questions: Call 129-640-9935 or 1-493.497.7794 (7 a.m. to 7 p.m.) For questions about schools and childcare: Call 810-497-2234 or 1-574.349.7343 (7 a.m. to 7 p.m.)    Diagnosis: Cough  Diagnosis ICD: R05

## 2020-07-09 DIAGNOSIS — Z20.822 SUSPECTED COVID-19 VIRUS INFECTION: Primary | ICD-10-CM

## 2020-07-09 LAB
SARS-COV-2 RNA SPEC QL NAA+PROBE: NOT DETECTED
SPECIMEN SOURCE: NORMAL

## 2020-07-09 PROCEDURE — U0003 INFECTIOUS AGENT DETECTION BY NUCLEIC ACID (DNA OR RNA); SEVERE ACUTE RESPIRATORY SYNDROME CORONAVIRUS 2 (SARS-COV-2) (CORONAVIRUS DISEASE [COVID-19]), AMPLIFIED PROBE TECHNIQUE, MAKING USE OF HIGH THROUGHPUT TECHNOLOGIES AS DESCRIBED BY CMS-2020-01-R: HCPCS | Performed by: FAMILY MEDICINE

## 2020-07-09 PROCEDURE — 99207 ZZC NO BILLABLE SERVICE THIS VISIT: CPT

## 2020-07-09 NOTE — LETTER
July 14, 2020        Micki Thorpe  [address] [address]    This letter provides a written record that you were tested for COVID-19 on 7/9/20.       Your result was negative. This means that we didn t find the virus that causes COVID-19 in your sample. A test may show negative when you do actually have the virus. This can happen when the virus is in the early stages of infection, before you feel illness symptoms.    If you have symptoms   Stay home and away from others (self-isolate) until you meet ALL of the guidelines below:    You ve had no fever--and no medicine that reduces fever--for 3 full days (72 hours). And      Your other symptoms have gotten better. For example, your cough or breathing has improved. And     At least 10 days have passed since your symptoms started.    During this time:    Stay home. Don t go to work, school or anywhere else.     Stay in your own room, including for meals. Use your own bathroom if you can.    Stay away from others in your home. No hugging, kissing or shaking hands. No visitors.    Clean  high touch  surfaces often (doorknobs, counters, handles, etc.). Use a household cleaning spray or wipes. You can find a full list on the EPA website at www.epa.gov/pesticide-registration/list-n-disinfectants-use-against-sars-cov-2.    Cover your mouth and nose with a mask, tissue or washcloth to avoid spreading germs.    Wash your hands and face often with soap and water.    Going back to work  Check with your employer for any guidelines to follow for going back to work.    Employers: This document serves as formal notice that your employee tested negative for COVID-19, as of the testing date shown above.

## 2020-07-16 ENCOUNTER — OFFICE VISIT (OUTPATIENT)
Dept: FAMILY MEDICINE | Facility: CLINIC | Age: 25
End: 2020-07-16
Payer: COMMERCIAL

## 2020-07-16 VITALS — DIASTOLIC BLOOD PRESSURE: 84 MMHG | SYSTOLIC BLOOD PRESSURE: 122 MMHG

## 2020-07-16 DIAGNOSIS — D22.9 MULTIPLE BENIGN NEVI: ICD-10-CM

## 2020-07-16 DIAGNOSIS — D48.5 NEOPLASM OF UNCERTAIN BEHAVIOR OF SKIN: Primary | ICD-10-CM

## 2020-07-16 PROCEDURE — 99214 OFFICE O/P EST MOD 30 MIN: CPT | Mod: 25 | Performed by: PHYSICIAN ASSISTANT

## 2020-07-16 PROCEDURE — 11102 TANGNTL BX SKIN SINGLE LES: CPT | Performed by: PHYSICIAN ASSISTANT

## 2020-07-16 PROCEDURE — 88305 TISSUE EXAM BY PATHOLOGIST: CPT | Mod: TC | Performed by: PHYSICIAN ASSISTANT

## 2020-07-16 NOTE — PATIENT INSTRUCTIONS
Wound Care Instructions     FOR SUPERFICIAL WOUNDS     [facility] [phone]    [facility] [phone]          AFTER 24 HOURS YOU SHOULD REMOVE THE BANDAGE AND BEGIN DAILY DRESSING CHANGES AS FOLLOWS:     1) Remove Dressing.     2) Clean and dry the area with tap water using a Q-tip or sterile gauze pad.     3) Apply Vaseline, Aquaphor, Polysporin ointment or Bacitracin ointment over entire wound.  Do NOT use Neosporin ointment.     4) Cover the wound with a band-aid, or a sterile non-stick gauze pad and micropore paper tape      REPEAT THESE INSTRUCTIONS AT LEAST ONCE A DAY UNTIL THE WOUND HAS COMPLETELY HEALED.    It is an old wives tale that a wound heals better when it is exposed to air and allowed to dry out. The wound will heal faster with a better cosmetic result if it is kept moist with ointment and covered with a bandage.    **Do not let the wound dry out.**      Supplies Needed:      *Cotton tipped applicators (Q-tips)    *Polysporin Ointment or Bacitracin Ointment (NOT NEOSPORIN)    *Band-aids or non-stick gauze pads and micropore paper tape.      PATIENT INFORMATION:    During the healing process you will notice a number of changes. All wounds develop a small halo of redness surrounding the wound.  This means healing is occurring. Severe itching with extensive redness usually indicates sensitivity to the ointment or bandage tape used to dress the wound.  You should call our office if this develops.      Swelling  and/or discoloration around your surgical site is common, particularly when performed around the eye.    All wounds normally drain.  The larger the wound the more drainage there will be.  After 7-10 days, you will notice the wound beginning to shrink and new skin will begin to grow.  The wound is healed when you can see skin has formed over the entire area.  A healed wound has a healthy, shiny look to the surface and is red to dark pink in color to  normalize.  Wounds may take approximately 4-6 weeks to heal.  Larger wounds may take 6-8 weeks.  After the wound is healed you may discontinue dressing changes.    You may experience a sensation of tightness as your wound heals. This is normal and will gradually subside.    Your healed wound may be sensitive to temperature changes. This sensitivity improves with time, but if you re having a lot of discomfort, try to avoid temperature extremes.    Patients frequently experience itching after their wound appears to have healed because of the continue healing under the skin.  Plain Vaseline will help relieve the itching.        POSSIBLE COMPLICATIONS    BLEEDIN. Leave the bandage in place.  2. Use tightly rolled up gauze or a cloth to apply direct pressure over the bandage for 30  minutes.  3. Reapply pressure for an additional 30 minutes if necessary  4. Use additional gauze and tape to maintain pressure once the bleeding has stopped.

## 2020-07-16 NOTE — LETTER
2020         RE: Micki Thorpe  [address] [address]        Dear Colleague,    Thank you for referring your patient, Micki Thorpe, to the [facility]. Please see a copy of my visit note below.    HPI:  Micki Thorpe is a 24 year old female patient here today for changing mole on scap .  Patient states this has been present for a while.  Patient reports the following symptoms: changing color, tenderness in area .  Patient reports the following previous treatments: none.  Patient reports the following modifying factors: none.  Associated symptoms: none.  Patient has no other skin complaints today.  Remainder of the HPI, Meds, PMH, Allergies, FH, and SH was reviewed in chart.    Pertinent Hx:   No personal or family history of skin cancer. 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    Past Medical History:   Diagnosis Date     Blood type AB+      Cervical high risk HPV (human papillomavirus) test positive 2015    age20, normal pap     Depressive disorder 3/2017      (normal spontaneous vaginal delivery) 2014     Status post induction of labor for low ALBERT and post-dates 2014     Streptococcus infection in conditions classified elsewhere and of unspecified site, group A     h/o     Unplanned wanted pregnancy     x 2- both on ocp's - 2nd on micronor        Past Surgical History:   Procedure Laterality Date     APPENDECTOMY  3/09    dr francois     HC REMOVE TONSILS/ADENOIDS,<13 Y/O  10/01    T & A <11y.o.     MOHS MICROGRAPHIC PROCEDURE      left upper abd -Compound dysplastic nevus with moderate to severe atypia         Family History   Problem Relation Age of Onset     Lipids Mother      Lipids Father      Diabetes Paternal Grandmother      C.A.D. Maternal Grandfather      Lipids Maternal Grandfather      Diabetes Maternal Grandfather      Diabetes Maternal Grandmother      Lipids Maternal Grandmother      C.A.D. Maternal Grandmother       Cancer - colorectal Maternal Grandmother 73     Skin Cancer Maternal Aunt      Familial Atypical Mole-Malignant Melanoma Syndrome Paternal Cousin        Social History     Socioeconomic History     Marital status: Single     Spouse name: Not on file     Number of children: Not on file     Years of education: Not on file     Highest education level: Not on file   Occupational History     Occupation: postpartum       Employer: OTHER     Comment: laid off due to Covid-19 middle of March    Social Needs     Financial resource strain: Not on file     Food insecurity     Worry: Not on file     Inability: Not on file     Transportation needs     Medical: Not on file     Non-medical: Not on file   Tobacco Use     Smoking status: Never Smoker     Smokeless tobacco: Never Used   Substance and Sexual Activity     Alcohol use: Not Currently     Drug use: No     Sexual activity: Yes     Partners: Male   Lifestyle     Physical activity     Days per week: Not on file     Minutes per session: Not on file     Stress: Not on file   Relationships     Social connections     Talks on phone: Not on file     Gets together: Not on file     Attends Congregational service: Not on file     Active member of club or organization: Not on file     Attends meetings of clubs or organizations: Not on file     Relationship status: Not on file     Intimate partner violence     Fear of current or ex partner: Not on file     Emotionally abused: Not on file     Physically abused: Not on file     Forced sexual activity: Not on file   Other Topics Concern      Service Not Asked     Blood Transfusions Not Asked     Caffeine Concern Yes     Comment: occas     Occupational Exposure Not Asked     Hobby Hazards Not Asked     Sleep Concern Not Asked     Stress Concern Not Asked     Weight Concern Not Asked     Special Diet Not Asked     Back Care Not Asked     Exercise Yes     Bike Helmet Not Asked     Seat Belt Yes     Self-Exams Not Asked      Parent/sibling w/ CABG, MI or angioplasty before 65F 55M? No   Social History Narrative     Not on file       Outpatient Encounter Medications as of 7/16/2020   Medication Sig Dispense Refill     norethindrone (MICRONOR) 0.35 MG tablet Take 1 tablet (0.35 mg) by mouth daily 84 tablet 3     Prenatal Vit-Fe Fumarate-FA (PRENATAL VITAMIN) 27-0.8 MG TABS Take 1 tablet by mouth daily       No facility-administered encounter medications on file as of 7/16/2020.        Review Of Systems:  Skin: spot on scalp  Eyes: negative  Ears/Nose/Throat: negative  Respiratory: No shortness of breath, dyspnea on exertion, cough, or hemoptysis  Cardiovascular: negative  Gastrointestinal: negative  Genitourinary: negative  Musculoskeletal: negative  Neurologic: negative  Psychiatric: negative  Hematologic/Lymphatic/Immunologic: negative  Endocrine: negative      Objective:     /84   LMP 06/19/2020   Breastfeeding Yes   Eyes: Conjunctivae/lids: Normal   ENT: Lips:  Normal  MSK: Normal  Cardiovascular: Peripheral edema none  Pulm: Breathing Normal  Neuro/Psych: Orientation: A/O x 3 Normal; Mood/Affect: Normal, NAD, WDWN  Pt accompanied by: self  Following areas examined: Face ( patient is wearing face mask), neck, scalp, hands    Pope skin type:ii   Findings:  Smooth tan/brown/red targetoid macule on right crown of scalp 0.3cm  Brown tan macule with central hypopigmented papule on crown of scalp  Smooth flesh colored papule on crown of scalp  Assessment and Plan:  1) Neoplasm of uncertain behavior right crown of scalp 0.3cm  Rule out atypical nevus  TANGENTIAL BIOPSY:  After consent, anesthesia with LEC and prep, tangential biopsy performed.  No complications and routine wound care.  May grow back and will get a scar. Based on lesion type may need to completely remove lesion. Patient will be notified in 7-10 days of results. Wound care directions given.    2) Multiple benign nevi    Treatment of these lesions would be purely  cosmetic and not medically neccessary.  Lesion may recur and/or may not completely resolve. May need additional treatment.  Different removal options including excision, cryotherapy, cautery and /or laser.      Signs and Symptoms of non-melanoma skin cancer and ABCDEs. Patient was asked about new or changing moles/lesions on body.   Wear a sunscreen with at least SPF 30 on your face, ears, neck and V of the chest daily. Wear sunscreen on other areas of the body if those areas are exposed to the sun throughout the day. Sunscreens can contain physical and/or chemical blockers. Physical blockers are less likely to clog pores, these include zinc oxide and titanium dioxide. Reapply every two hour and after swimming. Sunscreen examples include Neutrogena, CeraVe, Blue Lizard, Elta MD and many others.        Follow up in yearly FBE      Again, thank you for allowing me to participate in the care of your patient.        Sincerely,        Alla Mao PA-C

## 2020-07-16 NOTE — PROGRESS NOTES
HPI:  Micki Thorpe is a 24 year old female patient here today for changing mole on scap .  Patient states this has been present for a while.  Patient reports the following symptoms: changing color, tenderness in area .  Patient reports the following previous treatments: none.  Patient reports the following modifying factors: none.  Associated symptoms: none.  Patient has no other skin complaints today.  Remainder of the HPI, Meds, PMH, Allergies, FH, and SH was reviewed in chart.    Pertinent Hx:   No personal or family history of skin cancer. 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    Past Medical History:   Diagnosis Date     Blood type AB+      Cervical high risk HPV (human papillomavirus) test positive 2015    age18, normal pap     Depressive disorder 3/2017      (normal spontaneous vaginal delivery) 2014     Status post induction of labor for low ALBERT and post-dates 2014     Streptococcus infection in conditions classified elsewhere and of unspecified site, group A     h/o     Unplanned wanted pregnancy     x 2- both on ocp's - 2nd on micronor        Past Surgical History:   Procedure Laterality Date     APPENDECTOMY  3/09    dr francois      REMOVE TONSILS/ADENOIDS,<11 Y/O  10/01    T & A <11y.o.     MOHS MICROGRAPHIC PROCEDURE      left upper abd -Compound dysplastic nevus with moderate to severe atypia         Family History   Problem Relation Age of Onset     Lipids Mother      Lipids Father      Diabetes Paternal Grandmother      C.A.D. Maternal Grandfather      Lipids Maternal Grandfather      Diabetes Maternal Grandfather      Diabetes Maternal Grandmother      Lipids Maternal Grandmother      C.A.D. Maternal Grandmother      Cancer - colorectal Maternal Grandmother 73     Skin Cancer Maternal Aunt      Familial Atypical Mole-Malignant Melanoma Syndrome Paternal Cousin        Social History     Socioeconomic History     Marital status: Single     Spouse name: Not on  file     Number of children: Not on file     Years of education: Not on file     Highest education level: Not on file   Occupational History     Occupation: postpartum       Employer: OTHER     Comment: laid off due to Covid-19 middle of March    Social Needs     Financial resource strain: Not on file     Food insecurity     Worry: Not on file     Inability: Not on file     Transportation needs     Medical: Not on file     Non-medical: Not on file   Tobacco Use     Smoking status: Never Smoker     Smokeless tobacco: Never Used   Substance and Sexual Activity     Alcohol use: Not Currently     Drug use: No     Sexual activity: Yes     Partners: Male   Lifestyle     Physical activity     Days per week: Not on file     Minutes per session: Not on file     Stress: Not on file   Relationships     Social connections     Talks on phone: Not on file     Gets together: Not on file     Attends Jew service: Not on file     Active member of club or organization: Not on file     Attends meetings of clubs or organizations: Not on file     Relationship status: Not on file     Intimate partner violence     Fear of current or ex partner: Not on file     Emotionally abused: Not on file     Physically abused: Not on file     Forced sexual activity: Not on file   Other Topics Concern      Service Not Asked     Blood Transfusions Not Asked     Caffeine Concern Yes     Comment: occas     Occupational Exposure Not Asked     Hobby Hazards Not Asked     Sleep Concern Not Asked     Stress Concern Not Asked     Weight Concern Not Asked     Special Diet Not Asked     Back Care Not Asked     Exercise Yes     Bike Helmet Not Asked     Seat Belt Yes     Self-Exams Not Asked     Parent/sibling w/ CABG, MI or angioplasty before 65F 55M? No   Social History Narrative     Not on file       Outpatient Encounter Medications as of 7/16/2020   Medication Sig Dispense Refill     norethindrone (MICRONOR) 0.35 MG tablet Take 1 tablet  (0.35 mg) by mouth daily 84 tablet 3     Prenatal Vit-Fe Fumarate-FA (PRENATAL VITAMIN) 27-0.8 MG TABS Take 1 tablet by mouth daily       No facility-administered encounter medications on file as of 7/16/2020.        Review Of Systems:  Skin: spot on scalp  Eyes: negative  Ears/Nose/Throat: negative  Respiratory: No shortness of breath, dyspnea on exertion, cough, or hemoptysis  Cardiovascular: negative  Gastrointestinal: negative  Genitourinary: negative  Musculoskeletal: negative  Neurologic: negative  Psychiatric: negative  Hematologic/Lymphatic/Immunologic: negative  Endocrine: negative      Objective:     /84   LMP 06/19/2020   Breastfeeding Yes   Eyes: Conjunctivae/lids: Normal   ENT: Lips:  Normal  MSK: Normal  Cardiovascular: Peripheral edema none  Pulm: Breathing Normal  Neuro/Psych: Orientation: A/O x 3 Normal; Mood/Affect: Normal, NAD, WDWN  Pt accompanied by: self  Following areas examined: Face ( patient is wearing face mask), neck, scalp, hands    Pope skin type:ii   Findings:  Smooth tan/brown/red targetoid macule on right crown of scalp 0.3cm  Brown tan macule with central hypopigmented papule on crown of scalp  Smooth flesh colored papule on crown of scalp  Assessment and Plan:  1) Neoplasm of uncertain behavior right crown of scalp 0.3cm  Rule out atypical nevus  TANGENTIAL BIOPSY:  After consent, anesthesia with LEC and prep, tangential biopsy performed.  No complications and routine wound care.  May grow back and will get a scar. Based on lesion type may need to completely remove lesion. Patient will be notified in 7-10 days of results. Wound care directions given.    2) Multiple benign nevi    Treatment of these lesions would be purely cosmetic and not medically neccessary.  Lesion may recur and/or may not completely resolve. May need additional treatment.  Different removal options including excision, cryotherapy, cautery and /or laser.      Signs and Symptoms of non-melanoma  skin cancer and ABCDEs. Patient was asked about new or changing moles/lesions on body.   Wear a sunscreen with at least SPF 30 on your face, ears, neck and V of the chest daily. Wear sunscreen on other areas of the body if those areas are exposed to the sun throughout the day. Sunscreens can contain physical and/or chemical blockers. Physical blockers are less likely to clog pores, these include zinc oxide and titanium dioxide. Reapply every two hour and after swimming. Sunscreen examples include Neutrogena, CeraVe, Blue Lizard, Elta MD and many others.        Follow up in yearly FBE     no

## 2020-07-22 LAB — COPATH REPORT: NORMAL

## 2020-07-27 ENCOUNTER — MEDICAL CORRESPONDENCE (OUTPATIENT)
Dept: HEALTH INFORMATION MANAGEMENT | Facility: CLINIC | Age: 25
End: 2020-07-27

## 2020-07-30 ENCOUNTER — TELEPHONE (OUTPATIENT)
Dept: FAMILY MEDICINE | Facility: CLINIC | Age: 25
End: 2020-07-30

## 2020-07-30 DIAGNOSIS — Z30.011 ENCOUNTER FOR INITIAL PRESCRIPTION OF CONTRACEPTIVE PILLS: ICD-10-CM

## 2020-07-30 NOTE — TELEPHONE ENCOUNTER
Norlyda (.35mg tablets )  not covered by patient insurance.     Please redo prescription for an alternative or start prior auth.     [facility] in [place] at [phone]      Kenia Corona

## 2020-08-03 RX ORDER — ACETAMINOPHEN AND CODEINE PHOSPHATE 120; 12 MG/5ML; MG/5ML
0.35 SOLUTION ORAL DAILY
Qty: 84 TABLET | Refills: 3 | Status: SHIPPED | OUTPATIENT
Start: 2020-08-03 | End: 2021-07-08

## 2020-08-03 NOTE — TELEPHONE ENCOUNTER
Pt is still breastfeeding.  Any progesterone only OCP is ok for this patient. Re-submitted rx.  Please call pharmacy.

## 2020-09-14 ENCOUNTER — MEDICAL CORRESPONDENCE (OUTPATIENT)
Dept: HEALTH INFORMATION MANAGEMENT | Facility: CLINIC | Age: 25
End: 2020-09-14

## 2021-01-09 ENCOUNTER — HEALTH MAINTENANCE LETTER (OUTPATIENT)
Age: 26
End: 2021-01-09

## 2021-07-05 ASSESSMENT — ANXIETY QUESTIONNAIRES
6. BECOMING EASILY ANNOYED OR IRRITABLE: SEVERAL DAYS
GAD7 TOTAL SCORE: 6
4. TROUBLE RELAXING: SEVERAL DAYS
3. WORRYING TOO MUCH ABOUT DIFFERENT THINGS: SEVERAL DAYS
7. FEELING AFRAID AS IF SOMETHING AWFUL MIGHT HAPPEN: SEVERAL DAYS
GAD7 TOTAL SCORE: 6
7. FEELING AFRAID AS IF SOMETHING AWFUL MIGHT HAPPEN: SEVERAL DAYS
1. FEELING NERVOUS, ANXIOUS, OR ON EDGE: SEVERAL DAYS
2. NOT BEING ABLE TO STOP OR CONTROL WORRYING: SEVERAL DAYS
5. BEING SO RESTLESS THAT IT IS HARD TO SIT STILL: NOT AT ALL

## 2021-07-06 ASSESSMENT — ANXIETY QUESTIONNAIRES: GAD7 TOTAL SCORE: 6

## 2021-07-08 ENCOUNTER — TELEPHONE (OUTPATIENT)
Dept: BEHAVIORAL HEALTH | Facility: CLINIC | Age: 26
End: 2021-07-08

## 2021-07-08 ENCOUNTER — HOSPITAL ENCOUNTER (OUTPATIENT)
Dept: BEHAVIORAL HEALTH | Facility: CLINIC | Age: 26
End: 2021-07-08
Attending: FAMILY MEDICINE

## 2021-07-08 ENCOUNTER — VIRTUAL VISIT (OUTPATIENT)
Dept: FAMILY MEDICINE | Facility: CLINIC | Age: 26
End: 2021-07-08
Payer: COMMERCIAL

## 2021-07-08 ENCOUNTER — TELEPHONE (OUTPATIENT)
Dept: FAMILY MEDICINE | Facility: CLINIC | Age: 26
End: 2021-07-08

## 2021-07-08 DIAGNOSIS — F43.10 PTSD (POST-TRAUMATIC STRESS DISORDER): Primary | ICD-10-CM

## 2021-07-08 DIAGNOSIS — R45.4 IRRITABILITY: ICD-10-CM

## 2021-07-08 DIAGNOSIS — F41.9 ANXIETY: ICD-10-CM

## 2021-07-08 PROCEDURE — 99214 OFFICE O/P EST MOD 30 MIN: CPT | Mod: 95 | Performed by: FAMILY MEDICINE

## 2021-07-08 PROCEDURE — 999N000216 HC STATISTIC ADULT CD FACE TO FACE-NO CHRG: Mod: GT | Performed by: COUNSELOR

## 2021-07-08 RX ORDER — CITALOPRAM HYDROBROMIDE 20 MG/1
TABLET ORAL
Qty: 30 TABLET | Refills: 1 | Status: SHIPPED | OUTPATIENT
Start: 2021-07-08 | End: 2021-09-13

## 2021-07-08 RX ORDER — HYDROXYZINE PAMOATE 25 MG/1
25 CAPSULE ORAL 3 TIMES DAILY PRN
Qty: 10 CAPSULE | Refills: 1 | Status: SHIPPED | OUTPATIENT
Start: 2021-07-08 | End: 2021-09-13

## 2021-07-08 ASSESSMENT — ANXIETY QUESTIONNAIRES
3. WORRYING TOO MUCH ABOUT DIFFERENT THINGS: SEVERAL DAYS
5. BEING SO RESTLESS THAT IT IS HARD TO SIT STILL: NOT AT ALL
2. NOT BEING ABLE TO STOP OR CONTROL WORRYING: SEVERAL DAYS
1. FEELING NERVOUS, ANXIOUS, OR ON EDGE: SEVERAL DAYS
7. FEELING AFRAID AS IF SOMETHING AWFUL MIGHT HAPPEN: SEVERAL DAYS
IF YOU CHECKED OFF ANY PROBLEMS ON THIS QUESTIONNAIRE, HOW DIFFICULT HAVE THESE PROBLEMS MADE IT FOR YOU TO DO YOUR WORK, TAKE CARE OF THINGS AT HOME, OR GET ALONG WITH OTHER PEOPLE: SOMEWHAT DIFFICULT
GAD7 TOTAL SCORE: 5
6. BECOMING EASILY ANNOYED OR IRRITABLE: SEVERAL DAYS

## 2021-07-08 ASSESSMENT — PATIENT HEALTH QUESTIONNAIRE - PHQ9
5. POOR APPETITE OR OVEREATING: NOT AT ALL
SUM OF ALL RESPONSES TO PHQ QUESTIONS 1-9: 2
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
## 2021-07-08 NOTE — PATIENT INSTRUCTIONS
[facility]  [address] [place] [address]  Office: [phone]   Fax:    [phone]       .Return in about 1 month (around 8/8/2021) for depression/anxiety follow up, as a video visit through [employer] . -Appointment scheduled for 3:20 PM as a video visit with me on 8/9/2021.    Please, call our clinic or go to the ER immediately if signs or symptoms worsen or fail to improve as anticipated.       Patient Education     Treating Posttraumatic Stress Disorder (PTSD) with Medicine   Posttraumatic stress disorder (PTSD) can happen after you go through a severe trauma. This may include physical abuse, rape, a natural disaster, a car accident, the death of a loved one, or  combat. Symptoms of PTSD involve intense anxiety that keeps coming back. Nightmares, intrusive memories, and flashbacks (vivid memories that seem real) related to the trauma may also occur. But you don t have to suffer needlessly anymore. Treatment is available. Along with therapy (counseling), medicine may help manage your symptoms.     Medicines  Certain medicines may be prescribed to help relieve your symptoms. As a result, you may feel less anxious or depressed. You may also feel able to move forward with therapy. At first, medicines and dosages may need to be adjusted to find what works best for you. Try to be patient. Tell your doctor how a medicine makes you feel. This way, you can work together to find the treatment that s best for you. Keep in mind that medicines can have side effects. Talk to your doctor about any side effects that are bothering you. Changing the dose or type of medicine may help. Don t stop taking medicine on your own. Stopping a medicine can cause serious withdrawal symptoms. It can also bring back PTSD symptoms.   Anti-anxiety medicine eases symptoms and helps you relax. Your doctor and pharmacist will explain when and how to use it. It may be prescribed for use before entering  situations that make you anxious. Or, you may be told to take it on a regular schedule. Anti-anxiety medicine may make you feel a little sleepy or  out of it.  Don t drive a car or operate machinery while on this medicine, until you know how it affects you.   Caution  Never use alcohol or other drugs with anti-anxiety medicine. This could result in sedation, lack of muscular control, coma or death. Also, use only the amount of medicine prescribed to you and never share your medicine. If you think you may have taken too much, get emergency care right away.   Keep taking medicines as prescribed  Never change your dosage or stop taking your medicine without talking to your doctor first. Keep the following in mind:     Some medicine must be taken on a schedule. Make this part of your daily routine. For instance, always take your pill before brushing your teeth. A pillbox can help you remember if you ve taken your medicine each day.    Medicines are often taken for  6 to 12 months. Your doctor will then decide if you need to keep taking them. Many people who have also had therapy may no longer require medicine to manage anxiety.    You may need to stop taking medicine slowly to give your body time to adjust. When it s time to stop, your doctor will tell you more.    If symptoms return, you may need to start taking medicine again. This isn t your fault. It s just the nature of your anxiety disorder. If symptoms return, seek care as soon as possible.  Special concerns    Side effects. Medicines may cause side effects. Ask your doctor or pharmacist what you can expect. They may have ideas for preventing some side effects.    Sexual problems. Some antidepressants can affect your desire for sex or your ability to have an orgasm. A change in dosage or medicine often solves the problem. If you have a sexual side effect that concerns you, tell your doctor.    Addiction. If you have history of addiction, you may need to stay away  from certain medicines. Be honest with your provider about any past history of drug use. This will ensure that you receive the safest possible medicines for your PTSD symptoms. Anti-anxiety medicine carries a risk of addiction. No matter the reason for medicine or the type of medicine, your provider will want to prescribe them based on a complete knowledge of your health history.    Medicine security. Anti-anxiety medicines are sought after by those who abuse or sell drugs. Keep your medicine in a secure location, not in the bathroom cabinet or on the kitchen counter. Make certain the medicine is not visible or can be reached by friends or family members.    To learn more    National [place] of Mental Health www.nimh.nih.gov/topics/mmvaf-eupe-ddjc.shtml    American Academy of Experts in Traumatic Stress 809-771-2507 www.aaets.org    Jac last reviewed this educational content on 4/1/2020 2000-2021 [employer]. All rights reserved. This information is not intended as a substitute for professional medical care. Always follow your healthcare professional's instructions.           Patient Education     Treating Post-Traumatic Stress Disorder (PTSD) with Therapy  Post-traumatic stress disorder (PTSD) is a type of anxiety disorder. It can happen after you go through an extreme trauma, such as a car crash or combat. With PTSD, you constantly relive the trauma through nightmares, intrusive memories, and flashbacks. Talk therapy (also called counseling) is a very helpful treatment for PTSD. When done by a trained professional, therapy helps you face and learn to manage your problem. It may take some time before you notice how much therapy is helping, but stick with it.   Save & Preview  Cognitive behavioral therapy (CBT)  Cognitive behavioral therapy (CBT) teaches you to manage anxiety by helping you understand how you think and act when you're anxious. Research has shown that this treatment works very  well for anxiety disorders including PTSD. CBT is run almost like a class, with homework and skill-building activities that teach you to cope with anxiety step by step. It can be done in a group or one-on-one, and often takes place for a set number of sessions. CBT has 2 main parts:    Cognitive therapy. This helps you identify the negative, irrational thoughts that occur with your anxiety. You'll learn to replace these with more positive, realistic thoughts.    Behavioral therapy. This helps you change how you react to anxiety. You'll learn coping skills and relaxation methods to help you deal with anxiety in a whole new way.  Other forms of therapy  Other therapy techniques may work better for you than CBT. Or you may move from CBT to another form of therapy as your treatment progresses. You may meet with a therapist by yourself or in a group, depending on your needs. Therapy can also help you work through problems in your life that may be making your anxiety disorder worse. This includes things such as drug or alcohol abuse.  Getting better with time  Therapy will help you feel better and teach you new skills to help manage anxiety long-term. But change doesn't happen right away. It takes a commitment from you. And treatment only works if you learn to face the causes of your anxiety. So, you might feel worse before you feel better. This can sometimes make it hard to stick with it. Remember: Therapy is a very effective treatment. The results will be worth it.  To learn more  Anxiety and Depression Association of [place], www.adaa.org  Centers for Disease Control and Prevention, www.cdc.gov  National [place] of Mental Health, www.nimh.nih.gov  American Academy of Child and Adolescent Psychiatry, www.aacap.org  Jac last reviewed this educational content on 5/1/2020 2000-2021 [employer]. All rights reserved. This information is not intended as a substitute for professional medical care.  Always follow your healthcare professional's instructions.           Patient Education     Understanding Post-Traumatic Stress Disorder (PTSD)  You may have post-traumatic stress disorder (PTSD) if you ve been through a traumatic event and are having trouble dealing with it. Such events may include the death of a loved one, a car crash, rape, domestic violence,  combat, or violent crime. While it's normal to have some anxiety after such an event, it usually goes away in time. But with PTSD, the anxiety is more intense and keeps coming back. And the trauma is relived through nightmares, intrusive memories, and flashbacks (vivid memories that seem real). The symptoms of PTSD can cause problems with relationships and make it hard to cope with daily life. But it can be treated. With help, you can feel better.    How does it feel?  Symptoms of PTSD often start within a few months of the event. Here are some common symptoms:    You startle more easily, and feel anxious and on edge all the time. This can lead to sleep problems. It a can cause you to feel overwhelmed or become angry or upset more easily. Panic attacks (sudden, intense feelings of terror and a strong need to escape from wherever you are) can also occur.    You relive the event through nightmares and flashbacks. During these, you may feel strong emotions and as though you re reliving the event all over again.    You stay away from people, places, or activities that remind you of the trauma. You may hold in your feelings and become emotionally numb. It may be hard to focus at work or school or to relax with friends. You may be afraid to let people get close to you.    You may also have trouble remembering parts of the traumatic event. Negative thoughts about oneself and feelings of guilt and shame are also common PTSD symptoms.  Who does it affect?  Not everyone who survives a trauma will have PTSD. But many will. In fact, millions of people have the  "condition. PTSD can happen to anyone, but it most often develops after a person feels his or her or another s life is threatened.  You re at risk for PTSD if you have experienced or witnessed:    A rape or sexual abuse    A mugging or carjacking    A car accident or plane crash    A life-threatening illness    War    Domestic violence    Childhood abuse    Natural disasters such as earthquakes, floods, or hurricanes    The sudden death of a loved one  Finding help  The first step is to talk with a trusted counselor or healthcare provider. He or she can help you take the next step to treatment. This may include therapy (also called counseling) and medicine. Many therapists now practice what is called \"trauma-informed care.\" These therapists use specific interventions that acknowledge and address trauma s consequences and help people heal.  Are you having suicidal thoughts?  You may be feeling helpless, hopeless, and that you can t go on. You may even have thoughts of suicide. But help is available. There are ways to ease this pain and manage the problems in your life.  If you are thinking about harming or killing yourself, please tell your healthcare provider or someone you care about immediately or go to the nearest crisis walk-in center or emergency room.  You can also call, toll-free:    800-SUICIDE (429-259-8712)     [phone] ([phone])  To learn more    American Psychiatric Association 314-992-4634 www.psychiatry.org/patients-families    American Psychological Association www.apa.org/helpcenter    Anxiety and Depression Association of [place] www.adaa.org    Mental Health [place] www.nmha.org    National Center for PTSD www.ptsd.va.gov    National [place] of Mental Health www.nimh.nih.gov/topics/pldhb-cxpv-sivv.shtml    National Suicide Prevention Lifeline 340-624-UQYD (641-082-6096) www.suicidepreventionlifeline.org    Jac last reviewed this educational content on 4/1/2020 2000-2021 The " [employer]. All rights reserved. This information is not intended as a substitute for professional medical care. Always follow your healthcare professional's instructions.           Patient Education     Understanding Anxiety Disorders  Almost everyone gets nervous now and then. It s normal to have knots in your stomach before a test. Or for your heart to beat fast on a first date. But an anxiety disorder is much more than a case of nerves. In fact, its symptoms may be overwhelming. But treatment can ease many of these symptoms. Talking with your healthcare provider is the first step.    What are anxiety disorders?  An anxiety disorder causes very strong feelings of panic and fear. These feelings may occur for no clear reason. And they tend to happen again and again. They may prevent you from coping with life. They may cause you great distress. You may then stay away from anything that triggers your fear. In extreme cases, you may never leave the house. Anxiety disorders may cause other symptoms, such as:    Obsessive thoughts that are unwanted and you can t control    Constant nightmares or painful thoughts of the past    Upset stomach, sweating, and muscle tension    Trouble sleeping or focusing  What causes anxiety disorders?  Anxiety disorders tend to run in families. For some people, childhood abuse or neglect may play a role. For others, stressful life events or trauma may trigger these disorders. Anxiety can trigger low self-esteem and poor coping skills.  Types of anxiety disorders    Panic disorder. This causes a very strong fear of being in danger.    Phobias. These are extreme fears of certain things, places, or events.    Obsessive-compulsive disorder (OCD). This makes you have unwanted thoughts and urges. You also may do certain things over and over.    Posttraumatic stress disorder (PTSD). This occurs in people who have been through a terrible ordeal. It can cause nightmares and flashbacks  about the event.    Generalized anxiety disorder. This causes constant worry. It can greatly disrupt your life.    Getting better  You may believe that nothing can help you. Or, you might fear what others may think. But most anxiety symptoms can be eased. Having an anxiety disorder is nothing to be ashamed of. Most people do best with treatment that combines medicine and individual and group therapy. These aren t cures. But they can help you live a healthier life.  [employer] last reviewed this educational content on 3/1/2020    7328-1627 [employer]. All rights reserved. This information is not intended as a substitute for professional medical care. Always follow your healthcare professional's instructions.

## 2021-07-08 NOTE — PROGRESS NOTES
Clinician explained program to the pt. Pt is interested in individual therapy with concerns that she may have anxiety. Writer provided [facility] as a referral.

## 2021-07-09 ASSESSMENT — ANXIETY QUESTIONNAIRES: GAD7 TOTAL SCORE: 5

## 2021-07-26 ENCOUNTER — TELEPHONE (OUTPATIENT)
Dept: FAMILY MEDICINE | Facility: CLINIC | Age: 26
End: 2021-07-26

## 2021-07-26 DIAGNOSIS — F40.243 FEAR OF FLYING: Primary | ICD-10-CM

## 2021-07-26 RX ORDER — LORAZEPAM 0.5 MG/1
TABLET ORAL
Qty: 6 TABLET | Refills: 0 | Status: SHIPPED | OUTPATIENT
Start: 2021-07-26 | End: 2022-01-27

## 2021-07-26 NOTE — TELEPHONE ENCOUNTER
Returned pt's call.  Had a bad panic attack last night and tried the hydroxyzine.  Is leaving tonight.  Has to pump and dump, if still breastfeeding.  Discussed in detail with pt - Take 1-2 tabs 30minutes prior to flight - no alcohol or other sedating meds within 8 hours of this medication.     She voiced understanding of the above.

## 2021-07-26 NOTE — TELEPHONE ENCOUNTER
Triston, on [facility], calling to say that the hydrOXYzine prescribed for patient for an upcoming flight is not helping her. She tried taking if for the last 2 days and she said it is no helping at all with her anxiety. They have a flight tonight at 9pm tonight and she was hoping to get a different medication. Call her to advise ASAP at [phone]

## 2021-08-28 ENCOUNTER — HEALTH MAINTENANCE LETTER (OUTPATIENT)
Age: 26
End: 2021-08-28

## 2021-09-13 ENCOUNTER — VIRTUAL VISIT (OUTPATIENT)
Dept: FAMILY MEDICINE | Facility: CLINIC | Age: 26
End: 2021-09-13
Payer: COMMERCIAL

## 2021-09-13 DIAGNOSIS — F41.9 ANXIETY: ICD-10-CM

## 2021-09-13 PROCEDURE — 99213 OFFICE O/P EST LOW 20 MIN: CPT | Mod: 95 | Performed by: FAMILY MEDICINE

## 2021-09-13 RX ORDER — CITALOPRAM HYDROBROMIDE 20 MG/1
20 TABLET ORAL DAILY
Qty: 90 TABLET | Refills: 1 | Status: SHIPPED | OUTPATIENT
Start: 2021-09-13 | End: 2021-09-13

## 2021-09-13 RX ORDER — CITALOPRAM HYDROBROMIDE 20 MG/1
20 TABLET ORAL DAILY
Qty: 90 TABLET | Refills: 1 | Status: SHIPPED | OUTPATIENT
Start: 2021-09-13 | End: 2022-01-27

## 2021-09-13 ASSESSMENT — ANXIETY QUESTIONNAIRES
6. BECOMING EASILY ANNOYED OR IRRITABLE: NOT AT ALL
5. BEING SO RESTLESS THAT IT IS HARD TO SIT STILL: NOT AT ALL
7. FEELING AFRAID AS IF SOMETHING AWFUL MIGHT HAPPEN: NOT AT ALL
3. WORRYING TOO MUCH ABOUT DIFFERENT THINGS: NOT AT ALL
GAD7 TOTAL SCORE: 1
1. FEELING NERVOUS, ANXIOUS, OR ON EDGE: SEVERAL DAYS
2. NOT BEING ABLE TO STOP OR CONTROL WORRYING: NOT AT ALL
IF YOU CHECKED OFF ANY PROBLEMS ON THIS QUESTIONNAIRE, HOW DIFFICULT HAVE THESE PROBLEMS MADE IT FOR YOU TO DO YOUR WORK, TAKE CARE OF THINGS AT HOME, OR GET ALONG WITH OTHER PEOPLE: SOMEWHAT DIFFICULT

## 2021-09-13 ASSESSMENT — PATIENT HEALTH QUESTIONNAIRE - PHQ9
5. POOR APPETITE OR OVEREATING: NOT AT ALL
SUM OF ALL RESPONSES TO PHQ QUESTIONS 1-9: 0

## 2021-09-13 NOTE — PATIENT INSTRUCTIONS
[facility]  [address] [place] [address]  Office: [phone]   Fax:    [phone]       Return in 6 weeks (on 10/25/2021) for Physical/Preventative Visit, with Dr. Garcia, in person. = appt set for 0940 am.     If you are no longer breastfeeding, Please come in fasting :  nothing to eat for at least 8 , preferably 12 hours ahead of appointment. Please do come in well hydrated though - ok to  have water, black coffee or plain tea, no creamers or sweeteners of any kind, please.               Thank you so much for doing a video visit with us today. And, again, thank you  for choosing our [facility].  Please contact us with any questions that you may have.   We appreciate the opportunity to serve you now and look forward to supporting your healthcare needs for a long time to come!    Our clinic for the foreseeable future and until further notice , will continue to offer virtual visits, including telephone visits, video visits,  and e-visits, especially during this period of COVID-19 coronavirus pandemic.  Please call to schedule another one if you need it!        Most Sincerely,     Ashwini Garcia MD                                              To reach your [facility] care team after hours call:   [phone]    PLEASE NOTE OUR HOURS HAVE CHANGED secondary to COVID-19 coronavirus pandemic, as we are trying to minimize patient exposure to the virus,  which is now widespread in the [facility].  These hours may change with very little notice.  We apologize for any inconvenience.       Our current clinic hours are:         Monday- Thursday   7:00am - 6:00pm  in person.      Friday  7:00am- 5:00pm in person                       Saturday and Sunday : Closed to in person and virtual visits            We have telephone and virtual visit times available between 7:00am - 6pm on             Monday-Friday as well.                                                 Phone:  [phone]    Our pharmacy hours: Monday  through Friday 9:00am to 5:00pm                        Saturday - 9:00 am to 12 noon         Sunday : Closed.     ###  Please note: at this time we are not accepting any walk-in visits. ###      There is also information available at our web site:  [email or web address]    If your provider ordered any lab tests and you do not receive the results within 10 business days, please call the clinic.    If you need a medication refill please contact your pharmacy.  Please allow 2 business days for your refill to be completed.    Our clinic offers telephone visits and e visits.  Please ask one of your team members to explain more.      Use [employer] (secure email communication and access to your chart) to send your primary care provider a message or make an appointment. Ask someone on your Team how to sign up for [facility].     Pharmacy is drive-thru only at this time secondary to the COVID-19 coronavirus pandemic, as we are trying to minimize patient exposure to the virus,  which is now widespread in the state.        There is also information available at our web site:  [email or web address]    If your provider ordered any lab tests and you do not receive the results within 10 business days, please call the clinic.    If you need a medication refill please contact your pharmacy.  Please allow 2 business days for your refill to be completed.

## 2021-09-13 NOTE — PROGRESS NOTES
Micki is a 25 year old who is being evaluated via a billable video visit.      How would you like to obtain your AVS? MyChart  If the video visit is dropped, the invitation should be resent by: Text to cell phone: [phone]  Will anyone else be joining your video visit? Yes: daughter. How would they like to receive their invitation? Other e-mail: will be with mom      Video Start Time: 11:30 AM    Assessment & Plan :       ICD-10-CM    1. Anxiety  F41.9 citalopram (CELEXA) 20 MG tablet     DISCONTINUED: citalopram (CELEXA) 20 MG tablet            Ordering of each unique test  Prescription drug management         MEDICATIONS:   Orders Placed This Encounter   Medications     DISCONTD: citalopram (CELEXA) 20 MG tablet     Sig: Take 1 tablet (20 mg) by mouth daily Take 1/2 tablet (10 mg) for 1-2 weeks, then increase to 1 tablet orally daily     Dispense:  90 tablet     Refill:  1     citalopram (CELEXA) 20 MG tablet     Sig: Take 1 tablet (20 mg) by mouth daily     Dispense:  90 tablet     Refill:  1     Disregard previous rx with previous sig.  Of 1/2 tabs daily ramping up to 1 full tab daily          - Continue other medications without change  Regular exercise  See Patient Instructions    No follow-ups on file.    Ashwini Garcia MD  [facility] [place]    Subjective :   Micki is a 25 year old who presents for the following health issues     HPI     Depression and Anxiety Follow-Up:     How are you doing with your depression since your last visit? Improved     How are you doing with your anxiety since your last visit?  Improved     Are you having other symptoms that might be associated with depression or anxiety? Yes:  mild anxious thoughts    Have you had a significant life event? No     Do you have any concerns with your use of alcohol or other drugs? No    Social History     Tobacco Use     Smoking status: Never Smoker     Smokeless tobacco: Never Used   Substance Use Topics     Alcohol  use: Not Currently     Drug use: No     PHQ 6/22/2020 7/8/2021 9/13/2021   PHQ-9 Total Score 1 2 0   Q9: Thoughts of better off dead/self-harm past 2 weeks Not at all Not at all Not at all     ZULMA-7 SCORE 7/5/2021 7/8/2021 9/13/2021   Total Score - - -   Total Score 6 (mild anxiety) - -   Total Score 6 5 1     Last PHQ-9 9/13/2021   1.  Little interest or pleasure in doing things 0   2.  Feeling down, depressed, or hopeless 0   3.  Trouble falling or staying asleep, or sleeping too much 0   4.  Feeling tired or having little energy 0   5.  Poor appetite or overeating 0   6.  Feeling bad about yourself 0   7.  Trouble concentrating 0   8.  Moving slowly or restless 0   Q9: Thoughts of better off dead/self-harm past 2 weeks 0   PHQ-9 Total Score 0   Difficulty at work, home, or with people Not difficult at all     ZULMA-7  9/13/2021   1. Feeling nervous, anxious, or on edge 1   2. Not being able to stop or control worrying 0   3. Worrying too much about different things 0   4. Trouble relaxing 0   5. Being so restless that it is hard to sit still 0   6. Becoming easily annoyed or irritable 0   7. Feeling afraid, as if something awful might happen 0   ZULMA-7 Total Score 1   If you checked any problems, how difficult have they made it for you to do your work, take care of things at home, or get along with other people? Somewhat difficult     Panic attacks are much improved. Still taking 1/2 tab of 20mg citalopram, but would like to increase to 1 full tab daily.   Hasn't had to use lorazepam or hydroxyzine for panic attacks except for an airline flight - the hydroxyzine didn't work very well and used lorazepam 3 tabs total 1 for each of 3 long flights  - tremendous fear of flying.       Suicide Assessment Five-step Evaluation and Treatment (SAFE-T)      Review of Systems   Constitutional, HEENT, cardiovascular, pulmonary, gi and gu systems are negative, except as otherwise noted.      Objective  :          Vitals:  No  vitals were obtained today due to virtual visit.    Physical Exam :   GENERAL: Healthy, alert and no distress  EYES: Eyes grossly normal to inspection.  No discharge or erythema, or obvious scleral/conjunctival abnormalities.  RESP: No audible wheeze, cough, or visible cyanosis.  No visible retractions or increased work of breathing.    SKIN: Visible skin clear. No significant rash, abnormal pigmentation or lesions.  NEURO: Cranial nerves grossly intact.  Mentation and speech appropriate for age.  PSYCH: Mentation appears normal, affect normal/bright, judgement and insight intact, normal speech and appearance well-groomed.            Video-Visit Details:     Type of service:  Video Visit    Video End Time:11:37 AM    Originating Location (pt. Location): Home    Distant Location (provider location):  [facility]     Platform used for Video Visit: Zighra

## 2021-09-14 ASSESSMENT — ANXIETY QUESTIONNAIRES: GAD7 TOTAL SCORE: 1

## 2021-10-23 ENCOUNTER — HEALTH MAINTENANCE LETTER (OUTPATIENT)
Age: 26
End: 2021-10-23

## 2021-12-21 ENCOUNTER — E-VISIT (OUTPATIENT)
Dept: URGENT CARE | Facility: URGENT CARE | Age: 26
End: 2021-12-21
Payer: COMMERCIAL

## 2021-12-21 DIAGNOSIS — Z20.822 SUSPECTED COVID-19 VIRUS INFECTION: ICD-10-CM

## 2021-12-21 DIAGNOSIS — M79.10 MYALGIA: Primary | ICD-10-CM

## 2021-12-21 PROCEDURE — 99421 OL DIG E/M SVC 5-10 MIN: CPT | Performed by: NURSE PRACTITIONER

## 2021-12-22 ENCOUNTER — LAB (OUTPATIENT)
Dept: URGENT CARE | Facility: URGENT CARE | Age: 26
End: 2021-12-22
Attending: NURSE PRACTITIONER
Payer: COMMERCIAL

## 2021-12-22 DIAGNOSIS — Z20.822 SUSPECTED COVID-19 VIRUS INFECTION: ICD-10-CM

## 2021-12-22 DIAGNOSIS — M79.10 MYALGIA: ICD-10-CM

## 2021-12-22 LAB
FLUAV AG SPEC QL IA: NEGATIVE
FLUBV AG SPEC QL IA: NEGATIVE
SARS-COV-2 RNA RESP QL NAA+PROBE: POSITIVE

## 2021-12-22 PROCEDURE — 87804 INFLUENZA ASSAY W/OPTIC: CPT

## 2021-12-22 PROCEDURE — U0003 INFECTIOUS AGENT DETECTION BY NUCLEIC ACID (DNA OR RNA); SEVERE ACUTE RESPIRATORY SYNDROME CORONAVIRUS 2 (SARS-COV-2) (CORONAVIRUS DISEASE [COVID-19]), AMPLIFIED PROBE TECHNIQUE, MAKING USE OF HIGH THROUGHPUT TECHNOLOGIES AS DESCRIBED BY CMS-2020-01-R: HCPCS

## 2021-12-22 PROCEDURE — U0005 INFEC AGEN DETEC AMPLI PROBE: HCPCS

## 2021-12-22 NOTE — PATIENT INSTRUCTIONS
Dear Micki Thorpe,    Your symptoms show that you may have coronavirus (COVID-19). This illness can cause fever, cough and trouble breathing. Many people get a mild case and get better on their own. Some people can get very sick.    Because you also reported bodyaches I would like to also test you for FLU to determine if we need to treat you for that as well.    What should I do?  We would like to test you for Covid-19 virus and Influenza. I have placed orders for these tests.     For all employees or close contacts (except [facility] and [facility] - see below), go to your [employer] home page and scroll down to the section that says  You have an appointment that needs to be scheduled  and click the large green button that says  Schedule Now  and follow the steps to find the next available opening.  It is important that when you are asked what the reason for your appointment is that you mention you need BOTH Covid and FLU tests.  tests.     If you are unable to complete these steps or if you cannot find any available times, please call [phone] to schedule employee testing.     [place] employees or close contacts, please call [phone].   [place] ([facility]) employees or close contacts call [phone].    Return to work/school/ guidance:  Please let your workplace manager and staffing office know when your quarantine ends     We can t give you an exact date as it depends on the above. You can calculate this on your own or work with your manager/staffing office to calculate this. (For example if you were exposed on 10/4, you would have to quarantine for 14 full days. That would be through 10/18. You could return on 10/19.)      If you receive a positive COVID-19 test result, follow the guidance of the those who are giving you the results. Usually the return to work is 10 (or in some cases 20 days from symptom onset.) If you work at [employer], you must also be cleared by Employee Occupational  Health and Safety to return to work.        If you receive a negative COVID-19 test result and did not have a high risk exposure to someone with a known positive COVID-19 test, you can return to work once you're free of fever for 24 hours without fever-reducing medication and your symptoms are improving or resolved.      If you receive a negative COVID-19 test and If you had a high risk exposure to someone who has tested positive for COVID-19 then you can return to work 14 days after your last contact with the positive individual    Note: If you have ongoing exposure to the covid positive person, this quarantine period may be more than 14 days. (For example, if you are continued to be exposed to your child who tested positive and cannot isolate from them, then the quarantine of 7-14 days can't start until your child is no longer contagious. This is typically 10 days from onset of the child's symptoms. So the total duration may be 17-24 days in this case.)    Sign up for [employer].   We know it's scary to hear that you might have COVID-19. We want to track your symptoms to make sure you're okay over the next 2 weeks. Please look for an email from [employer]--this is a free, online program that we'll use to keep in touch. To sign up, follow the link in the email you will receive. Learn more at [email or web address]    How can I take care of myself?    Get lots of rest. Drink extra fluids (unless a doctor has told you not to)    Take Tylenol (acetaminophen) or ibuprofen for fever or pain. If you have liver or kidney problems, ask your family doctor if it's okay to take Tylenol o ibuprofen    If you have other health problems (like cancer, heart failure, an organ transplant or severe kidney disease): Call your specialty clinic if you don't feel better in the next 2 days.    Know when to call 911. Emergency warning signs include:  o Trouble breathing or shortness of breath  o Pain or pressure in the  chest that doesn't go away  o Feeling confused like you haven't felt before, or not being able to wake up  o Bluish-colored lips or face    Where can I get more information?  [employer] - About COVID-19:   [email or web address]    CDC - What to Do If You're Sick:   www.cdc.gov/coronavirus/2019-ncov/about/steps-when-sick.html      December 21, 2021  RE:  Micki Thorpe                                                                                                                  [address] [facility] [address]      To whom it may concern:    I evaluated Micki Thorpe on December 21, 2021. Micki Thorpe should be excused from work/school.     They should let their workplace manager and staffing office know when their quarantine ends.    We can not give an exact date as it depends on the information below. They can calculate this on their own or work with their manager/staffing office to calculate this. (For example if they were exposed on 10/04, they would have to quarantine for 14 full days. That would be through 10/18. They could return on 10/19.)    Quarantine Guidelines:      If patient receives a positive COVID-19 test result, they should follow the guidance of those who are giving the results. Usually the return to work is 10 (or in some cases 20 days from symptom onset.) If they work at [facility], they must be cleared by Employee Occupational Health and Safety to return to work.        If patient receives a negative COVID-19 test result and did not have a high risk exposure to someone with a known positive COVID-19 test, they can return to work once they're free of fever for 24 hours without fever-reducing medication and their symptoms are improving or resolved.      If patient receives a negative COVID-19 test and if they had a high risk exposure to someone who has tested positive for COVID-19 then they can return to work 14 days after their last contact with the  positive individual    Note: If there is ongoing exposure to the covid positive person, this quarantine period may be longer than 14 days. (For example, if they are continually exposed to their child, who tested positive and cannot isolate from them, then the quarantine of 7-14 days can't start until their child is no longer contagious. This is typically 10 days from onset to the child's symptoms. So the total duration may be 17-24 days in this case.)     Sincerely,  ELSI Becker CNP

## 2021-12-26 ENCOUNTER — NURSE TRIAGE (OUTPATIENT)
Dept: CARE COORDINATION | Facility: CLINIC | Age: 26
End: 2021-12-26
Payer: COMMERCIAL

## 2021-12-26 NOTE — TELEPHONE ENCOUNTER
Received notification of patient's report of worsening shortness of breath via [employer]. Called patient and left message. RN to make second attempt.     Ella Carroll RN  [facility]

## 2021-12-26 NOTE — TELEPHONE ENCOUNTER
"Received notification of patient's report of worsening shortness of breath via [employer]. See assessment. Patient not concerned. Will send patient information via [employer] for home treatment measures for management of cough, worrisome signs/symptoms that require urgent medical evaluation and 24/7 [facility] phone number should patient have questions or concerns after hours. Patient verbalizes agreement with plan.        Answer Assessment - Initial Assessment Questions  1. RESPIRATORY STATUS: \"Describe your breathing?\" (e.g., wheezing, shortness of breath, unable to speak, severe coughing)       Mild shortness of breath after cough and will walking up stairs. Patient is not concerned  2. ONSET: \"When did this breathing problem begin?\"       Last couple of days. Symptoms of COVID-19 started 8 days ago but have mostly resolved  3. PATTERN \"Does the difficult breathing come and go, or has it been constant since it started?\"       As above  4. SEVERITY: \"How bad is your breathing?\" (e.g., mild, moderate, severe)     - MILD: No SOB at rest, mild SOB with walking, speaks normally in sentences, can lay down, no retractions, pulse < 100.     - MODERATE: SOB at rest, SOB with minimal exertion and prefers to sit, cannot lie down flat, speaks in phrases, mild retractions, audible wheezing, pulse 100-120.     - SEVERE: Very SOB at rest, speaks in single words, struggling to breathe, sitting hunched forward, retractions, pulse > 120       Mild; not concerning to patient  5. RECURRENT SYMPTOM: \"Have you had difficulty breathing before?\" If so, ask: \"When was the last time?\" and \"What happened that time?\"       no  6. CARDIAC HISTORY: \"Do you have any history of heart disease?\" (e.g., heart attack, angina, bypass surgery, angioplasty)       none  7. LUNG HISTORY: \"Do you have any history of lung disease?\"  (e.g., pulmonary embolus, asthma, emphysema)      none  8. CAUSE: \"What do you think is causing the " "breathing problem?\"       COVID-19  9. OTHER SYMPTOMS: \"Do you have any other symptoms? (e.g., dizziness, runny nose, cough, chest pain, fever)      resolved  10. PREGNANCY: \"Is there any chance you are pregnant?\" \"When was your last menstrual period?\"        None-has menses currently  11. TRAVEL: \"Have you traveled out of the country in the last month?\" (e.g., travel history, exposures)        no    Protocols used: BREATHING DIFFICULTY-A-AH      "

## 2022-01-27 ENCOUNTER — OFFICE VISIT (OUTPATIENT)
Dept: FAMILY MEDICINE | Facility: CLINIC | Age: 27
End: 2022-01-27
Payer: COMMERCIAL

## 2022-01-27 VITALS
DIASTOLIC BLOOD PRESSURE: 78 MMHG | OXYGEN SATURATION: 97 % | HEIGHT: 66 IN | BODY MASS INDEX: 29.57 KG/M2 | TEMPERATURE: 97.7 F | WEIGHT: 184 LBS | RESPIRATION RATE: 12 BRPM | SYSTOLIC BLOOD PRESSURE: 118 MMHG | HEART RATE: 72 BPM

## 2022-01-27 DIAGNOSIS — Z00.01 ENCOUNTER FOR ROUTINE ADULT MEDICAL EXAM WITH ABNORMAL FINDINGS: Primary | ICD-10-CM

## 2022-01-27 DIAGNOSIS — K64.4 EXTERNAL HEMORRHOIDS: ICD-10-CM

## 2022-01-27 DIAGNOSIS — Z23 NEEDS FLU SHOT: ICD-10-CM

## 2022-01-27 DIAGNOSIS — Z13.6 CARDIOVASCULAR SCREENING; LDL GOAL LESS THAN 160: ICD-10-CM

## 2022-01-27 DIAGNOSIS — Z23 HIGH PRIORITY FOR 2019 NOVEL CORONAVIRUS VACCINATION: ICD-10-CM

## 2022-01-27 DIAGNOSIS — F40.243 FEAR OF FLYING: ICD-10-CM

## 2022-01-27 DIAGNOSIS — L30.9 ECZEMA, UNSPECIFIED TYPE: ICD-10-CM

## 2022-01-27 DIAGNOSIS — F41.9 ANXIETY: ICD-10-CM

## 2022-01-27 DIAGNOSIS — Z11.59 NEED FOR HEPATITIS C SCREENING TEST: ICD-10-CM

## 2022-01-27 LAB
ERYTHROCYTE [DISTWIDTH] IN BLOOD BY AUTOMATED COUNT: 12.7 % (ref 10–15)
HCT VFR BLD AUTO: 37.9 % (ref 35–47)
HGB BLD-MCNC: 12.6 G/DL (ref 11.7–15.7)
MCH RBC QN AUTO: 28.1 PG (ref 26.5–33)
MCHC RBC AUTO-ENTMCNC: 33.2 G/DL (ref 31.5–36.5)
MCV RBC AUTO: 84 FL (ref 78–100)
PLATELET # BLD AUTO: 216 10E3/UL (ref 150–450)
RBC # BLD AUTO: 4.49 10E6/UL (ref 3.8–5.2)
WBC # BLD AUTO: 5.7 10E3/UL (ref 4–11)

## 2022-01-27 PROCEDURE — 99214 OFFICE O/P EST MOD 30 MIN: CPT | Mod: 25 | Performed by: FAMILY MEDICINE

## 2022-01-27 PROCEDURE — 90471 IMMUNIZATION ADMIN: CPT | Performed by: FAMILY MEDICINE

## 2022-01-27 PROCEDURE — 86803 HEPATITIS C AB TEST: CPT | Performed by: FAMILY MEDICINE

## 2022-01-27 PROCEDURE — 0064A COVID-19,PF,MODERNA (18+ YRS BOOSTER .25ML): CPT | Performed by: FAMILY MEDICINE

## 2022-01-27 PROCEDURE — 96127 BRIEF EMOTIONAL/BEHAV ASSMT: CPT | Performed by: FAMILY MEDICINE

## 2022-01-27 PROCEDURE — 91306 COVID-19,PF,MODERNA (18+ YRS BOOSTER .25ML): CPT | Performed by: FAMILY MEDICINE

## 2022-01-27 PROCEDURE — 36415 COLL VENOUS BLD VENIPUNCTURE: CPT | Performed by: FAMILY MEDICINE

## 2022-01-27 PROCEDURE — 85027 COMPLETE CBC AUTOMATED: CPT | Performed by: FAMILY MEDICINE

## 2022-01-27 PROCEDURE — 90686 IIV4 VACC NO PRSV 0.5 ML IM: CPT | Performed by: FAMILY MEDICINE

## 2022-01-27 PROCEDURE — 99395 PREV VISIT EST AGE 18-39: CPT | Mod: 25 | Performed by: FAMILY MEDICINE

## 2022-01-27 PROCEDURE — 80061 LIPID PANEL: CPT | Performed by: FAMILY MEDICINE

## 2022-01-27 RX ORDER — CITALOPRAM HYDROBROMIDE 20 MG/1
20 TABLET ORAL DAILY
Qty: 90 TABLET | Refills: 1 | Status: SHIPPED | OUTPATIENT
Start: 2022-01-27 | End: 2022-09-22

## 2022-01-27 RX ORDER — TRIAMCINOLONE ACETONIDE 5 MG/G
CREAM TOPICAL 2 TIMES DAILY
Qty: 45 G | Refills: 2 | Status: SHIPPED | OUTPATIENT
Start: 2022-01-27 | End: 2024-01-25

## 2022-01-27 RX ORDER — LORAZEPAM 0.5 MG/1
TABLET ORAL
Qty: 8 TABLET | Refills: 1 | Status: SHIPPED | OUTPATIENT
Start: 2022-01-27 | End: 2022-09-23

## 2022-01-27 ASSESSMENT — ENCOUNTER SYMPTOMS
SHORTNESS OF BREATH: 0
ARTHRALGIAS: 0
DYSURIA: 0
FREQUENCY: 0
HEARTBURN: 0
NERVOUS/ANXIOUS: 0
JOINT SWELLING: 0
CHILLS: 0
SORE THROAT: 0
CONSTIPATION: 0
DIARRHEA: 0
PARESTHESIAS: 0
COUGH: 0
EYE PAIN: 0
FEVER: 0
HEMATURIA: 0
DIZZINESS: 0
MYALGIAS: 0
NAUSEA: 0
ABDOMINAL PAIN: 0
WEAKNESS: 0
HEMATOCHEZIA: 1
HEADACHES: 0
PALPITATIONS: 0

## 2022-01-27 ASSESSMENT — ANXIETY QUESTIONNAIRES
5. BEING SO RESTLESS THAT IT IS HARD TO SIT STILL: NOT AT ALL
6. BECOMING EASILY ANNOYED OR IRRITABLE: NOT AT ALL
2. NOT BEING ABLE TO STOP OR CONTROL WORRYING: NOT AT ALL
GAD7 TOTAL SCORE: 1
1. FEELING NERVOUS, ANXIOUS, OR ON EDGE: SEVERAL DAYS
4. TROUBLE RELAXING: NOT AT ALL
GAD7 TOTAL SCORE: 1
7. FEELING AFRAID AS IF SOMETHING AWFUL MIGHT HAPPEN: NOT AT ALL
7. FEELING AFRAID AS IF SOMETHING AWFUL MIGHT HAPPEN: NOT AT ALL
GAD7 TOTAL SCORE: 1
3. WORRYING TOO MUCH ABOUT DIFFERENT THINGS: NOT AT ALL

## 2022-01-27 ASSESSMENT — PATIENT HEALTH QUESTIONNAIRE - PHQ9
SUM OF ALL RESPONSES TO PHQ QUESTIONS 1-9: 2
10. IF YOU CHECKED OFF ANY PROBLEMS, HOW DIFFICULT HAVE THESE PROBLEMS MADE IT FOR YOU TO DO YOUR WORK, TAKE CARE OF THINGS AT HOME, OR GET ALONG WITH OTHER PEOPLE: SOMEWHAT DIFFICULT
SUM OF ALL RESPONSES TO PHQ QUESTIONS 1-9: 2

## 2022-01-27 ASSESSMENT — PAIN SCALES - GENERAL: PAINLEVEL: NO PAIN (0)

## 2022-01-27 ASSESSMENT — MIFFLIN-ST. JEOR: SCORE: 1586.37

## 2022-01-27 NOTE — PROGRESS NOTES
SUBJECTIVE:   CC: Micki Thorpe is an 26 year old woman who presents for preventive health visit and the following other medical problems:      1. Encounter for routine adult medical exam with abnormal findings    2. Needs flu shot    3. High priority for 2019 novel coronavirus vaccination    4. Anxiety    5. Fear of flying    6. CARDIOVASCULAR SCREENING; LDL GOAL LESS THAN 160    7. External hemorrhoids    8. Eczema, unspecified type- bilateral antecubital areas, right lateral abdomen and left upper chest     9. Need for hepatitis C screening test          Patient has been advised of split billing requirements and indicates understanding: Yes  Healthy Habits:     Getting at least 3 servings of Calcium per day:  Yes    Bi-annual eye exam:  Yes    Dental care twice a year:  Yes    Sleep apnea or symptoms of sleep apnea:  None    Diet:  Breakfast skipped    Frequency of exercise:  1 day/week    Duration of exercise:  15-30 minutes    Taking medications regularly:  Yes    Medication side effects:  None    PHQ-2 Total Score: 0    Additional concerns today:  Yes    Rash mostly insides of elbows , stomach and back, occurs off and on since Summer 2021 - ? Eczema - progressively getting worse.     Hemorrhoids after the birth of last baby - flares from time to time.     Had COVID the end of Dec 2021- more than 14 days ago.      Anxiety Follow-Up- is flying again next week.     How are you doing with your anxiety since your last visit? Better but has a flight coming up so that makes it worse     Are you having other symptoms that might be associated with anxiety? No    Have you had a significant life event? No     Are you feeling depressed? No    Do you have any concerns with your use of alcohol or other drugs? No    Social History     Tobacco Use     Smoking status: Never Smoker     Smokeless tobacco: Never Used   Vaping Use     Vaping Use: Never used   Substance Use Topics     Alcohol use: Not Currently     Drug use:  No     ZULMA-7 SCORE 7/8/2021 9/13/2021 1/27/2022   Total Score - - -   Total Score - - 1 (minimal anxiety)   Total Score 5 1 1   Some encounter information is confidential and restricted. Go to Review [employer] activity to see all data.     PHQ 7/8/2021 9/13/2021 1/27/2022   PHQ-9 Total Score 2 0 2   Q9: Thoughts of better off dead/self-harm past 2 weeks Not at all Not at all Not at all     Last PHQ-9 1/27/2022   1.  Little interest or pleasure in doing things 0   2.  Feeling down, depressed, or hopeless 0   3.  Trouble falling or staying asleep, or sleeping too much 1   4.  Feeling tired or having little energy 1   5.  Poor appetite or overeating 0   6.  Feeling bad about yourself 0   7.  Trouble concentrating 0   8.  Moving slowly or restless 0   Q9: Thoughts of better off dead/self-harm past 2 weeks 0   PHQ-9 Total Score 2   Difficulty at work, home, or with people -     ZULMA-7  1/27/2022   1. Feeling nervous, anxious, or on edge 1   2. Not being able to stop or control worrying 0   3. Worrying too much about different things 0   4. Trouble relaxing 0   5. Being so restless that it is hard to sit still 0   6. Becoming easily annoyed or irritable 0   7. Feeling afraid, as if something awful might happen 0   ZULMA-7 Total Score 1   If you checked any problems, how difficult have they made it for you to do your work, take care of things at home, or get along with other people? -   Some encounter information is confidential and restricted. Go to Review [employer] activity to see all data.         Today's PHQ-2 Score:   PHQ-2 ( 1999 Pfizer) 1/27/2022   Q1: Little interest or pleasure in doing things -   Q2: Feeling down, depressed or hopeless -   PHQ-2 Score -   PHQ-2 Total Score (12-17 Years)- Positive if 3 or more points; Administer PHQ-A if positive -   PHQ-2 Score Incomplete       Abuse: Current or Past (Physical, Sexual or Emotional) - No  Do you feel safe in your environment? Yes    Have you ever done Advance Care  Planning? (For example, a Health Directive, POLST, or a discussion with a medical provider or your loved ones about your wishes): No, advance care planning information given to patient to review.  Patient plans to discuss their wishes with loved ones or provider.      Social History     Tobacco Use     Smoking status: Never Smoker     Smokeless tobacco: Never Used   Substance Use Topics     Alcohol use: Not Currently     If you drink alcohol do you typically have >3 drinks per day or >7 drinks per week? No    Alcohol Use 4/23/2013   Prescreen: >3 drinks/day or >7 drinks/week? no       Reviewed orders with patient.  Reviewed health maintenance and updated orders accordingly - Yes  Lab work is in process  Labs reviewed in EPIC  BP Readings from Last 3 Encounters:   01/27/22 118/78   07/16/20 122/84   06/22/20 128/82    Wt Readings from Last 3 Encounters:   01/27/22 83.5 kg (184 lb)   06/22/20 85.3 kg (188 lb)   05/29/20 83.5 kg (184 lb)                  Patient Active Problem List   Diagnosis     Blood type AB+ - no need for rhogam     Encounter for supervision of other normal pregnancy, third trimester     CARDIOVASCULAR SCREENING; LDL GOAL LESS THAN 160     Unplanned wanted pregnancy - x 2- both on ocp's - 2nd on micronor      Cervical high risk HPV (human papillomavirus) test positive     Anxiety     Irritability and anger     Dysplastic nevus of trunk - Compound dysplastic nevus with moderate to severe atypia s/p MOHS procedure - Dr. Clay      Benign paroxysmal positional vertigo of right ear     Strain of neck muscle, initial encounter     Indication for care in labor or delivery     Encounter for elective induction of labor     Elevated blood pressure reading without diagnosis of hypertension     Past Surgical History:   Procedure Laterality Date     APPENDECTOMY  3/09    dr francois     HC REMOVE TONSILS/ADENOIDS,<13 Y/O  10/01    T & A <11y.o.     MOHS MICROGRAPHIC PROCEDURE      left upper abd -Compound  dysplastic nevus with moderate to severe atypia        Social History     Tobacco Use     Smoking status: Never Smoker     Smokeless tobacco: Never Used   Substance Use Topics     Alcohol use: Not Currently     Family History   Problem Relation Age of Onset     Lipids Mother      Lipids Father      Diabetes Paternal Grandmother      C.A.D. Maternal Grandfather      Lipids Maternal Grandfather      Diabetes Maternal Grandfather      Diabetes Maternal Grandmother      Lipids Maternal Grandmother      C.A.D. Maternal Grandmother      Cancer - colorectal Maternal Grandmother 73     Skin Cancer Maternal Aunt      Familial Atypical Mole-Malignant Melanoma Syndrome Paternal Cousin          Current Outpatient Medications   Medication Sig Dispense Refill     citalopram (CELEXA) 20 MG tablet Take 1 tablet (20 mg) by mouth daily 90 tablet 1     LORazepam (ATIVAN) 0.5 MG tablet Take 1-2 tabs 30minutes prior to flight - no alcohol or other sedating meds within 8 hours of this medication 8 tablet 1     No Known Allergies  Recent Labs   Lab Test 05/06/20  1248 05/02/20  1300 10/05/18  1509 03/08/17  1646 12/14/15  1659   ALT 43 19  --   --  15   CR 0.66 0.55  --   --   --    GFRESTIMATED >90 >90  --   --   --    GFRESTBLACK >90 >90  --   --   --    POTASSIUM 4.0 4.0  --   --   --    TSH  --   --  2.11 0.94  --       Wt Readings from Last 5 Encounters:   01/27/22 83.5 kg (184 lb)   06/22/20 85.3 kg (188 lb)   05/29/20 83.5 kg (184 lb)   05/11/20 85.3 kg (188 lb)   05/06/20 86.6 kg (191 lb)       Breast Cancer Screening:  Any new diagnosis of family breast, ovarian, or bowel cancer? No    FHS-7: No flowsheet data found.  click delete button to remove this line now  Patient under 40 years of age: Routine Mammogram Screening not recommended.   Pertinent mammograms are reviewed under the imaging tab.    History of abnormal Pap smear: NO - age 30- 65 PAP every 3 years recommended  PAP / HPV Latest Ref Rng & Units 6/22/2020 10/3/2019  2016   PAP (Historical) - NIL NIL NIL   HPV16 NEG:Negative Negative Negative -   HPV18 NEG:Negative Negative Negative -   HRHPV NEG:Negative Negative Negative -     Reviewed and updated as needed this visit by clinical staff                Reviewed and updated as needed this visit by Provider               Past Medical History:   Diagnosis Date     Blood type AB+      Cervical high risk HPV (human papillomavirus) test positive 2015    age20, normal pap     Depressive disorder 3/2017      (normal spontaneous vaginal delivery) 2014     Status post induction of labor for low ALBERT and post-dates 2014     Streptococcus infection in conditions classified elsewhere and of unspecified site, group A     h/o     Unplanned wanted pregnancy     x 2- both on ocp's - 2nd on micronor       Past Surgical History:   Procedure Laterality Date     APPENDECTOMY  3/09    dr francois     HC REMOVE TONSILS/ADENOIDS,<11 Y/O  10/01    T & A <11y.o.     MOHS MICROGRAPHIC PROCEDURE      left upper abd -Compound dysplastic nevus with moderate to severe atypia      OB History    Para Term  AB Living   3 3 3 0 0 3   SAB IAB Ectopic Multiple Live Births   0 0 0 0 3      # Outcome Date GA Lbr Rufino/2nd Weight Sex Delivery Anes PTL Lv   3 Term 20 39w6d 02:22 / 00:14 3.57 kg (7 lb 13.9 oz) F Vag-Spont EPI N JEFFERY      Name: Pavan Holman       Apgar1: 6  Apgar5: 9   2 Term 16 40w5d 04:13 / 00:33 3.32 kg (7 lb 5.1 oz) M Vag-Spont EPI, IV  JEFFERY      Birth Comments: none - long umbilical cord - had CAN x 1 reduced prior to delivery of fetal shoulders and loose cord around left leg x 2       Name: Jefry Holman       Apgar1: 8  Apgar5: 9   1 Term 14 40w3d 03:14 / 01:42 3.175 kg (7 lb) F Vag-Spont EPI, IV  JEFFERY      Birth Comments: none       Name: Lotus Holman       Apgar1: 9  Apgar5: 9       Review of Systems   Constitutional: Negative for chills and fever.   HENT: Negative for congestion, ear pain,  "hearing loss and sore throat.    Eyes: Negative for pain and visual disturbance.   Respiratory: Negative for cough and shortness of breath.    Cardiovascular: Negative for chest pain, palpitations and peripheral edema.   Gastrointestinal: Positive for hematochezia. Negative for abdominal pain, constipation, diarrhea, heartburn and nausea.   Genitourinary: Negative for dysuria, frequency, genital sores, hematuria and urgency.   Musculoskeletal: Negative for arthralgias, joint swelling and myalgias.   Skin: Positive for rash.   Neurological: Negative for dizziness, weakness, headaches and paresthesias.   Psychiatric/Behavioral: Negative for mood changes. The patient is not nervous/anxious.       OBJECTIVE:   /78   Pulse 72   Temp 97.7  F (36.5  C) (Tympanic)   Resp 12   Ht 1.676 m (5' 6\")   Wt 83.5 kg (184 lb)   LMP 01/18/2022 (Approximate)   SpO2 97%   BMI 29.70 kg/m    Physical Exam  GENERAL: healthy, alert and no distress  EYES: Eyes grossly normal to inspection, PERRL and conjunctivae and sclerae normal  HENT: ear canals and TM's normal, nose and mouth without ulcers or lesions  NECK: no adenopathy, no asymmetry, masses, or scars and thyroid normal to palpation  RESP: lungs clear to auscultation - no rales, rhonchi or wheezes  BREAST: normal without masses, tenderness or nipple discharge and no palpable axillary masses or adenopathy  CV: regular rate and rhythm, normal S1 S2, no S3 or S4, no murmur, click or rub, no peripheral edema and peripheral pulses strong  ABDOMEN: soft, nontender, no hepatosplenomegaly, no masses and bowel sounds normal  RECTAL: normal sphincter tone, there are 3 external hemorrhoids noted - 2 are compressed tags, 1 active , but nonthrombosed not actively bleeding external hemorrhoid.   MS: no gross musculoskeletal defects noted, no edema  SKIN:eczematous reddish macular papular rash on inner elbow area, left upper chest and right lateral lower abdomen - no suspicious lesions " or rashes. Many pigmented nevi - pt has appt for full body skin exam with Alla Mao PA-C 3/2022.    NEURO: Normal strength and tone, mentation intact and speech normal  PSYCH: mentation appears normal, affect normal/bright    Diagnostic Test Results:  Labs reviewed in Epic    ASSESSMENT/PLAN:       ICD-10-CM    1. Encounter for routine adult medical exam with abnormal findings  Z00.01    2. Needs flu shot  Z23 INFLUENZA VACCINE IM > 6 MONTHS VALENT IIV4 (AFLURIA/FLUZONE)     VACCINE ADMINISTRATION, INITIAL   3. High priority for 2019 novel coronavirus vaccination  Z23    4. Anxiety  F41.9 citalopram (CELEXA) 20 MG tablet     OFFICE/OUTPT VISIT,EST,LEVL IV   5. Fear of flying  F40.243 LORazepam (ATIVAN) 0.5 MG tablet     OFFICE/OUTPT VISIT,EST,LEVL IV   6. CARDIOVASCULAR SCREENING; LDL GOAL LESS THAN 160  Z13.6 CBC with platelets     Lipid panel reflex to direct LDL Fasting     CBC with platelets     Lipid panel reflex to direct LDL Fasting   7. External hemorrhoids  K64.4 Colorectal Surgery Referral     OFFICE/OUTPT VISIT,EST,LEVL IV   8. Eczema, unspecified type- bilateral antecubital areas, right lateral abdomen and left upper chest   L30.9 triamcinolone (ARISTOCORT HP) 0.5 % external cream     OFFICE/OUTPT VISIT,EST,LEVL IV   9. Need for hepatitis C screening test  Z11.59 Hepatitis C Screen Reflex to HCV RNA Quant and Genotype     Hepatitis C Screen Reflex to HCV RNA Quant and Genotype     Please, call our clinic or go to the ER immediately if signs or symptoms worsen or fail to improve as anticipated.     Has been washing with a lavendar Aveeno body wash - recommended:   For your skin rash, eczema or dry or sensitive skin:     Change to Dove bar soap for sensitive skin or fragrance free Dove Bar soap or CeraVe Hydrating Cleanser or Neutrogena Hydroboost fragrance free hydrating cleanser -both the latter two are  a cream type  cleansers that don't  foam at all,  Fragrance-free and dye free detergents,  "eliminate all  fabric softeners (liquid or sheet). Try 2 tablespoons of vinegar in your fabric softener dispenser or rinse cycle and wool dryer balls to soften your clothing and linens instead.     Once lesions clear, keep skin well moisturized with  CeraVe moisturizer (in the jar) or CeraVe healing ointment or Cetaphil RestoraDerm. -  great, non-irritating  Fragrance  free moisturizers that helps lock in skin moisture.      Stop scratching!   We need to break the itch/scratch cycle.  Ok to use claritin 10mg daily or other nonsedating anti-histamine , such as Allegra 180mg daily , over the counter  to help with itching.  May use those twice daily , if needed to help with the itching.  If those don't work, then try zyrtec 10mg up to twice daily.   Cold packs help also.  Cold is a natural anti-histamine and may help your itching.  Heat will make things worse.  Can use Benadryl 25mg at night for breakthrough itching.  Benadryl will make you sleepy and can cause a mild morning hangover feeling.     There is a  3 minute  time-frame for skin hydration/moisturization for maximal moisture retention after bathing or showering.   After your rash has healed, bathe in lukewarm to warm  - not hot water- that dries out the skin too much.  Bathe/shower only every other day, if needed.  Use your cleanser only in your \"stinky\" areas - armpits, private areas, etc., when you bathe, just use water on your other body parts. On your non-  bathing days, use a moist washcloth or spritz bottle to moisten your skin prior to moisturizing.   Patient has been advised of split billing requirements and indicates understanding: Yes    COUNSELING:  Reviewed preventive health counseling, as reflected in patient instructions    Estimated body mass index is 30.34 kg/m  as calculated from the following:    Height as of 6/22/20: 1.676 m (5' 6\").    Weight as of 6/22/20: 85.3 kg (188 lb).    Weight management plan: Discussed healthy diet and exercise " guidelines    She reports that she has never smoked. She has never used smokeless tobacco.      Counseling Resources:  ATP IV Guidelines  Pooled Cohorts Equation Calculator  Breast Cancer Risk Calculator  BRCA-Related Cancer Risk Assessment: FHS-7 Tool  FRAX Risk Assessment  ICSI Preventive Guidelines  Dietary Guidelines for Americans, 2010  Angella Joy's MyPlate  ASA Prophylaxis  Lung CA Screening         Answers for HPI/ROS submitted by the patient on 1/27/2022  If you checked off any problems, how difficult have these problems made it for you to do your work, take care of things at home, or get along with other people?: Somewhat difficult  PHQ9 TOTAL SCORE: 2  ZULMA 7 TOTAL SCORE: 1      Ashwini Garcia MD  [facility]

## 2022-01-27 NOTE — PATIENT INSTRUCTIONS
[facility]  [address] [place] [id number]  Office: [phone]   Fax:    [phone]       For possible COVID-19 novel coronavirus vaccine or other vaccine mild reactions not related to allergies:   I would not take the Ibuprofen prior to your shot, but rather afterward.  No prescription steroids within 2 weeks of the shots as they can suppress your immune system, but Ibuprofen is not a significant immunosuppressant at the 400mg dosage.   Ok to take tylenol 2-325 or 2-500mg tabs prior to vaccine.  Ok to also take claritin (a non-sedating anti-histamine) 10 mg daily for 2 days and 2 otc Ibuprofen every 4-6 hours while awake for the next 36 hours and I had no problems.  There has been some controversy about whether to take Ibuprofen 400mg by mouth every 4-5 hours or naproxen 220mg by mouth twice daily  ,but nothing concrete from the CDC or WHO to recommend against taking those medications.  If you do take them, take them with food so they don't irritate your stomach.  You can do the above regimen for 2-3 after your first,  Second, or third  shots to decrease the possibility of achiness, fever, chills after your COVID-19 novel coronavirus vaccines.      Hope that helps!   Sincerely,  Ashwini Garcia MD       For your skin rash, eczema or dry or sensitive skin:     Change to Dove bar soap for sensitive skin or fragrance free Dove Bar soap or CeraVe Hydrating Cleanser or Neutrogena Hydroboost fragrance free hydrating cleanser -both the latter two are  a cream type  cleansers that don't  foam at all,  Fragrance-free and dye free detergents, eliminate all  fabric softeners (liquid or sheet). Try 2 tablespoons of vinegar in your fabric softener dispenser or rinse cycle and wool dryer balls to soften your clothing and linens instead.     Once lesions clear, keep skin well moisturized with  CeraVe moisturizer (in the jar) or CeraVe healing ointment or Cetaphil RestoraDerm. -   "great, non-irritating  Fragrance  free moisturizers that helps lock in skin moisture.      Stop scratching!   We need to break the itch/scratch cycle.  Ok to use claritin 10mg daily or other nonsedating anti-histamine , such as Allegra 180mg daily , over the counter  to help with itching.  May use those twice daily , if needed to help with the itching.  If those don't work, then try zyrtec 10mg up to twice daily.   Cold packs help also.  Cold is a natural anti-histamine and may help your itching.  Heat will make things worse.  Can use Benadryl 25mg at night for breakthrough itching.  Benadryl will make you sleepy and can cause a mild morning hangover feeling.     There is a  3 minute  time-frame for skin hydration/moisturization for maximal moisture retention after bathing or showering.   After your rash has healed, bathe in lukewarm to warm  - not hot water- that dries out the skin too much.  Bathe/shower only every other day, if needed.  Use your cleanser only in your \"stinky\" areas - armpits, private areas, etc., when you bathe, just use water on your other body parts. On your non-  bathing days, use a moist washcloth or spritz bottle to moisten your skin prior to moisturizing.      Preventive Health Recommendations  Female Ages 26 - 39  Yearly exam:   See your health care provider every year in order to    Review health changes.     Discuss preventive care.      Review your medicines if you your doctor has prescribed any.    Until age 30: Get a Pap test every three years (more often if you have had an abnormal result).    After age 30: Talk to your doctor about whether you should have a Pap test every 3 years or have a Pap test with HPV screening every 5 years.   You do not need a Pap test if your uterus was removed (hysterectomy) and you have not had cancer.  You should be tested each year for STDs (sexually transmitted diseases), if you're at risk.   Talk to your provider about how often to have your " cholesterol checked.  If you are at risk for diabetes, you should have a diabetes test (fasting glucose).  Shots: Get a flu shot each year. Get a tetanus shot every 10 years.   Nutrition:     Eat at least 5 servings of fruits and vegetables each day.    Eat whole-grain bread, whole-wheat pasta and brown rice instead of white grains and rice.    Get adequate Calcium and Vitamin D.     Lifestyle    Exercise at least 150 minutes a week (30 minutes a day, 5 days of the week). This will help you control your weight and prevent disease.    Limit alcohol to one drink per day.    No smoking.     Wear sunscreen to prevent skin cancer.    See your dentist every six months for an exam and cleaning.    Thank you so much or choosing [facility]  for your Health Care. It was a pleasure seeing you at your visit today! Please contact us with any questions or concerns you may have.                   Ashwini Garcia MD                              To reach your [facility] care team after hours call:   [phone]    PLEASE NOTE OUR HOURS HAVE CHANGED secondary to COVID-19 coronavirus pandemic, as we are trying to minimize patient exposure to the virus,  which is now widespread in the [facility].  These hours may change with very little notice.  We apologize for any inconvenience.       Our current clinic hours are:          Monday- Thursday   7:00am - 6:00pm  in person.      Friday  7:00am- 5:00pm                       Saturday and Sunday : Closed to in person and virtual visits        We have telephone and virtual visit times available between    7:00am - 6pm on Monday-Friday as well.                                                Phone:  [phone]      Our pharmacy hours: Monday through Friday 8:00am to 5:00pm                        Saturday - 9:00 am to 12 noon       Sunday : Closed.              Phone:  [phone]              ###  Please note: at this time we are not  accepting any walk-in visits. ###      There is also information available at our web site:  [email or web address]    If your provider ordered any lab tests and you do not receive the results within 10 business days, please call the clinic.    If you need a medication refill please contact your pharmacy.  Please allow 3 business days for your refill to be completed.    Our clinic offers telephone visits and e visits.  Please ask one of your team members to explain more.      Use [employer] (secure email communication and access to your chart) to send your primary care provider a message or make an appointment. Ask someone on your Team how to sign up for [employer].                 Thank you so much or choosing [facility]  for your Health Care. It was a pleasure seeing you at your visit today! Please contact us with any questions or concerns you may have.                   Ashwini Garcia MD                              To reach your [facility] care team after hours call:   [phone]    PLEASE NOTE OUR HOURS HAVE CHANGED secondary to COVID-19 coronavirus pandemic, as we are trying to minimize patient exposure to the virus,  which is now widespread in the [facility].  These hours may change with very little notice.  We apologize for any inconvenience.       Our current clinic hours are:          Monday- Thursday   7:00am - 6:00pm  in person.      Friday  7:00am- 5:00pm                       Saturday and Sunday : Closed to in person and virtual visits        We have telephone and virtual visit times available between    7:00am - 6pm on Monday-Friday as well.                                                Phone:  [phone]      Our pharmacy hours: Monday through Friday 8:00am to 5:00pm                        Saturday - 9:00 am to 12 noon       Sunday : Closed.              Phone:  [phone]              ###  Please note: at this time we are not accepting any walk-in  visits. ###      There is also information available at our web site:  [email or web address]    If your provider ordered any lab tests and you do not receive the results within 10 business days, please call the clinic.    If you need a medication refill please contact your pharmacy.  Please allow 3 business days for your refill to be completed.    Our clinic offers telephone visits and e visits.  Please ask one of your team members to explain more.      Use [employer] (secure email communication and access to your chart) to send your primary care provider a message or make an appointment. Ask someone on your Team how to sign up for [employer].

## 2022-01-28 LAB
CHOLEST SERPL-MCNC: 141 MG/DL
FASTING STATUS PATIENT QL REPORTED: YES
HCV AB SERPL QL IA: NONREACTIVE
HDLC SERPL-MCNC: 47 MG/DL
LDLC SERPL CALC-MCNC: 82 MG/DL
NONHDLC SERPL-MCNC: 94 MG/DL
TRIGL SERPL-MCNC: 61 MG/DL

## 2022-01-28 ASSESSMENT — ANXIETY QUESTIONNAIRES: GAD7 TOTAL SCORE: 1

## 2022-02-17 ENCOUNTER — OFFICE VISIT (OUTPATIENT)
Dept: FAMILY MEDICINE | Facility: CLINIC | Age: 27
End: 2022-02-17
Payer: COMMERCIAL

## 2022-02-17 VITALS — DIASTOLIC BLOOD PRESSURE: 72 MMHG | SYSTOLIC BLOOD PRESSURE: 118 MMHG

## 2022-02-17 DIAGNOSIS — L81.4 LENTIGINES: ICD-10-CM

## 2022-02-17 DIAGNOSIS — L82.1 SEBORRHEIC KERATOSES: ICD-10-CM

## 2022-02-17 DIAGNOSIS — D22.9 ATYPICAL NEVUS: ICD-10-CM

## 2022-02-17 DIAGNOSIS — D48.9 NEOPLASM OF UNCERTAIN BEHAVIOR: Primary | ICD-10-CM

## 2022-02-17 DIAGNOSIS — D18.01 CHERRY ANGIOMA: ICD-10-CM

## 2022-02-17 DIAGNOSIS — D22.4 MELANOCYTIC NEVUS OF SCALP: ICD-10-CM

## 2022-02-17 DIAGNOSIS — D48.5 NEOPLASM OF UNCERTAIN BEHAVIOR OF SKIN: ICD-10-CM

## 2022-02-17 DIAGNOSIS — D22.9 MULTIPLE BENIGN NEVI: ICD-10-CM

## 2022-02-17 PROCEDURE — 11102 TANGNTL BX SKIN SINGLE LES: CPT | Performed by: PHYSICIAN ASSISTANT

## 2022-02-17 PROCEDURE — 99214 OFFICE O/P EST MOD 30 MIN: CPT | Mod: 25 | Performed by: PHYSICIAN ASSISTANT

## 2022-02-17 PROCEDURE — 88305 TISSUE EXAM BY PATHOLOGIST: CPT | Performed by: DERMATOLOGY

## 2022-02-17 NOTE — LETTER
2/17/2022         RE: Micki Thorpe  [address] [facility] [address] [address]        Dear Colleague,    Thank you for referring your patient, Micki Thorpe, to the [facility]. Please see a copy of my visit note below.    [facility]  Encounter Date: Feb 17, 2022  Office Visit   ____________________________________________    Assessment & Plan:    1. Neoplasm of uncertain behavior, right scapula 0.6 cm  Ddx irritated benign nevus vs acrochordon  TANGENTIAL BIOPSY:  After consent, anesthesia with LEC and prep, tangential biopsy performed.  No complications and routine wound care.  May grow back and will get a scar. Based on lesion type may need to completely remove lesion. Patient will be notified in 7-10 days of results. Wound care directions given.     2. Lentigines, multiple benign nevi, cherry angiomas, and dermatofibromas  Treatment of these lesions would be purely cosmetic and not medically necessary.  Lesion may recur and/or may not completely resolve. May need additional treatment.  Different removal options including excision, cryotherapy, cautery and /or laser.       3. Severely atypical nevus on left abdomen sp excision 9/12/20.  No evidence of recurrence    4. Predominantly intradermal melanocytic nevus  Right crown of scalp s/p biopsy 7/16/2020  Benign - watch and monitor.    Signs and Symptoms of non-melanoma skin cancer and ABCDEs of melanoma reviewed with patient. Patient encouraged to perform monthly self skin exams and educated on how to perform them. UV precautions reviewed with patient. Patient was asked about new or changing moles/lesions on body.   Wear a sunscreen with at least SPF 30 on your face, ears, neck and V of the chest daily. Wear sunscreen on other areas of the body if those areas are exposed to the sun throughout the day. Sunscreens can contain physical and/or chemical blockers. Physical blockers are less likely to clog pores, these  include zinc oxide and titanium dioxide. Reapply every two hour and after swimming. Sunscreen examples include Neutrogena, CeraVe, Blue Lizard, Elta MD and many others.       Follow-up: Pending path results, otherwise 6-12 months.    Labs and office visit notes reviewed:  7/16/2020 derm    Provider Disclosure:   The documentation recorded by the scribe accurately reflects the services I personally performed and the decisions made by me.    Alla Mao, MS, MIAN      Scribe Disclosure:  I, Yumiko Patrick, am serving as a scribe to document services personally performed by Alla Mao PA-C based on data collection and the provider's statements to me.   ____________________________________________________    HPI:  Ms. Micki Thorpe is a(n) 26 year old female who presents today as a return patient for a full skin exam, noting a raised spot on her back. Patient states this has been present for a while.  Patient reports the following symptoms: flares up occasionally, becomes painful and bleeds easily.  Patient reports the following previous treatments: none.  Patient reports the following modifying factors: none.  Associated symptoms: none. Patient also notes eczema flare up in the last few months which became quite extensive, at which time she was evaluated by her primary care provider and prescribed triamcinolone cream. This helped clear her skin and she has no complaints at this time. Patient has no other skin complaints today.  Remainder of the HPI, Meds, PMH, Allergies, FH, and SH was reviewed in chart.       Pertinent findings: Severely atypical nevus on left abdomen sp excision 9/12/20. No personal or family history of skin cancer      Medications:  Current Outpatient Medications   Medication     citalopram (CELEXA) 20 MG tablet     LORazepam (ATIVAN) 0.5 MG tablet     triamcinolone (ARISTOCORT HP) 0.5 % external cream     No current facility-administered medications for this visit.        Past  Medical History:   Patient Active Problem List   Diagnosis     Blood type AB+ - no need for rhogam     Encounter for supervision of other normal pregnancy, third trimester     CARDIOVASCULAR SCREENING; LDL GOAL LESS THAN 160     Unplanned wanted pregnancy - x 2- both on ocp's - 2nd on micronor      Cervical high risk HPV (human papillomavirus) test positive     Anxiety     Irritability and anger     Dysplastic nevus of trunk - Compound dysplastic nevus with moderate to severe atypia s/p MOHS procedure - Dr. Clay      Benign paroxysmal positional vertigo of right ear     Strain of neck muscle, initial encounter     Indication for care in labor or delivery     Encounter for elective induction of labor     Elevated blood pressure reading without diagnosis of hypertension     Eczema, unspecified type- bilateral antecubital areas, right lateral abdomen and left upper chest      Fear of flying     Needs flu shot     Past Medical History:   Diagnosis Date     Blood type AB+      Cervical high risk HPV (human papillomavirus) test positive 2015    age20, normal pap     Depressive disorder 3/2017      (normal spontaneous vaginal delivery) 2014     Status post induction of labor for low ALBERT and post-dates 2014     Streptococcus infection in conditions classified elsewhere and of unspecified site, group A     h/o     Unplanned wanted pregnancy     x 2- both on ocp's - 2nd on micronor        Past Surgical History:   Past Surgical History:   Procedure Laterality Date     APPENDECTOMY  3/09    dr francois     HC REMOVE TONSILS/ADENOIDS,<13 Y/O  10/01    T & A <11y.o.     MOHS MICROGRAPHIC PROCEDURE      left upper abd -Compound dysplastic nevus with moderate to severe atypia        Family History:  Family History   Problem Relation Age of Onset     Lipids Mother      Lipids Father      Diabetes Paternal Grandmother      C.A.D. Maternal Grandfather      Lipids Maternal Grandfather      Diabetes Maternal Grandfather       Diabetes Maternal Grandmother      Lipids Maternal Grandmother      LESLYE. Maternal Grandmother      Cancer - colorectal Maternal Grandmother 73     Skin Cancer Maternal Aunt      Familial Atypical Mole-Malignant Melanoma Syndrome Paternal Cousin        Social History:  Social History     Tobacco Use     Smoking status: Never Smoker     Smokeless tobacco: Never Used   Substance Use Topics     Alcohol use: Not Currently          Review Of Systems:  Skin: raised mole on back  Eyes: negative  Ears/Nose/Throat: negative  Respiratory: No shortness of breath, dyspnea on exertion, cough, or hemoptysis  Cardiovascular: negative  Gastrointestinal: negative  Genitourinary: negative  Musculoskeletal: negative  Neurologic: negative  Psychiatric: negative  Hematologic/Lymphatic/Immunologic: negative  Endocrine: negative      Objective:     LMP 01/18/2022 (Approximate)   Eyes: Conjunctivae/lids: Normal   ENT: Lips:  Normal  MSK: Normal  Cardiovascular: Peripheral edema none  Pulm: Breathing Normal  Neuro/Psych: Orientation: Normal; Mood/Affect: Normal, NAD, WDWN  Pt accompanied by: self  Following areas examined: Scalp, face, eyelids, lips, neck, chest, abdomen, back, buttocks, and R&L upper and lower extremities. Pt defers exam of groin and genitals.   Nodes: inguinal NLAD  Pope skin type: II   Findings:  Right scapula brown and tan/brown fleshy papule 0.6 cm  Smooth brown/red macule with positive dimple sign on the right distal thigh and left lateral thigh  Red smooth well-defined macules on trunk and extremities.  Well circumscribed macules with symmetric color distribution on trunk and extremities.  Tan WD smooth macules on face, neck, trunk, and extremities.                Again, thank you for allowing me to participate in the care of your patient.        Sincerely,        Alla Mao PA-C

## 2022-02-17 NOTE — PROGRESS NOTES
[facility]  Encounter Date: Feb 17, 2022  Office Visit   ____________________________________________    Assessment & Plan:    1. Neoplasm of uncertain behavior, right scapula 0.6 cm  Ddx irritated benign nevus vs acrochordon  TANGENTIAL BIOPSY:  After consent, anesthesia with LEC and prep, tangential biopsy performed.  No complications and routine wound care.  May grow back and will get a scar. Based on lesion type may need to completely remove lesion. Patient will be notified in 7-10 days of results. Wound care directions given.     2. Lentigines, multiple benign nevi, cherry angiomas, and dermatofibromas  Treatment of these lesions would be purely cosmetic and not medically necessary.  Lesion may recur and/or may not completely resolve. May need additional treatment.  Different removal options including excision, cryotherapy, cautery and /or laser.       3. Severely atypical nevus on left abdomen sp excision 9/12/20.  No evidence of recurrence    4. Predominantly intradermal melanocytic nevus  Right crown of scalp s/p biopsy 7/16/2020  Benign - watch and monitor.    Signs and Symptoms of non-melanoma skin cancer and ABCDEs of melanoma reviewed with patient. Patient encouraged to perform monthly self skin exams and educated on how to perform them. UV precautions reviewed with patient. Patient was asked about new or changing moles/lesions on body.   Wear a sunscreen with at least SPF 30 on your face, ears, neck and V of the chest daily. Wear sunscreen on other areas of the body if those areas are exposed to the sun throughout the day. Sunscreens can contain physical and/or chemical blockers. Physical blockers are less likely to clog pores, these include zinc oxide and titanium dioxide. Reapply every two hour and after swimming. Sunscreen examples include Neutrogena, CeraVe, Blue Lizard, Elta MD and many others.       Follow-up: Pending path results, otherwise 6-12 months.    Labs and office visit  notes reviewed:  7/16/2020 derm    Provider Disclosure:   The documentation recorded by the scribe accurately reflects the services I personally performed and the decisions made by me.    Alla Mao, MS, MIAN      Scribe Disclosure:  I, Yumiko Nguyen, am serving as a scribe to document services personally performed by Alla Mao PA-C based on data collection and the provider's statements to me.   ____________________________________________________    HPI:  Ms. Micki Thorpe is a(n) 26 year old female who presents today as a return patient for a full skin exam, noting a raised spot on her back. Patient states this has been present for a while.  Patient reports the following symptoms: flares up occasionally, becomes painful and bleeds easily.  Patient reports the following previous treatments: none.  Patient reports the following modifying factors: none.  Associated symptoms: none. Patient also notes eczema flare up in the last few months which became quite extensive, at which time she was evaluated by her primary care provider and prescribed triamcinolone cream. This helped clear her skin and she has no complaints at this time. Patient has no other skin complaints today.  Remainder of the HPI, Meds, PMH, Allergies, FH, and SH was reviewed in chart.       Pertinent findings: Severely atypical nevus on left abdomen sp excision 9/12/20. No personal or family history of skin cancer      Medications:  Current Outpatient Medications   Medication     citalopram (CELEXA) 20 MG tablet     LORazepam (ATIVAN) 0.5 MG tablet     triamcinolone (ARISTOCORT HP) 0.5 % external cream     No current facility-administered medications for this visit.        Past Medical History:   Patient Active Problem List   Diagnosis     Blood type AB+ - no need for rhogam     Encounter for supervision of other normal pregnancy, third trimester     CARDIOVASCULAR SCREENING; LDL GOAL LESS THAN 160     Unplanned wanted pregnancy -  x 2- both on ocp's - 2nd on micronor      Cervical high risk HPV (human papillomavirus) test positive     Anxiety     Irritability and anger     Dysplastic nevus of trunk - Compound dysplastic nevus with moderate to severe atypia s/p MOHS procedure - Dr. Clay      Benign paroxysmal positional vertigo of right ear     Strain of neck muscle, initial encounter     Indication for care in labor or delivery     Encounter for elective induction of labor     Elevated blood pressure reading without diagnosis of hypertension     Eczema, unspecified type- bilateral antecubital areas, right lateral abdomen and left upper chest      Fear of flying     Needs flu shot     Past Medical History:   Diagnosis Date     Blood type AB+      Cervical high risk HPV (human papillomavirus) test positive 2015    age18, normal pap     Depressive disorder 3/2017      (normal spontaneous vaginal delivery) 2014     Status post induction of labor for low ALBERT and post-dates 2014     Streptococcus infection in conditions classified elsewhere and of unspecified site, group A     h/o     Unplanned wanted pregnancy     x 2- both on ocp's - 2nd on micronor        Past Surgical History:   Past Surgical History:   Procedure Laterality Date     APPENDECTOMY  3/09    dr francois      REMOVE TONSILS/ADENOIDS,<11 Y/O  10/01    T & A <11y.o.     MOHS MICROGRAPHIC PROCEDURE      left upper abd -Compound dysplastic nevus with moderate to severe atypia        Family History:  Family History   Problem Relation Age of Onset     Lipids Mother      Lipids Father      Diabetes Paternal Grandmother      C.A.D. Maternal Grandfather      Lipids Maternal Grandfather      Diabetes Maternal Grandfather      Diabetes Maternal Grandmother      Lipids Maternal Grandmother      C.A.D. Maternal Grandmother      Cancer - colorectal Maternal Grandmother 73     Skin Cancer Maternal Aunt      Familial Atypical Mole-Malignant Melanoma Syndrome Paternal Cousin         Social History:  Social History     Tobacco Use     Smoking status: Never Smoker     Smokeless tobacco: Never Used   Substance Use Topics     Alcohol use: Not Currently          Review Of Systems:  Skin: raised mole on back  Eyes: negative  Ears/Nose/Throat: negative  Respiratory: No shortness of breath, dyspnea on exertion, cough, or hemoptysis  Cardiovascular: negative  Gastrointestinal: negative  Genitourinary: negative  Musculoskeletal: negative  Neurologic: negative  Psychiatric: negative  Hematologic/Lymphatic/Immunologic: negative  Endocrine: negative      Objective:     LMP 01/18/2022 (Approximate)   Eyes: Conjunctivae/lids: Normal   ENT: Lips:  Normal  MSK: Normal  Cardiovascular: Peripheral edema none  Pulm: Breathing Normal  Neuro/Psych: Orientation: Normal; Mood/Affect: Normal, NAD, WDWN  Pt accompanied by: self  Following areas examined: Scalp, face, eyelids, lips, neck, chest, abdomen, back, buttocks, and R&L upper and lower extremities. Pt defers exam of groin and genitals.   Nodes: inguinal NLAD  Pope skin type: II   Findings:  Right scapula brown and tan/brown fleshy papule 0.6 cm  Smooth brown/red macule with positive dimple sign on the right distal thigh and left lateral thigh  Red smooth well-defined macules on trunk and extremities.  Well circumscribed macules with symmetric color distribution on trunk and extremities.  Tan WD smooth macules on face, neck, trunk, and extremities.

## 2022-02-21 LAB
PATH REPORT.COMMENTS IMP SPEC: NORMAL
PATH REPORT.COMMENTS IMP SPEC: NORMAL
PATH REPORT.FINAL DX SPEC: NORMAL
PATH REPORT.GROSS SPEC: NORMAL
PATH REPORT.MICROSCOPIC SPEC OTHER STN: NORMAL
PATH REPORT.RELEVANT HX SPEC: NORMAL

## 2022-03-02 ENCOUNTER — TELEPHONE (OUTPATIENT)
Dept: FAMILY MEDICINE | Facility: CLINIC | Age: 27
End: 2022-03-02
Payer: COMMERCIAL

## 2022-03-02 NOTE — LETTER
March 2, 2022    Micki Thorpe  [address] [address]        Dear Micki,    Great news! The lesion(s) that was removed has been found to be benign (not cancer) and is called a(n) predominantly intradermal melanocytic nevus. Thus, no further treatment is needed.  Please continue wound care as discussed until healed        Sincerely,   Flaquita Mao

## 2022-03-02 NOTE — TELEPHONE ENCOUNTER
Letter sent to pt regarding normal path results: Predominantly intradermal melanocytic nevus  Kellen ESPOSITO RN,   . [facility]   [phone]

## 2022-03-05 ENCOUNTER — E-VISIT (OUTPATIENT)
Dept: URGENT CARE | Facility: URGENT CARE | Age: 27
End: 2022-03-05
Payer: COMMERCIAL

## 2022-03-05 DIAGNOSIS — H10.30 ACUTE BACTERIAL CONJUNCTIVITIS, UNSPECIFIED LATERALITY: Primary | ICD-10-CM

## 2022-03-05 PROCEDURE — 99207 PR NO CHARGE LOS: CPT | Performed by: FAMILY MEDICINE

## 2022-03-05 RX ORDER — POLYMYXIN B SULFATE AND TRIMETHOPRIM 1; 10000 MG/ML; [USP'U]/ML
1-2 SOLUTION OPHTHALMIC EVERY 4 HOURS
Qty: 10 ML | Refills: 0 | Status: SHIPPED | OUTPATIENT
Start: 2022-03-05 | End: 2022-03-12

## 2022-03-11 NOTE — PATIENT INSTRUCTIONS
Patient Education     Benign Paroxysmal Positional Vertigo     Your health care provider may move your head in certain ways to treat your BPPV.     Benign paroxysmal positional vertigo (BPPV) is a problem with the inner ear. The inner ear contains the vestibular system. This system is what helps you keep your balance. BPPV causes a feeling of spinning. It is a common problem of the vestibular system.  Understanding the vestibular system  The vestibular system of the ear is made up of very tiny parts. They include the utricle, saccule, and semicircular canals. The utricle is a tiny organ that contains calcium crystals. In some people, the crystals can move into the semicircular canals. When this happens, the system no longer works as it should. This causes BPPV. Benign means it is not life threatening. Paroxysmal means it happens suddenly. Positional means that it happens when you move your head. Vertigo is a feeling of spinning.  What causes BPPV?  Causes include injury to your head or neck. Other problems with the vestibular system may cause BPPV. In many people, the cause of BPPV is not known.  Symptoms of BPPV  You many have repeated feelings of spinning (vertigo). The vertigo usually lasts less than 1 minute. Some movements, such as rolling over in bed, can bring on vertigo.  Diagnosing BPPV  Your primary healthcare provider may diagnose and treat your BPPV. Or you may see an ear, nose, and throat doctor (otolaryngologist). In some cases, you may see a nervous system doctor (neurologist).  The healthcare provider will ask about your symptoms and your medical history. He or she will examine you. You may have hearing and balance tests. As part of the exam, your healthcare provider may have you move your head and body in certain ways. If you have BPPV, the movements can bring on vertigo. Your provider will also look for abnormal movements of your eyes. You may have other tests to check your vestibular or  Hpi Title: Evaluation of Skin Lesions Additional History: Pt notes spot of concern on back of her head. \\n\\nEst pt, last fbse 3/29/2021 with NICHOLAS. nervous systems.  Treatment for BPPV  Your healthcare provider may try to move the calcium crystals. This is done by having you move your head and neck in certain ways. This treatment is safe and often works well. You may also be told to do these movements at home. You may still have vertigo for a few weeks. Your healthcare provider will recheck your symptoms, usually in about a month. Special physical therapy may also be part of treatment. In rare cases, surgery may be needed for BPPV that does not go away.     When to call the healthcare provider  Call your healthcare provider right away if you have any of these:    Symptoms that do not go away with treatment    Symptoms that get worse    New symptoms   Date Last Reviewed: 5/1/2017 2000-2018 [employer]. [address] [place], [place] [address]. All rights reserved. This information is not intended as a substitute for professional medical care. Always follow your healthcare professional's instructions.           Patient Education     Benign Paroxysmal Positional Vertigo     Your health care provider may move your head in certain ways to treat your BPPV.     Benign paroxysmal positional vertigo (BPPV) is a problem with the inner ear. The inner ear contains the vestibular system. This system is what helps you keep your balance. BPPV causes a feeling of spinning. It is a common problem of the vestibular system.  Understanding the vestibular system  The vestibular system of the ear is made up of very tiny parts. They include the utricle, saccule, and semicircular canals. The utricle is a tiny organ that contains calcium crystals. In some people, the crystals can move into the semicircular canals. When this happens, the system no longer works as it should. This causes BPPV. Benign means it is not life threatening. Paroxysmal means it happens suddenly. Positional means that it happens when you move your head. Vertigo is a feeling of spinning.  What  causes BPPV?  Causes include injury to your head or neck. Other problems with the vestibular system may cause BPPV. In many people, the cause of BPPV is not known.  Symptoms of BPPV  You many have repeated feelings of spinning (vertigo). The vertigo usually lasts less than 1 minute. Some movements, such as rolling over in bed, can bring on vertigo.  Diagnosing BPPV  Your primary healthcare provider may diagnose and treat your BPPV. Or you may see an ear, nose, and throat doctor (otolaryngologist). In some cases, you may see a nervous system doctor (neurologist).  The healthcare provider will ask about your symptoms and your medical history. He or she will examine you. You may have hearing and balance tests. As part of the exam, your healthcare provider may have you move your head and body in certain ways. If you have BPPV, the movements can bring on vertigo. Your provider will also look for abnormal movements of your eyes. You may have other tests to check your vestibular or nervous systems.  Treatment for BPPV  Your healthcare provider may try to move the calcium crystals. This is done by having you move your head and neck in certain ways. This treatment is safe and often works well. You may also be told to do these movements at home. You may still have vertigo for a few weeks. Your healthcare provider will recheck your symptoms, usually in about a month. Special physical therapy may also be part of treatment. In rare cases, surgery may be needed for BPPV that does not go away.     When to call the healthcare provider  Call your healthcare provider right away if you have any of these:    Symptoms that do not go away with treatment    Symptoms that get worse    New symptoms   Date Last Reviewed: 5/1/2017 2000-2018 [employer]. [address] [place] [address]. All rights reserved. This information is not intended as a substitute for professional medical care. Always follow your healthcare  professional's instructions.        Patient Education   Reach and Hold Exercise       Do this exercise on your hands and knees. Keep your knees under your hips and your hands under your shoulders. Keep your spine in a neutral position (not arched or sagging). Keep your ears in line with your shoulders. Hold for a few seconds before starting the exercise:  1. Tighten your belly muscles and raise one arm straight in front of you, palm down. Hold for 5 seconds, then lower. Repeat 5 times.  2. Do the exercise again, this time lifting your arm to the side. Repeat 5 times.  3. Do the exercise again, this time lifting your arm backward, palm up. Repeat 5 times.  Switch sides and do each exercise with the other arm.  Date Last Reviewed: 11/1/2017 2000-2018 [employer]. [address] [place] [address]. All rights reserved. This information is not intended as a substitute for professional medical care. Always follow your healthcare professional's instructions.         Patient Education   Shoulder and Upper Back Stretch  To start, stand tall with your ears, shoulders, and hips in line. Your feet should be slightly apart, positioned just under your hips. Focus your eyes directly in front of you.  this position for a few seconds before starting your exercise. This helps increase your awareness of proper posture.          Reach overhead and slightly back with both arms. Keep your shoulders and neck aligned and your elbows behind your shoulders:    With your palms facing the ceiling, turn your fingers inward.    Take a deep breath. Breathe out, and lower your elbows toward your buttocks. Hold for 5 seconds, then return to starting position.    Repeat 3 times.  Date Last Reviewed: 11/1/2017 2000-2018 [employer]. [address] [place] [address]. All rights reserved. This information is not intended as a substitute for professional medical care. Always follow your  healthcare professional's instructions.         Patient Education   Shoulder Clock Exercise    To start, stand tall with your ears, shoulders, and hips in line. Your feet should be slightly apart, positioned just under your hips. Focus your eyes directly in front of you.  this position for a few seconds before starting your exercise. This helps increase your awareness of proper posture.    Imagine that your right shoulder is the center of a clock. With the outer point of your shoulder, roll it around to slowly trace the outer edge of the clock.    Move clockwise first, then counterclockwise.    Repeat 3 to 5 times. Switch shoulders.  Date Last Reviewed: 3/1/2018    7791-1789 [employer]. [address] [place] [address]. All rights reserved. This information is not intended as a substitute for professional medical care. Always follow your healthcare professional's instructions.         Patient Education   Shoulder Exercises    To start, sit in a chair with your feet flat on the floor. Your weight should be slightly forward so that you re balanced evenly on your buttocks. Relax your shoulders and keep your head level. Avoid arching your back or rounding your shoulders. Using a chair with arms may help you keep your balance.    Raise your arms, elbows bent, to shoulder height.    Slowly move your forearms together. Hold for 5 seconds.    Return to starting position. Repeat 5 times.  Date Last Reviewed: 3/1/2018    3273-2568 [employer]. [address] [place] [address]. All rights reserved. This information is not intended as a substitute for professional medical care. Always follow your healthcare professional's instructions.         Patient Education   Shoulder Shrug Exercise    To start, sit in a chair with your feet flat on the floor. Shift your weight slightly forward to avoid rounding your back. Relax. Keep your ears, shoulders, and hips aligned:    Raise  both of your shoulders as high as you can, as if you were trying to touch them to your ears. Keep your head and neck still and relaxed.    Hold for a count of 10. Release.    Repeat 5 times.  For your safety, check with your healthcare provider before starting an exercise program.   Date Last Reviewed: 11/1/2017 2000-2018 [employer]. [address] [place] [address]. All rights reserved. This information is not intended as a substitute for professional medical care. Always follow your healthcare professional's instructions.         Patient Education   Shoulder Squeeze Exercise    To start, sit in a chair with your feet flat on the floor. Shift your weight slightly forward to avoid rounding your back. Relax. Keep your ears, shoulders, and hips aligned:    Raise your arms to shoulder height, elbows bent and palms forward.    Move your arms back, squeezing your shoulder blades together.    Hold for 10 seconds. Return to starting position.     Repeat 5 times.   For your safety, check with your healthcare provider before starting an exercise program.   Date Last Reviewed: 11/1/2017 2000-2018 [employer]. [address] [place] [address]. All rights reserved. This information is not intended as a substitute for professional medical care. Always follow your healthcare professional's instructions.         Patient Education   Neck Exercises: Active Neck Rotation    To start, lie on your back, knees bent and feet flat on the floor. Keep your ears, shoulders, and hips aligned, but don t press your lower back to the floor. Rest your hands on your pelvis. Breathe deeply and relax.  Here are the steps for the active neck rotation:    Use your neck muscles to turn your head to one side until you feel a stretch in the muscles.    Hold for 5 seconds. Then turn to the other side.    Repeat 5 times on each side.  Note: Keep your shoulders on the floor. Don t lift or tuck your chin  as you turn your head.  Date Last Reviewed: 11/1/2017 2000-2018 [employer]. [address] [place] [address]. All rights reserved. This information is not intended as a substitute for professional medical care. Always follow your healthcare professional's instructions.         Patient Education   Neck Exercises: Arm Lift            To start, lie on your back, knees bent and feet flat on the floor. Keep your ears, shoulders, and hips aligned, but don t press your lower back to the floor. Rest your hands on your pelvis. Breathe deeply and relax. Tighten the belly muscles to keep the back from arching.  Here are the steps for the arm lift:    Raise one arm overhead, then lower it. As you lower that arm, raise the other arm.    Continue to move both arms in slow, smooth arcs. Keep your arms straight and your head and neck relaxed.    Repeat 10 times with each arm.  For your safety, check with your healthcare provider before starting an exercise program.   Date Last Reviewed: 11/1/2017 2000-2018 [employer]. [address] [place] [address]. All rights reserved. This information is not intended as a substitute for professional medical care. Always follow your healthcare professional's instructions.         Patient Education   Neck Exercises: Head Lifts    Do this exercise on your hands and knees. Keep your knees under your hips and your hands under your shoulders. Keep your spine in a neutral position (not arched or sagging). Keep your ears in line with your shoulders. Hold for a few seconds before starting the exercise:    Keeping your back straight, slowly drop your chin toward your chest. Tuck in your chin.    Hold for 5 seconds. Then lift your head until your neck is level with your back.    Hold for 5 seconds. Repeat 5 to10 times.  Date Last Reviewed: 3/1/2018    4450-7440 [employer]. [address] [place] [address]. All rights reserved.  This information is not intended as a substitute for professional medical care. Always follow your healthcare professional's instructions.         Patient Education   Neck Exercises: Neck and Torso Rotation   To start, lie on your back, knees bent and feet flat on the floor. Keep your ears, shoulders, and hips aligned, but don t press your lower back to the floor. Breathe deeply and relax.    From starting position, drop both knees to one side. At the same time, turn your head and look in the other direction.    Keep both feet in contact with the floor and keep your arms at your sides.    Hold for 5 seconds. Then slowly switch sides.    Repeat 5 to 10 times.  Date Last Reviewed: 3/1/2018    4837-1493 [employer]. [address] [place] [address]. All rights reserved. This information is not intended as a substitute for professional medical care. Always follow your healthcare professional's instructions.         Patient Education   Neck Exercises: Neck Flex  To start, sit in a chair with your feet flat on the floor. Your weight should be slightly forward so that you re balanced evenly on your buttocks. Relax your shoulders and keep your head level. Avoid arching your back or rounding your shoulders. Using a chair with arms may help you keep your balance:    Rest the back of your left hand against your lower back. Place your right palm on the top of your head.    Gently pull your head forward and down until you feel a stretch in the muscles in the back of your neck. Don t force the motion.    Hold for 20 seconds, then return to starting position. Switch arms.    Repeat 5 to 10 times.    Date Last Reviewed: 3/1/2018    6062-8339 [employer]. [address] [place] [address]. All rights reserved. This information is not intended as a substitute for professional medical care. Always follow your healthcare professional's instructions.         Patient Education   Neck  Exercises: Passive Neck Rotation    To start, lie on your back, knees bent and feet flat on the floor. Keep your ears, shoulders, and hips aligned, but don t press your lower back to the floor. Rest your hands on your pelvis. Breathe deeply and relax.  Here are the steps for passive neck rotation:     With your neck relaxed, place the palm of one hand on your forehead. Use your hand to turn your head to one side until you feel a stretch in the neck muscles. Do not push through pain.    Hold for 5 seconds. Then turn to the other side.    Repeat 5 times on each side.   Note: Keep your shoulders on the floor. Don t lift your chin as you turn your head.  Date Last Reviewed: 11/1/2017 2000-2018 [employer]. [address] [place] [address]. All rights reserved. This information is not intended as a substitute for professional medical care. Always follow your healthcare professional's instructions.         Patient Education   Shoulder External Rotation, Isometric (Strength)    1. Bend your right arm in front of your body, palm up. Hold your right wrist with your left hand.  2. Try to push your right arm outward, while pulling back with your left arm. Try not to let either arm move. Push and pull both arms firmly in opposite directions.  3. Hold for 5 seconds. Then relax.  4. Repeat 5 times.  5. Repeat this exercise 3 times a day, or as instructed.  Date Last Reviewed: 3/10/2016    1449-7822 [employer]. [address] [place] [address]. All rights reserved. This information is not intended as a substitute for professional medical care. Always follow your healthcare professional's instructions.           Thank you for choosing [facility]  for your Health Care. It was a pleasure seeing you at your visit today. Please contact us with any questions or concerns you may have.                   Ashwini Garcia MD                            To  reach your [facility] team after hours call:   [phone]    Our clinic hours are:     Monday- 7:30 am - 7:00 pm                             Tuesday through Friday- 7:30 am - 5:00 pm                                        Saturday- 8:00 am - 12:00 pm                  Phone:  [phone]    Our pharmacy hours are:     Monday  8:00 am to 7:00 pm      Tuesday through Friday 8:00am to 6:00pm                        Saturday - 9:00 am to 1:00 pm      Sunday : Closed.              Phone:  [phone]      There is also information available at our web site:  [email or web address]    If your provider ordered any lab tests and you do not receive the results within 10 business days, please call the clinic.    If you need a medication refill please contact your pharmacy.  Please allow 2 business days for your refill to be completed.    Our clinic offers telephone visits and e visits.  Please ask one of your team members to explain more.      Use [employer] (secure email communication and access to your chart) to send your primary care provider a message or make an appointment. Ask someone on your Team how to sign up for [employer].

## 2022-09-22 ENCOUNTER — MYC MEDICAL ADVICE (OUTPATIENT)
Dept: FAMILY MEDICINE | Facility: CLINIC | Age: 27
End: 2022-09-22

## 2022-10-09 ENCOUNTER — HEALTH MAINTENANCE LETTER (OUTPATIENT)
Age: 27
End: 2022-10-09

## 2023-03-08 ENCOUNTER — E-VISIT (OUTPATIENT)
Dept: URGENT CARE | Facility: CLINIC | Age: 28
End: 2023-03-08
Payer: COMMERCIAL

## 2023-03-08 ENCOUNTER — LAB (OUTPATIENT)
Dept: LAB | Facility: CLINIC | Age: 28
End: 2023-03-08
Payer: COMMERCIAL

## 2023-03-08 DIAGNOSIS — N89.8 VAGINAL DISCHARGE: ICD-10-CM

## 2023-03-08 DIAGNOSIS — N89.8 VAGINAL DISCHARGE: Primary | ICD-10-CM

## 2023-03-08 DIAGNOSIS — N30.00 ACUTE CYSTITIS WITHOUT HEMATURIA: Primary | ICD-10-CM

## 2023-03-08 LAB
ALBUMIN UR-MCNC: NEGATIVE MG/DL
APPEARANCE UR: ABNORMAL
BACTERIA #/AREA URNS HPF: ABNORMAL /HPF
BILIRUB UR QL STRIP: NEGATIVE
CLUE CELLS: ABNORMAL
COLOR UR AUTO: YELLOW
GLUCOSE UR STRIP-MCNC: NEGATIVE MG/DL
HCG UR QL: NEGATIVE
HGB UR QL STRIP: NEGATIVE
KETONES UR STRIP-MCNC: NEGATIVE MG/DL
LEUKOCYTE ESTERASE UR QL STRIP: ABNORMAL
MUCOUS THREADS #/AREA URNS LPF: PRESENT /LPF
NITRATE UR QL: NEGATIVE
PH UR STRIP: 5.5 [PH] (ref 5–7)
RBC #/AREA URNS AUTO: ABNORMAL /HPF
SP GR UR STRIP: >=1.03 (ref 1–1.03)
SQUAMOUS #/AREA URNS AUTO: ABNORMAL /LPF
TRICHOMONAS, WET PREP: ABNORMAL
UROBILINOGEN UR STRIP-ACNC: 0.2 E.U./DL
WBC #/AREA URNS AUTO: ABNORMAL /HPF
WBC CLUMPS #/AREA URNS HPF: PRESENT /HPF
WBC'S/HIGH POWER FIELD, WET PREP: ABNORMAL
YEAST, WET PREP: ABNORMAL

## 2023-03-08 PROCEDURE — 87591 N.GONORRHOEAE DNA AMP PROB: CPT

## 2023-03-08 PROCEDURE — 87491 CHLMYD TRACH DNA AMP PROBE: CPT

## 2023-03-08 PROCEDURE — 87210 SMEAR WET MOUNT SALINE/INK: CPT | Performed by: PHYSICIAN ASSISTANT

## 2023-03-08 PROCEDURE — 99421 OL DIG E/M SVC 5-10 MIN: CPT | Performed by: PHYSICIAN ASSISTANT

## 2023-03-08 PROCEDURE — 81025 URINE PREGNANCY TEST: CPT

## 2023-03-08 PROCEDURE — 87086 URINE CULTURE/COLONY COUNT: CPT

## 2023-03-08 PROCEDURE — 81001 URINALYSIS AUTO W/SCOPE: CPT

## 2023-03-08 RX ORDER — NITROFURANTOIN 25; 75 MG/1; MG/1
100 CAPSULE ORAL 2 TIMES DAILY
Qty: 10 CAPSULE | Refills: 0 | Status: SHIPPED | OUTPATIENT
Start: 2023-03-08 | End: 2023-03-13

## 2023-03-08 NOTE — PATIENT INSTRUCTIONS
Thank you for choosing us for your care. Given your symptoms, I would like you to do a lab-only visit to determine what is causing them.  I have placed the orders.  Please schedule an appointment with the lab right here in [facility], or call [phone].  I will let you know when the results are back and next steps to take.

## 2023-03-09 LAB
C TRACH DNA SPEC QL NAA+PROBE: NEGATIVE
N GONORRHOEA DNA SPEC QL NAA+PROBE: NEGATIVE

## 2023-03-11 LAB — BACTERIA UR CULT: NORMAL

## 2023-03-25 ENCOUNTER — HEALTH MAINTENANCE LETTER (OUTPATIENT)
Age: 28
End: 2023-03-25

## 2023-10-09 ENCOUNTER — OFFICE VISIT (OUTPATIENT)
Dept: FAMILY MEDICINE | Facility: CLINIC | Age: 28
End: 2023-10-09
Payer: COMMERCIAL

## 2023-10-09 VITALS
DIASTOLIC BLOOD PRESSURE: 88 MMHG | HEART RATE: 80 BPM | SYSTOLIC BLOOD PRESSURE: 122 MMHG | WEIGHT: 210 LBS | OXYGEN SATURATION: 99 % | HEIGHT: 66 IN | BODY MASS INDEX: 33.75 KG/M2 | TEMPERATURE: 97.1 F | RESPIRATION RATE: 16 BRPM

## 2023-10-09 DIAGNOSIS — Z23 NEEDS FLU SHOT: ICD-10-CM

## 2023-10-09 DIAGNOSIS — R63.5 WEIGHT GAIN: ICD-10-CM

## 2023-10-09 DIAGNOSIS — N89.8 VAGINAL DISCHARGE: Primary | ICD-10-CM

## 2023-10-09 DIAGNOSIS — E04.9 THYROID ENLARGEMENT: ICD-10-CM

## 2023-10-09 DIAGNOSIS — Z59.86 FINANCIAL INSECURITY: ICD-10-CM

## 2023-10-09 DIAGNOSIS — Z12.4 CERVICAL CANCER SCREENING: ICD-10-CM

## 2023-10-09 DIAGNOSIS — Z59.41 FOOD INSECURITY: ICD-10-CM

## 2023-10-09 DIAGNOSIS — Z23 NEED FOR COVID-19 VACCINE: ICD-10-CM

## 2023-10-09 DIAGNOSIS — Z13.9 ENCOUNTER FOR SCREENING INVOLVING SOCIAL DETERMINANTS OF HEALTH (SDOH): ICD-10-CM

## 2023-10-09 LAB
CLUE CELLS: ABNORMAL
TRICHOMONAS, WET PREP: ABNORMAL
WBC'S/HIGH POWER FIELD, WET PREP: ABNORMAL
YEAST, WET PREP: ABNORMAL

## 2023-10-09 PROCEDURE — 99215 OFFICE O/P EST HI 40 MIN: CPT | Mod: 25 | Performed by: FAMILY MEDICINE

## 2023-10-09 PROCEDURE — 36415 COLL VENOUS BLD VENIPUNCTURE: CPT | Performed by: FAMILY MEDICINE

## 2023-10-09 PROCEDURE — 87491 CHLMYD TRACH DNA AMP PROBE: CPT | Performed by: FAMILY MEDICINE

## 2023-10-09 PROCEDURE — 90480 ADMN SARSCOV2 VAC 1/ONLY CMP: CPT | Performed by: FAMILY MEDICINE

## 2023-10-09 PROCEDURE — 87210 SMEAR WET MOUNT SALINE/INK: CPT | Performed by: FAMILY MEDICINE

## 2023-10-09 PROCEDURE — 87591 N.GONORRHOEAE DNA AMP PROB: CPT | Performed by: FAMILY MEDICINE

## 2023-10-09 PROCEDURE — 91320 SARSCV2 VAC 30MCG TRS-SUC IM: CPT | Performed by: FAMILY MEDICINE

## 2023-10-09 PROCEDURE — 84443 ASSAY THYROID STIM HORMONE: CPT | Performed by: FAMILY MEDICINE

## 2023-10-09 PROCEDURE — 84480 ASSAY TRIIODOTHYRONINE (T3): CPT | Performed by: FAMILY MEDICINE

## 2023-10-09 PROCEDURE — 90471 IMMUNIZATION ADMIN: CPT | Performed by: FAMILY MEDICINE

## 2023-10-09 PROCEDURE — 83520 IMMUNOASSAY QUANT NOS NONAB: CPT | Mod: 90 | Performed by: FAMILY MEDICINE

## 2023-10-09 PROCEDURE — 90686 IIV4 VACC NO PRSV 0.5 ML IM: CPT | Performed by: FAMILY MEDICINE

## 2023-10-09 PROCEDURE — 99000 SPECIMEN HANDLING OFFICE-LAB: CPT | Performed by: FAMILY MEDICINE

## 2023-10-09 PROCEDURE — 86376 MICROSOMAL ANTIBODY EACH: CPT | Performed by: FAMILY MEDICINE

## 2023-10-09 PROCEDURE — 84439 ASSAY OF FREE THYROXINE: CPT | Performed by: FAMILY MEDICINE

## 2023-10-09 PROCEDURE — G0145 SCR C/V CYTO,THINLAYER,RESCR: HCPCS | Performed by: FAMILY MEDICINE

## 2023-10-09 SDOH — ECONOMIC STABILITY - FOOD INSECURITY: FOOD INSECURITY: Z59.41

## 2023-10-09 SDOH — ECONOMIC STABILITY - INCOME SECURITY: FINANCIAL INSECURITY: Z59.86

## 2023-10-09 NOTE — COMMUNITY RESOURCES LIST (ENGLISH)
10/09/2023   [facility]  N/A  For questions about this resource list or additional care needs, please contact your primary care clinic or care manager.  Phone: [phone]   Email: N/A   Address: [address] [place] [address]   Hours: N/A        Financial Stability       Utility payment assistance  1  [facility] Distance: 1.47 miles      In-Person, Phone/Virtual   [address] [place] [address]  Language: English, Jordanian  Hours: Mon - Thu 9:00 AM - 4:00 PM  Fees: Free   Phone: (729) 318-4347 Email: info@[employer] Website: https://[employer]/     2  [facility] Distance: 1.79 miles      In-Person, Phone/Virtual   [address] [place] [address]  Language: English  Hours: Mon 4:00 PM - 6:00 PM , Tue 11:00 AM - 1:00 PM , Wed 4:00 PM - 6:00 PM , Thu 10:30 AM - 12:30 PM  Fees: Free   Phone: (109) 257-7517 Email: resources@[employer].org Website: https://[facility].org/[place]/mission-outpost/          Food and Nutrition       Food pantry  3  [facility] Distance: 0.35 miles      In-Person   [address] [place] [address]  Language: English, Cuban  Hours: Mon - Fri 9:00 AM - 5:00 PM  Fees: Free   Phone: (784) 872-3105 Email: arielle@[employer] Website: https://www.artandseek.org/[place]-office/     4  [facility] Distance: 1.47 miles      Pickup   [address] [facility] [address] [place] [address]  Language: English, Jordanian  Hours: Mon - Thu 9:00 AM - 3:00 PM  Fees: Free   Phone: (502) 682-5421 Email: info@[employer].org Website: https://[employer].org/     SNAP application assistance  5  [facility] Distance: 7.99 miles      In-Person   [address] [place] [address]  Language: English  Hours: Mon 8:00 AM - 4:00 PM , Tue 8:00 AM - 7:00 PM  , Wed - Thu 8:00 AM - 4:00 PM  Fees: Free   Phone: (224) 534-6759 Email: info@[employer] Website: https://[employer]/resources/resource-centers/     6  [employer] [place] Distance: 9.25 miles      In-Person   [address] [place] [address]  Language: English, Togolese  Hours: Mon - Fri 8:00 AM - 8:00 PM  Fees: Free   Phone: (291) 540-4322 Email: info@[employer].org Website: http://www.[employer].org     Soup kitchen or free meals  7  [facility] Distance: 5.17 miles      Pickup   [address] [place] [address]  Language: English  Hours: Mon - Fri 5:00 PM - 6:00 PM  Fees: Free   Phone: (838) 813-9671 Email: contactus@[employer].org Website: https://www.[employer].org/     8  [facility] [place] [place] Distance: 6.45 miles      Pickup   [address] [place] [address]  Language: English, Togolese  Hours: Mon - Thu 5:30 PM - 6:30 PM  Fees: Free   Phone: (245) 149-9642 Email: vicente@[employer].org Website: http://[employer]/wordMissionly/?page_id=5168          Important Numbers & Websites       Emergency Services   911  [facility]   311  Poison Control   (402) 263-9190  Suicide Prevention Lifeline   (143) 470-1019 (TALK)  Child Abuse Hotline   (117) 216-3992 (4-A-Child)  Sexual Assault Hotline   (441) 204-7460 (HOPE)  National Runaway Safeline   (884) 175-6655 (RUNAWAY)  All-Options Talkline   (438) 293-5860  Substance Abuse Referral   (969) 116-9427 (HELP)

## 2023-10-09 NOTE — PATIENT INSTRUCTIONS
" [facility]  [address] [place] [address]  Office: [phone]   Fax:    [phone]     Please, call our clinic or go to the ER immediately if signs or symptoms worsen or fail to improve as anticipated.     Thank you so much or choosing [facility]  for your Health Care. It was a pleasure seeing you at your visit today! Please contact us with any questions or concerns you may have.                   Ashwini Garcia MD                              To reach your [facility] care team after hours call:   [phone] press #2 \"to speak with your care team\".  This will get you to our clinic instead of routing to central [facility]  scheduling.     PLEASE NOTE OUR HOURS HAVE CHANGED secondary to COVID-19 coronavirus pandemic, as we are trying to minimize patient exposure to the virus,  which is now widespread in the [facility].  These hours may change with very little notice.  We apologize for any inconvenience.       Our current clinic hours are:          Monday- Thursday   7:00am - 6:00pm  in person.      Friday  7:00am- 5:00pm                       Saturday and Sunday : Closed to in person and virtual visits        We have telephone and virtual visit times available between    7:00am - 6pm on Monday-Friday as well.                                                Phone:  [phone]      Our pharmacy hours: Monday through Friday 8:00am to 5:00pm                        Saturday - 9:00 am to 12 noon       Sunday : Closed.              Phone:  [phone]              ###  Please note: at this time we are not accepting any walk-in visits. ###      There is also information available at our web site:  [email or web address]    If your provider ordered any lab tests and you do not receive the results within 10 business days, please call the clinic.    If you need a medication refill please contact your pharmacy.  Please allow 3 " business days for your refill to be completed.    Our clinic offers telephone visits and e visits.  Please ask one of your team members to explain more.      Use Drybarhart (secure email communication and access to your chart) to send your primary care provider a message or make an appointment. Ask someone on your Team how to sign up for Drybarhart.              Future Appointments 10/9/2023 - 4/6/2024        Date Visit Type Length Department Provider     1/25/2024  2:00 PM PREVENTATIVE ADULT 40 min RV FAMILY PRACTICE Ashwini Garcia MD    Location Instructions:     [facility] is located at [address] [place] [address], along [address]. Free parking is available; access the lot by turning north from [address] onto [address], then west onto [facility].

## 2023-10-09 NOTE — COMMUNITY RESOURCES LIST (ENGLISH)
10/09/2023   [facility] - Outpatient Clinics  N/A  For additional resource needs, please contact your health insurance member services or your primary care team.  Phone: [phone]   Email: N/A   Address: [address] [facility] [place] [address]   Hours: N/A        Financial Stability       Utility payment assistance  1  [place] [facility] - Energy and Utilities Distance: 15.77 miles      In-Person, Phone/Virtual   [address] [place] [address]  Language: English  Hours: Mon - Fri 8:30 AM - 4:30 PM  Fees: Free   Phone: (154) 525-1383 Website: https://[place]/riskmethods/energy/consumer-assistance/energy-assistance-program/     2  [place] m[employer]Care [employer] Commission - [place]'s Telephone Assistance Plan (TAP) and Federal Lifeline and Affordable Connectivity Program (ACP) Distance: 21.43 miles      Phone/Virtual   [address] [place] [address]  Language: English  Fees: Free   Phone: (592) 451-5606 Email: consumer.juan pablo@Replaced by [facility].mn. Website: https://[place].gov/puc/consumers/telephone/          Food and Nutrition       Food pantry  3  [facility] Food Shelf Distance: 0.35 miles      In-Person   [address] [place] [address]  Language: English, Peruvian  Hours: Mon - Fri 9:00 AM - 5:00 PM  Fees: Free   Phone: (897) 893-1274 Email: arielle@[employer].Treasure Data Website: https://www.[employer].org/Revloc-office/     4  [address] [place] [address] Distance: 1.47 miles      [facility]   [address] [place] [address]  Language: English, Emirati  Hours: Mon - Thu 9:00 AM - 3:00 PM  Fees: Free   Phone: (798) 655-2417 Email: info@Apparent.Treasure Data Website: https://FootballScout.org/     SNAP application assistance  5  [employer] [place] Distance: 15.93 miles      Phone/Virtual   [address] [place] [address]  Language: English, Hmong, Macedonian, Peruvian, Emirati  Hours: Mon - Fri 8:30 AM - 4:30 PM  Fees:  Free   Phone: (194) 254-9446 Email: helpline@[facility] Hospital[facility].org Website: https://www.Adomos[employer].org/programs/[place]-food-helpline/     6  [address] [place] [address] Distance: 7.99 miles      In-Person   [address] [place] [address]  Language: English  Hours: Mon 8:00 AM - 4:00 PM , Tue 8:00 AM - 7:00 PM , Wed - Thu 8:00 AM - 4:00 PM  Fees: Free   Phone: (847) 939-6829 Email: info@[address] [place], [place] [address] Website: https://[facility] CenterAngioScore/resources/resource-centers/     Soup kitchen or free meals  7  Ten the [facility] - Loaves and Fishes Distance: 5.17 miles      Pickup   [address] [place] [address]  Language: English  Hours: Mon - Fri 5:00 PM - 6:00 PM  Fees: Free   Phone: (437) 993-1799 Email: contactus@[employer] Website: https://www.Kuliza/     8  [employer]ente by the [facility] - Loaves and Fishes Distance: 6.45 miles      Pickup   [address] [place] [address]  Language: English, Iranian  Hours: Mon - Thu 5:30 PM - 6:30 PM  Fees: Free   Phone: (439) 481-9611 Email: vicente@[employer].OneTwoSee Website: http://BackupAgent.OneTwoSee/wordMonstrous/?page_id=5168          Important Numbers & Websites       [facility]   211 211itedway.org  Poison Control   (597) 184-3859 [place]poison.[place]  Suicide and Crisis Lifeline   985 988lifeline.org  Childhelp National Child Abuse Hotline   824.575.4198 Childhelphotline.org  National Sexual Assault Hotline   (742) 862-4314 (HOPE) Rainn.org  National Runaway Safeline   (237) 940-8002 (RUNAWAY) [facility] Hospital[facility]away.org  Pregnancy & Postpartum Support [place]   Call/text 776-234-8601 Ppsupportmn.org  Substance Abuse National Helpline ([facility]   101-418-HELP (4137) Findtreatment.gov  Emergency Services   911

## 2023-10-09 NOTE — PROGRESS NOTES
"  Assessment & Plan :       ICD-10-CM    1. Vaginal discharge  N89.8 Chlamydia trachomatis/Neisseria gonorrhoeae by PCR - Clinic Collect     Wet prep - Clinic Collect      2. Cervical cancer screening  Z12.4 Pap Screen reflex to HPV if ASCUS - recommend age 25 - 29      3. Encounter for screening involving social determinants of health (SDoH)  Z13.9 Primary Care - Care Coordination Referral     REVIEW OF HEALTH MAINTENANCE PROTOCOL ORDERS      4. Thyroid enlargement  E04.9 US Thyroid     TSH     T3 total     T4 free     Thyroid peroxidase antibody     Thyrotropin Receptor Antibody     TSH     T3 total     T4 free     Thyroid peroxidase antibody     Thyrotropin Receptor Antibody      5. Weight gain  R63.5 TSH     T3 total     T4 free     TSH     T3 total     T4 free      6. Food insecurity- expressed by pt 10/9/2023 - referred to Care Coordination  Z59.41 Primary Care - Care Coordination Referral      7. Financial insecurityy- expressed by pt 10/9/2023 - referred to Care Coordination  Z59.86 Primary Care - Care Coordination Referral      8. Needs flu shot  Z23 INFLUENZA VACCINE IM > 6 MONTHS VALENT IIV4 (AFLURIA/FLUZONE)      9. Need for COVID-19 vaccine  Z23 COVID-19 12+ (2023-24) (PFIZER)        Food and financial insecurity expressed - will have Care coordination contact pt - ok'd with her.     Please, call our clinic or go to the ER immediately if signs or symptoms worsen or fail to improve as anticipated.     Ordering of each unique test  Prescription drug management  45 minutes spent by me on the date of the encounter doing chart review, history and exam, documentation and further activities per the note       BMI:   Estimated body mass index is 33.89 kg/m  as calculated from the following:    Height as of this encounter: 1.676 m (5' 6\").    Weight as of this encounter: 95.3 kg (210 lb).   Weight management plan: Discussed healthy diet and exercise guidelines    MEDICATIONS:   No orders of the defined types " were placed in this encounter.        - Continue other medications without change  Work on weight loss  Regular exercise  See Patient Instructions    Ashwini Garcia MD  [facility] [place]    Payal Sweet is a 28 year old, presenting for the following health issues:  Vaginal Problem        10/9/2023     1:53 PM   Additional Questions   Roomed by marcelle reich       History of Present Illness       Reason for visit:  Hoping for a papsmear, and also hoping to get thyroid checked  Symptom onset:  3-4 weeks ago  Symptoms include:  Abnormal amount of vagunal discharge-chite/chunky  Symptom intensity:  Moderate  Symptom progression:  Staying the same  Had these symptoms before:  No    She eats 2-3 servings of fruits and vegetables daily.She consumes 0 sweetened beverage(s) daily.She exercises with enough effort to increase her heart rate 60 or more minutes per day.  She exercises with enough effort to increase her heart rate 4 days per week.   She is taking medications regularly.         Vaginal Symptoms:   Onset/Duration: 2 months after being treated for a UTI   Description:  Vaginal Discharge: white curd-like   Itching (Pruritis): No  Burning sensation:  No  Odor: YES  Accompanying Signs & Symptoms:  Urinary symptoms: No  Abdominal pain: No  Fever: No  History:   Sexually active: YES-    New Partner: No  Possibility of Pregnancy:  No  Recent antibiotic use: No  Previous vaginitis issues: No  Precipitating or alleviating factors: None  Therapies tried and outcome: Monistat One day treatment , not helpful    Also having some weight gain issues and difficulty losing.     Patient Active Problem List   Diagnosis    Blood type AB+ - no need for rhogam    Encounter for supervision of other normal pregnancy, third trimester    CARDIOVASCULAR SCREENING; LDL GOAL LESS THAN 160    Unplanned wanted pregnancy - x 2- both on ocp's - 2nd on micronor     Cervical high risk HPV (human papillomavirus) test  "positive    Anxiety    Irritability and anger    Dysplastic nevus of trunk - Compound dysplastic nevus with moderate to severe atypia s/p MOHS procedure - Dr. Clay     Benign paroxysmal positional vertigo of right ear    Strain of neck muscle, initial encounter    Indication for care in labor or delivery    Encounter for elective induction of labor    Elevated blood pressure reading without diagnosis of hypertension    Eczema, unspecified type- bilateral antecubital areas, right lateral abdomen and left upper chest     Fear of flying    Needs flu shot       Current Outpatient Medications   Medication Sig Dispense Refill    citalopram (CELEXA) 20 MG tablet TAKE 1 TABLET BY MOUTH EVERY DAY 90 tablet 1    LORazepam (ATIVAN) 0.5 MG tablet Take 1 tablet (0.5 mg) by mouth as needed for anxiety (flight anxiety) 30 MINUTES PRIOR TO FLIGHT - NO ALCOHOL OR OTHER SEDATING MEDS WITHIN 8 HOURS OF THIS 8 tablet 0    triamcinolone (ARISTOCORT HP) 0.5 % external cream Apply topically 2 times daily For up to 14 days at a time 45 g 2        No Known Allergies    Review of Systems :   Constitutional, HEENT, cardiovascular, pulmonary, GI, , musculoskeletal, neuro, skin, endocrine and psych systems are negative, except as otherwise noted.      Objective    /88   Pulse 80   Temp 97.1  F (36.2  C)   Resp 16   Ht 1.676 m (5' 6\")   Wt 95.3 kg (210 lb)   LMP 10/01/2023   SpO2 99%   BMI 33.89 kg/m    Body mass index is 33.89 kg/m .  Physical Exam   GENERAL: healthy, alert and no distress  EYES: Eyes grossly normal to inspection, PERRL and conjunctivae and sclerae normal  HENT: ear canals and TM's normal, nose and mouth without ulcers or lesions  NECK: no adenopathy, no asymmetry, masses, or scars, and thyromegaly approximately 1.5  times normal- no overt nodularity to palpation.   RESP: lungs clear to auscultation - no rales, rhonchi or wheezes  CV: regular rate and rhythm, normal S1 S2, no S3 or S4, no murmur, click or " rub, no peripheral edema and peripheral pulses strong  ABDOMEN: soft, nontender, no hepatosplenomegaly, no masses and bowel sounds normal  PELVIC: Vagina and vulva are normal;  there is a moderate amount of yellowish green creamy discharge noted one the cervix and posterior vaginal vault.  Cervix normal without lesions. Uterus anteverted and mobile, normal in size and shape without tenderness.  Adnexa normal in size without masses or tenderness. Pap Smear - is due and done today.   MS: no gross musculoskeletal defects noted, no edema  SKIN: no suspicious lesions or rashes  NEURO: Normal strength and tone, mentation intact and speech normal  PSYCH: mentation appears normal, affect normal/bright    Results for orders placed or performed in visit on 10/09/23 (from the past 24 hour(s))   Wet prep - Clinic Collect    Specimen: Vagina; Swab   Result Value Ref Range    Trichomonas Absent Absent    Yeast Absent Absent    Clue Cells Absent Absent    WBCs/high power field 2+ (A) None     Sent to pt in mychart result note.

## 2023-10-10 ENCOUNTER — PATIENT OUTREACH (OUTPATIENT)
Dept: CARE COORDINATION | Facility: CLINIC | Age: 28
End: 2023-10-10
Payer: COMMERCIAL

## 2023-10-10 LAB
C TRACH DNA SPEC QL PROBE+SIG AMP: NEGATIVE
N GONORRHOEA DNA SPEC QL NAA+PROBE: NEGATIVE
T3 SERPL-MCNC: 128 NG/DL (ref 85–202)
T4 FREE SERPL-MCNC: 1.24 NG/DL (ref 0.9–1.7)
THYROPEROXIDASE AB SERPL-ACNC: <10 IU/ML
TSH SERPL DL<=0.005 MIU/L-ACNC: 2.71 UIU/ML (ref 0.3–4.2)

## 2023-10-10 NOTE — PROGRESS NOTES
Clinic Care Coordination Contact  [facility]/Voicemail       Clinical Data: Care Coordinator Outreach  Outreach attempted x 1.  Left message on patient's voicemail with call back information and requested return call.    Plan: Care Coordinator will try to reach patient again in 1-2 business days.      Kiley MUÑIZ [facility]  Community Health Worker  [facility] Clinics:  [facility] Care Coordination  [phone]

## 2023-10-10 NOTE — LETTER
[facility] CARE COORDINATION  [facility]  October 11, 2023    Micki Thorpe  [address] [address]      Dear Micki,        I am a  clinic community health worker who works with Ashwini Garcia MD with the [facility]. I wanted to introduce myself and provide you with my contact information for you to be able to call me with any questions or concerns. Below is a description of clinic care coordination and how I can further assist you.       The clinic care coordination team is made up of a registered nurse, , financial resource worker and community health worker who understand the health care system. The goal of clinic care coordination is to help you manage your health and improve access to the health care system. Our team works alongside your provider to assist you in determining your health and social needs. We can help you obtain health care and community resources, providing you with necessary information and education. We can work with you through any barriers and develop a care plan that helps coordinate and strengthen the communication between you and your care team.  Our services are voluntary and are offered without charge to you personally.    Please feel free to contact me with any questions or concerns regarding care coordination and what we can offer.      We are focused on providing you with the highest-quality healthcare experience possible.    Sincerely,       Kiley DUPONT. Public Health  Community Health Worker  [facility]:  [place] [facility] [employer]   Clinic Care Coordination  [phone]

## 2023-10-11 LAB — TSH RECEP AB SER-ACNC: <1.1 IU/L (ref 0–1.75)

## 2023-10-11 NOTE — PROGRESS NOTES
Clinic Care Coordination Contact  [facility]/Voicemail       Clinical Data: Care Coordinator Outreach  Outreach attempted x 2.  Left message on patient's voicemail with call back information and requested return call.    Plan: Care Coordinator will send care coordination introduction letter with care coordinator contact information and explanation of care coordination services via Dydrahart. Care Coordinator will do no further outreaches at this time.      Kiley MUÑIZ [facility]  Community Health Worker  [facility] Clinics:  [place] [facility] Care Coordination  [phone]

## 2023-10-12 LAB
BKR LAB AP GYN ADEQUACY: NORMAL
BKR LAB AP GYN INTERPRETATION: NORMAL
BKR LAB AP HPV REFLEX: NORMAL
BKR LAB AP LMP: NORMAL
BKR LAB AP PREVIOUS ABNORMAL: NORMAL
PATH REPORT.COMMENTS IMP SPEC: NORMAL
PATH REPORT.COMMENTS IMP SPEC: NORMAL
PATH REPORT.RELEVANT HX SPEC: NORMAL

## 2023-10-23 ENCOUNTER — HOSPITAL ENCOUNTER (OUTPATIENT)
Dept: ULTRASOUND IMAGING | Facility: CLINIC | Age: 28
Discharge: HOME | End: 2023-10-23
Attending: FAMILY MEDICINE | Admitting: FAMILY MEDICINE
Payer: COMMERCIAL

## 2023-10-23 DIAGNOSIS — E04.9 THYROID ENLARGEMENT: ICD-10-CM

## 2023-10-23 PROCEDURE — 76536 US EXAM OF HEAD AND NECK: CPT

## 2023-10-30 NOTE — RESULT ENCOUNTER NOTE
Dear Micki,     Thank you for your patience in getting my comments on your recent results to you.     All your recent labs are normal, improved, or pretty stable from previous.     Please, continue your current medications and/or supplements and follow up as we discussed at your last visit.     For additional lab test information, [email or web address].org is an excellent reference.    Thank you so much for choosing [facility].  Please contact us with any questions that you may have.   We appreciate the opportunity to serve you now and look forward to supporting your healthcare needs for a long time to come!    Most Sincerely,     Ashwini Garcia MD

## 2023-12-14 ENCOUNTER — OFFICE VISIT (OUTPATIENT)
Dept: URGENT CARE | Facility: URGENT CARE | Age: 28
End: 2023-12-14
Payer: COMMERCIAL

## 2023-12-14 VITALS
TEMPERATURE: 98 F | SYSTOLIC BLOOD PRESSURE: 135 MMHG | OXYGEN SATURATION: 98 % | DIASTOLIC BLOOD PRESSURE: 84 MMHG | HEART RATE: 85 BPM

## 2023-12-14 DIAGNOSIS — H69.93 DYSFUNCTION OF BOTH EUSTACHIAN TUBES: Primary | ICD-10-CM

## 2023-12-14 DIAGNOSIS — J06.9 VIRAL URI WITH COUGH: ICD-10-CM

## 2023-12-14 PROCEDURE — 99213 OFFICE O/P EST LOW 20 MIN: CPT | Performed by: PHYSICIAN ASSISTANT

## 2023-12-14 RX ORDER — FLUTICASONE PROPIONATE 50 MCG
1 SPRAY, SUSPENSION (ML) NASAL DAILY
Qty: 16 G | Refills: 0 | Status: SHIPPED | OUTPATIENT
Start: 2023-12-14 | End: 2024-01-25

## 2023-12-14 ASSESSMENT — ENCOUNTER SYMPTOMS
FEVER: 0
RHINORRHEA: 0
SORE THROAT: 0
COUGH: 1

## 2023-12-14 NOTE — PROGRESS NOTES
Assessment & Plan:        ICD-10-CM    1. Dysfunction of both eustachian tubes  H69.93 fluticasone (FLONASE) 50 MCG/ACT nasal spray      2. Viral URI with cough  J06.9             Plan/Clinical Decision Making:    Patient with recent URI symptoms with lingering cough with acute plugged ears L >R. Normal ear exam today. Will treat with daily Flonase.   Follow-up in 1-2 weeks if not improving.     At the end of the encounter, I discussed results, diagnosis, medications. Discussed red flags for immediate return to clinic/ER, as well as indications for follow up if no improvement. Patient understood and agreed to plan. Patient was stable for discharge.        Aure Willams PA-C on 12/14/2023 at 2:05 PM          Subjective:     HPI:    Micki is a 28 year old female who presents to clinic today for the following health issues:  Chief Complaint   Patient presents with    Urgent Care     Bilateral clogged ears since Saturday. Recovering from a cold, slight cough with mucus.      HPI    Patient has had mild cold symptoms with lingering cough.   Has clogged ears for about 4 days. L > R.   No injury or trauma. No drainage.     Review of Systems   Constitutional:  Negative for fever.   HENT:  Positive for postnasal drip. Negative for congestion, ear pain, rhinorrhea and sore throat.    Respiratory:  Positive for cough (lingering).          Patient Active Problem List   Diagnosis    Blood type AB+ - no need for rhogam    Encounter for supervision of other normal pregnancy, third trimester    CARDIOVASCULAR SCREENING; LDL GOAL LESS THAN 160    Unplanned wanted pregnancy - x 2- both on ocp's - 2nd on micronor     Cervical high risk HPV (human papillomavirus) test positive    Anxiety    Irritability and anger    Dysplastic nevus of trunk - Compound dysplastic nevus with moderate to severe atypia s/p MOHS procedure - Dr. Clay     Benign paroxysmal positional vertigo of right ear    Strain of neck muscle, initial encounter     Indication for care in labor or delivery    Encounter for elective induction of labor    Elevated blood pressure reading without diagnosis of hypertension    Eczema, unspecified type- bilateral antecubital areas, right lateral abdomen and left upper chest     Fear of flying    Needs flu shot        Past Medical History:   Diagnosis Date    Blood type AB+     Cervical high risk HPV (human papillomavirus) test positive 2015    age20, normal pap    Depressive disorder 3/2017     (normal spontaneous vaginal delivery) 2014    Status post induction of labor for low ALBERT and post-dates 2014    Streptococcus infection in conditions classified elsewhere and of unspecified site, group A     h/o    Unplanned wanted pregnancy     x 2- both on ocp's - 2nd on micronor        Social History     Tobacco Use    Smoking status: Never    Smokeless tobacco: Never   Substance Use Topics    Alcohol use: Not Currently             Objective:     Vitals:    23 1351   BP: 135/84   BP Location: Right arm   Patient Position: Sitting   Cuff Size: Adult Regular   Pulse: 85   Temp: 98  F (36.7  C)   TempSrc: Tympanic   SpO2: 98%         Physical Exam   EXAM:   Pleasant, alert, appropriate appearance. NAD.  Head Exam: Normocephalic, atraumatic.  Eye Exam:  non icteric/injection.    Ear Exam: TMs grey without bulging. Normal canals.  Normal pinna.  Nose Exam: Normal external nose.  Mild nasal congestion  OroPharynx Exam:  Moist mucous membranes. No erythema, pharynx without exudate or hypertrophy.   Neck/Thyroid Exam:  No LAD.    Chest/Respiratory Exam: CTAB.  Cardiovascular Exam: RRR. No murmur or rubs.      Results:  No results found for any visits on 23.

## 2024-01-08 ENCOUNTER — E-VISIT (OUTPATIENT)
Dept: FAMILY MEDICINE | Facility: CLINIC | Age: 29
End: 2024-01-08
Payer: COMMERCIAL

## 2024-01-08 DIAGNOSIS — R05.1 ACUTE COUGH: ICD-10-CM

## 2024-01-08 DIAGNOSIS — H65.92 OME (OTITIS MEDIA WITH EFFUSION), LEFT: Primary | ICD-10-CM

## 2024-01-08 PROCEDURE — 99422 OL DIG E/M SVC 11-20 MIN: CPT | Performed by: FAMILY MEDICINE

## 2024-01-08 RX ORDER — AMOXICILLIN 500 MG/1
500 CAPSULE ORAL 2 TIMES DAILY
Qty: 40 CAPSULE | Refills: 0 | Status: SHIPPED | OUTPATIENT
Start: 2024-01-08 | End: 2024-01-18

## 2024-01-09 NOTE — PATIENT INSTRUCTIONS
Thank you for choosing us for your care. I have placed an order for a prescription so that you can start treatment. View your full visit summary for details by clicking on the link below. Your pharmacist will able to address any questions you may have about the medication.     If you're not feeling better within 5-7 days, please schedule an appointment.  You can schedule an appointment right here in [facility], or call [phone]  If the visit is for the same symptoms as your eVisit, we'll refund the cost of your eVisit if seen within seven days.           Thank you so much for doing an e-visit with us today. And, again, thank you  for choosing our [facility].  Please contact us with any questions that you may have.   We appreciate the opportunity to serve you now and look forward to supporting your healthcare needs for a long time to come!    Our clinic for the foreseeable future and until further notice , will continue to offer virtual visits, including telephone visits, video visits,  and e-visits, especially during this period of COVID-19 coronavirus pandemic.  Please call to schedule another one if you need it!        Most Sincerely,     Ashwini Garcia MD                                              To reach your [facility] care team after hours call:   [phone]    PLEASE NOTE OUR HOURS HAVE CHANGED secondary to COVID-19 coronavirus pandemic, as we are trying to minimize patient exposure to the virus,  which is now widespread in the [facility].  These hours may change with very little notice.  We apologize for any inconvenience.       Our current clinic hours are:    Our current clinic hours are:         Monday- Thursday   7:00am - 6:00pm  in person.      Friday  7:00am- 5:00pm                       Saturday and Sunday : Closed to in person and virtual visits        We have telephone and virtual visit times available between    7:00am - 6pm on  Monday-Friday as well.                                                Phone:  [phone]      Our pharmacy hours:Monday  through Friday 8:00am to 5:00pm                        Saturday - 9:00 am to 12 noon         Sunday : Closed.                ###  Please note: at this time we are not accepting any walk-in visits. ###      There is also information available at our web site:  [email or web address]    If your provider ordered any lab tests and you do not receive the results within 10 business days, please call the clinic.    If you need a medication refill please contact your pharmacy.  Please allow 2 business days for your refill to be completed.    Our clinic offers telephone visits and e visits.  Please ask one of your team members to explain more.      Use [employer] (secure email communication and access to your chart) to send your primary care provider a message or make an appointment. Ask someone on your Team how to sign up for [employer].     Pharmacy is drive-thru only at this time secondary to the COVID-19 coronavirus pandemic, as we are trying to minimize patient exposure to the virus,  which is now widespread in the state.        There is also information available at our web site:  [email or web address]    If your provider ordered any lab tests and you do not receive the results within 10 business days, please call the clinic.    If you need a medication refill please contact your pharmacy.  Please allow 3 business days for your refill to be completed.

## 2024-01-10 ENCOUNTER — TELEPHONE (OUTPATIENT)
Dept: FAMILY MEDICINE | Facility: CLINIC | Age: 29
End: 2024-01-10
Payer: COMMERCIAL

## 2024-01-10 NOTE — TELEPHONE ENCOUNTER
FYI - Status Update    Who is Calling: Crystal Message to prescriber - you wrote for 1 cap bid for 10 days,  then prescribed 40 ?  Is that correct?    Update: Qty to confirm    Does caller want a call/response back: mandy    Put in providers in box    Flora K

## 2024-01-11 NOTE — TELEPHONE ENCOUNTER
BRITTANEY - FAXED SIGNED COPY  [phone]    COPIED INTO [facility]/ FILED IN [facility] FOLDER     ASHER LOPEZ

## 2024-01-25 ENCOUNTER — OFFICE VISIT (OUTPATIENT)
Dept: FAMILY MEDICINE | Facility: CLINIC | Age: 29
End: 2024-01-25
Payer: COMMERCIAL

## 2024-01-25 VITALS
OXYGEN SATURATION: 100 % | DIASTOLIC BLOOD PRESSURE: 80 MMHG | HEART RATE: 89 BPM | RESPIRATION RATE: 16 BRPM | TEMPERATURE: 97.4 F | SYSTOLIC BLOOD PRESSURE: 124 MMHG | WEIGHT: 201.8 LBS | BODY MASS INDEX: 32.43 KG/M2 | HEIGHT: 66 IN

## 2024-01-25 DIAGNOSIS — R87.810 CERVICAL HIGH RISK HPV (HUMAN PAPILLOMAVIRUS) TEST POSITIVE: ICD-10-CM

## 2024-01-25 DIAGNOSIS — E66.09 CLASS 1 OBESITY DUE TO EXCESS CALORIES WITHOUT SERIOUS COMORBIDITY WITH BODY MASS INDEX (BMI) OF 32.0 TO 32.9 IN ADULT: ICD-10-CM

## 2024-01-25 DIAGNOSIS — Z00.01 ENCOUNTER FOR ROUTINE ADULT MEDICAL EXAM WITH ABNORMAL FINDINGS: Primary | ICD-10-CM

## 2024-01-25 DIAGNOSIS — F41.9 ANXIETY: ICD-10-CM

## 2024-01-25 DIAGNOSIS — Z30.02 NATURAL FAMILY PLANNING: ICD-10-CM

## 2024-01-25 DIAGNOSIS — E66.811 CLASS 1 OBESITY DUE TO EXCESS CALORIES WITHOUT SERIOUS COMORBIDITY WITH BODY MASS INDEX (BMI) OF 32.0 TO 32.9 IN ADULT: ICD-10-CM

## 2024-01-25 DIAGNOSIS — Z12.4 CERVICAL CANCER SCREENING: ICD-10-CM

## 2024-01-25 DIAGNOSIS — Z13.6 CARDIOVASCULAR SCREENING; LDL GOAL LESS THAN 160: ICD-10-CM

## 2024-01-25 DIAGNOSIS — R03.0 ELEVATED BLOOD PRESSURE READING WITHOUT DIAGNOSIS OF HYPERTENSION: ICD-10-CM

## 2024-01-25 PROBLEM — Z34.90 ENCOUNTER FOR ELECTIVE INDUCTION OF LABOR: Status: RESOLVED | Noted: 2020-05-02 | Resolved: 2024-01-25

## 2024-01-25 LAB
ERYTHROCYTE [DISTWIDTH] IN BLOOD BY AUTOMATED COUNT: 12.9 % (ref 10–15)
HCT VFR BLD AUTO: 37.3 % (ref 35–47)
HGB BLD-MCNC: 12.4 G/DL (ref 11.7–15.7)
MCH RBC QN AUTO: 28.3 PG (ref 26.5–33)
MCHC RBC AUTO-ENTMCNC: 33.2 G/DL (ref 31.5–36.5)
MCV RBC AUTO: 85 FL (ref 78–100)
PLATELET # BLD AUTO: 247 10E3/UL (ref 150–450)
RBC # BLD AUTO: 4.38 10E6/UL (ref 3.8–5.2)
WBC # BLD AUTO: 7.2 10E3/UL (ref 4–11)

## 2024-01-25 PROCEDURE — 36415 COLL VENOUS BLD VENIPUNCTURE: CPT | Performed by: FAMILY MEDICINE

## 2024-01-25 PROCEDURE — 99213 OFFICE O/P EST LOW 20 MIN: CPT | Mod: 25 | Performed by: FAMILY MEDICINE

## 2024-01-25 PROCEDURE — 99395 PREV VISIT EST AGE 18-39: CPT | Mod: 25 | Performed by: FAMILY MEDICINE

## 2024-01-25 PROCEDURE — G0145 SCR C/V CYTO,THINLAYER,RESCR: HCPCS | Performed by: FAMILY MEDICINE

## 2024-01-25 PROCEDURE — 80061 LIPID PANEL: CPT | Performed by: FAMILY MEDICINE

## 2024-01-25 PROCEDURE — 85027 COMPLETE CBC AUTOMATED: CPT | Performed by: FAMILY MEDICINE

## 2024-01-25 RX ORDER — CITALOPRAM HYDROBROMIDE 20 MG/1
20 TABLET ORAL DAILY
Qty: 90 TABLET | Refills: 1 | Status: SHIPPED | OUTPATIENT
Start: 2024-01-25

## 2024-01-25 ASSESSMENT — ENCOUNTER SYMPTOMS
FEVER: 0
WEAKNESS: 0
SORE THROAT: 0
JOINT SWELLING: 0
HEADACHES: 0
FREQUENCY: 0
BREAST MASS: 0
HEMATURIA: 0
DIARRHEA: 0
DYSURIA: 0
DIZZINESS: 0
COUGH: 0
MYALGIAS: 0
CONSTIPATION: 0
NERVOUS/ANXIOUS: 0
HEARTBURN: 0
ARTHRALGIAS: 0
PALPITATIONS: 0
ABDOMINAL PAIN: 0
HEMATOCHEZIA: 0
SHORTNESS OF BREATH: 0
CHILLS: 0
PARESTHESIAS: 0
NAUSEA: 0
EYE PAIN: 0

## 2024-01-25 ASSESSMENT — ANXIETY QUESTIONNAIRES
4. TROUBLE RELAXING: NOT AT ALL
7. FEELING AFRAID AS IF SOMETHING AWFUL MIGHT HAPPEN: NOT AT ALL
1. FEELING NERVOUS, ANXIOUS, OR ON EDGE: NOT AT ALL
GAD7 TOTAL SCORE: 0
GAD7 TOTAL SCORE: 0
5. BEING SO RESTLESS THAT IT IS HARD TO SIT STILL: NOT AT ALL
3. WORRYING TOO MUCH ABOUT DIFFERENT THINGS: NOT AT ALL
6. BECOMING EASILY ANNOYED OR IRRITABLE: NOT AT ALL
GAD7 TOTAL SCORE: 0
2. NOT BEING ABLE TO STOP OR CONTROL WORRYING: NOT AT ALL
7. FEELING AFRAID AS IF SOMETHING AWFUL MIGHT HAPPEN: NOT AT ALL

## 2024-01-25 ASSESSMENT — PATIENT HEALTH QUESTIONNAIRE - PHQ9
SUM OF ALL RESPONSES TO PHQ QUESTIONS 1-9: 2
SUM OF ALL RESPONSES TO PHQ QUESTIONS 1-9: 2
10. IF YOU CHECKED OFF ANY PROBLEMS, HOW DIFFICULT HAVE THESE PROBLEMS MADE IT FOR YOU TO DO YOUR WORK, TAKE CARE OF THINGS AT HOME, OR GET ALONG WITH OTHER PEOPLE: SOMEWHAT DIFFICULT

## 2024-01-25 NOTE — PROGRESS NOTES
Preventive Care Visit  [facility] [place]  Ashwini Garcia MD, Family Medicine  2024       SUBJECTIVE:   Micki is a 28 year old, presenting for the followin. Encounter for routine adult medical exam with abnormal findings    2. Cervical cancer screening    3. Anxiety- doing well - needs refills on med    4. Natural family planning - & condoms working well    5. Cervical high risk HPV (human papillomavirus) test positive - in     6. Elevated blood pressure reading without diagnosis of hypertension- mild - transient - postpartum in - resolved    7. Class 1 obesity due to excess calories without serious comorbidity with body mass index (BMI) of 32.0 to 32.9 in adult    8. CARDIOVASCULAR SCREENING; LDL GOAL LESS THAN 160        Physical        2024     1:45 PM   Additional Questions   Roomed by Flora CMA   Accompanied by Self     Healthy Habits:     Getting at least 3 servings of Calcium per day:  Yes    Bi-annual eye exam:  Yes    Dental care twice a year:  Yes    Sleep apnea or symptoms of sleep apnea:  None    Diet:  Regular (no restrictions)    Frequency of exercise:  2-3 days/week    Duration of exercise:  45-60 minutes    Taking medications regularly:  Yes    Medication side effects:  None    Additional concerns today:  No    Today's PHQ-9 Score:       2024    10:08 AM   PHQ-9 SCORE   PHQ-9 Total Score MyChart 2 (Minimal depression)   PHQ-9 Total Score 2       Anxiety Follow-Up  How are you doing with your anxiety since your last visit? No change  Are you having other symptoms that might be associated with anxiety? No  Have you had a significant life event? No   Are you feeling depressed? No  Do you have any concerns with your use of alcohol or other drugs? No    Weight - down 10 lbs from previous. Working on cutting down on extra calories. Working out a bit more.   Doing more resistance training as well with her .      Social History     Tobacco Use     Smoking status: Never    Smokeless tobacco: Never   Vaping Use    Vaping Use: Never used   Substance Use Topics    Alcohol use: Not Currently    Drug use: No         9/13/2021    11:19 AM 1/27/2022     3:00 PM 1/25/2024    10:08 AM   ZULMA-7 SCORE   Total Score  1 (minimal anxiety) 0 (minimal anxiety)   Total Score 1 1 0         9/13/2021    11:19 AM 1/27/2022     3:00 PM 1/25/2024    10:08 AM   PHQ   PHQ-9 Total Score 0 2 2   Q9: Thoughts of better off dead/self-harm past 2 weeks Not at all Not at all Not at all         1/25/2024    10:08 AM   Last PHQ-9   1.  Little interest or pleasure in doing things 0   2.  Feeling down, depressed, or hopeless 0   3.  Trouble falling or staying asleep, or sleeping too much 1   4.  Feeling tired or having little energy 1   5.  Poor appetite or overeating 0   6.  Feeling bad about yourself 0   7.  Trouble concentrating 0   8.  Moving slowly or restless 0   Q9: Thoughts of better off dead/self-harm past 2 weeks 0   PHQ-9 Total Score 2         1/25/2024    10:08 AM   ZULMA-7    1. Feeling nervous, anxious, or on edge 0   2. Not being able to stop or control worrying 0   3. Worrying too much about different things 0   4. Trouble relaxing 0   5. Being so restless that it is hard to sit still 0   6. Becoming easily annoyed or irritable 0   7. Feeling afraid, as if something awful might happen 0   ZULMA-7 Total Score 0         Social History     Tobacco Use    Smoking status: Never    Smokeless tobacco: Never   Substance Use Topics    Alcohol use: Not Currently             1/25/2024    10:12 AM   Alcohol Use   Prescreen: >3 drinks/day or >7 drinks/week? No          No data to display              Reviewed orders with patient.  Reviewed health maintenance and updated orders accordingly - Yes  Lab work is in process  Labs reviewed in EPIC  BP Readings from Last 3 Encounters:   01/25/24 124/80   12/14/23 135/84   10/09/23 122/88    Wt Readings from Last 3 Encounters:   01/25/24 91.5 kg (201 lb  12.8 oz)   10/09/23 95.3 kg (210 lb)   01/27/22 83.5 kg (184 lb)                  Patient Active Problem List   Diagnosis    Blood type AB+ - no need for rhogam    CARDIOVASCULAR SCREENING; LDL GOAL LESS THAN 160    Unplanned wanted pregnancy - x 2- both on ocp's - 2nd on micronor     Cervical high risk HPV (human papillomavirus) test positive    Anxiety    Irritability and anger    Dysplastic nevus of trunk - Compound dysplastic nevus with moderate to severe atypia s/p MOHS procedure - Dr. Clay     Benign paroxysmal positional vertigo of right ear    Strain of neck muscle, initial encounter    Indication for care in labor or delivery    Elevated blood pressure reading without diagnosis of hypertension- mild - transient - postpartum in 2020    Eczema, unspecified type- bilateral antecubital areas, right lateral abdomen and left upper chest     Fear of flying    Needs flu shot    Natural family planning - & condoms working well    Class 1 obesity due to excess calories without serious comorbidity with body mass index (BMI) of 32.0 to 32.9 in adult     Past Surgical History:   Procedure Laterality Date    APPENDECTOMY  3/09    dr francois     REMOVE TONSILS/ADENOIDS,<13 Y/O  10/01    T & A <11y.o.    MOHS MICROGRAPHIC PROCEDURE      left upper abd -Compound dysplastic nevus with moderate to severe atypia        Social History     Tobacco Use    Smoking status: Never    Smokeless tobacco: Never   Substance Use Topics    Alcohol use: Yes     Family History   Problem Relation Age of Onset    Lipids Mother     Lipids Father     Diabetes Paternal Grandmother     C.A.D. Maternal Grandfather     Lipids Maternal Grandfather     Diabetes Maternal Grandfather     Diabetes Maternal Grandmother     Lipids Maternal Grandmother     C.A.D. Maternal Grandmother     Cancer - colorectal Maternal Grandmother 73    Skin Cancer Maternal Aunt     Familial Atypical Mole-Malignant Melanoma Syndrome Paternal Cousin          Current  Outpatient Medications   Medication Sig Dispense Refill    citalopram (CELEXA) 20 MG tablet TAKE 1 TABLET BY MOUTH EVERY DAY 90 tablet 1    LORazepam (ATIVAN) 0.5 MG tablet Take 1 tablet (0.5 mg) by mouth as needed for anxiety (flight anxiety) 30 MINUTES PRIOR TO FLIGHT - NO ALCOHOL OR OTHER SEDATING MEDS WITHIN 8 HOURS OF THIS 8 tablet 0     No Known Allergies  Recent Labs   Lab Test 10/09/23  1459 22  1624 20  1248 20  1300 10/05/18  1509 17  1646 12/14/15  1659   LDL  --  82  --   --   --   --   --    HDL  --  47*  --   --   --   --   --    TRIG  --  61  --   --   --   --   --    ALT  --   --  43 19  --   --  15   CR  --   --  0.66 0.55  --   --   --    GFRESTIMATED  --   --  >90 >90  --   --   --    GFRESTBLACK  --   --  >90 >90  --   --   --    POTASSIUM  --   --  4.0 4.0  --   --   --    TSH 2.71  --   --   --  2.11   < >  --     < > = values in this interval not displayed.        Breast Cancer Screenin/27/2022     3:04 PM   Breast CA Risk Assessment (FHS-7)   Do you have a family history of breast, colon, or ovarian cancer? No / Unknown       click delete button to remove this line now  Patient under 40 years of age: Routine Mammogram Screening not recommended.   Pertinent mammograms are reviewed under the imaging tab.    History of abnormal Pap smear: NO - age 30- 65 PAP every 3 years recommended      Latest Ref Rng & Units 10/9/2023     2:30 PM 2020    12:26 PM 2020    12:10 PM   PAP / HPV   PAP  Negative for Intraepithelial Lesion or Malignancy (NILM)      PAP (Historical)   NIL     HPV 16 DNA NEG^Negative   Negative    HPV 18 DNA NEG^Negative   Negative    Other HR HPV NEG^Negative   Negative      Reviewed and updated as needed this visit by clinical staff   Tobacco  Allergies  Meds              Reviewed and updated as needed this visit by Provider                  Past Medical History:   Diagnosis Date    Blood type AB+     Cervical high risk HPV (human  papillomavirus) test positive 2015    age18, normal pap    Depressive disorder 2017    Encounter for elective induction of labor 2020    Encounter for supervision of other normal pregnancy, third trimester 2014     (normal spontaneous vaginal delivery) 2014    Status post induction of labor for low ALBERT and post-dates 2014    Streptococcus infection in conditions classified elsewhere and of unspecified site, group A     h/o    Unplanned wanted pregnancy     x 2- both on ocp's - 2nd on micronor       Past Surgical History:   Procedure Laterality Date    APPENDECTOMY  3/09    dr francois    HC REMOVE TONSILS/ADENOIDS,<11 Y/O  10/01    T & A <11y.o.    MOHS MICROGRAPHIC PROCEDURE      left upper abd -Compound dysplastic nevus with moderate to severe atypia      OB History    Para Term  AB Living   3 3 3 0 0 3   SAB IAB Ectopic Multiple Live Births   0 0 0 0 3      # Outcome Date GA Lbr Rufino/2nd Weight Sex Delivery Anes PTL Lv   3 Term 20 39w6d 02:22  00:14 3.57 kg (7 lb 13.9 oz) F Vag-Spont EPI N JEFFERY      Name: Pavan Holman       Apgar1: 6  Apgar5: 9   2 Term 16 40w5d 04:13  00:33 3.32 kg (7 lb 5.1 oz) M Vag-Spont EPI, IV  JEFFERY      Birth Comments: none - long umbilical cord - had CAN x 1 reduced prior to delivery of fetal shoulders and loose cord around left leg x 2       Name: Jefry Escobaraleks       Apgar1: 8  Apgar5: 9   1 Term 14 40w3d 03:14 / 01:42 3.175 kg (7 lb) F Vag-Spont EPI, IV  JEFFERY      Birth Comments: none       Name: Lotus Holman       Apgar1: 9  Apgar5: 9     Review of Systems   Constitutional:  Negative for chills and fever.   HENT:  Negative for congestion, ear pain, hearing loss and sore throat.    Eyes:  Negative for pain and visual disturbance.   Respiratory:  Negative for cough and shortness of breath.    Cardiovascular:  Negative for chest pain and palpitations.   Gastrointestinal:  Negative for abdominal pain, constipation,  "diarrhea and nausea.   Genitourinary:  Positive for vaginal bleeding. Negative for dysuria, frequency, genital sores, hematuria, pelvic pain, urgency and vaginal discharge.   Musculoskeletal:  Negative for arthralgias, joint swelling and myalgias.   Skin:  Negative for rash.   Neurological:  Negative for dizziness, weakness and headaches.   Psychiatric/Behavioral:  The patient is not nervous/anxious.          OBJECTIVE:   /80   Pulse 89   Temp 97.4  F (36.3  C) (Tympanic)   Resp 16   Ht 1.676 m (5' 6\")   Wt 91.5 kg (201 lb 12.8 oz)   LMP 01/24/2024 (Exact Date)   SpO2 100%   Breastfeeding No   BMI 32.57 kg/m     Estimated body mass index is 32.57 kg/m  as calculated from the following:    Height as of this encounter: 1.676 m (5' 6\").    Weight as of this encounter: 91.5 kg (201 lb 12.8 oz).  Physical Exam  GENERAL: alert and no distress  EYES: Eyes grossly normal to inspection, PERRL and conjunctivae and sclerae normal  HENT: ear canals and TM's normal, nose and mouth without ulcers or lesions  NECK: no adenopathy, no asymmetry, masses, or scars  RESP: lungs clear to auscultation - no rales, rhonchi or wheezes  BREAST: normal without masses, tenderness or nipple discharge and no palpable axillary masses or adenopathy  CV: regular rate and rhythm, normal S1 S2, no S3 or S4, no murmur, click or rub, no peripheral edema  ABDOMEN: soft, nontender, no hepatosplenomegaly, no masses and bowel sounds normal   (female): normal female external genitalia, normal urethral meatus, vaginal mucosa pink, moist, well rugated  MS: no gross musculoskeletal defects noted, no edema  SKIN: no suspicious lesions or rashes  NEURO: Normal strength and tone, mentation intact and speech normal  PSYCH: mentation appears normal, affect normal/bright  LYMPH: no cervical, supraclavicular, axillary, or inguinal adenopathy    Diagnostic Test Results:  Labs reviewed in Epic    ASSESSMENT/PLAN:       ICD-10-CM    1. Encounter for " routine adult medical exam with abnormal findings  Z00.01       2. Cervical cancer screening  Z12.4 Pap Screen reflex to HPV if ASCUS - recommend age 25 - 29      3. Anxiety- doing well - needs refills on med  F41.9 citalopram (CELEXA) 20 MG tablet      4. Natural family planning - & condoms working well  Z30.02       5. Cervical high risk HPV (human papillomavirus) test positive - in 2015  R87.810       6. Elevated blood pressure reading without diagnosis of hypertension- mild - transient - postpartum in 2020- resolved  R03.0       7. Class 1 obesity due to excess calories without serious comorbidity with body mass index (BMI) of 32.0 to 32.9 in adult  E66.09     Z68.32       8. CARDIOVASCULAR SCREENING; LDL GOAL LESS THAN 160  Z13.6 Lipid panel reflex to direct LDL Non-fasting     CBC with platelets        Please, call our clinic or go to the ER immediately if signs or symptoms worsen or fail to improve as anticipated.     Patient has been advised of split billing requirements and indicates understanding: Yes    Return in about 6 months (around 7/25/2024) for anxiety, as video visit, w/ Dr Garcia.     Counseling:   Reviewed preventive health counseling, as reflected in patient instructions        She reports that she has never smoked. She has never used smokeless tobacco.             Signed Electronically by: Ashwini Garcia MD  Answers submitted by the patient for this visit:  Patient Health Questionnaire (Submitted on 1/25/2024)  If you checked off any problems, how difficult have these problems made it for you to do your work, take care of things at home, or get along with other people?: Somewhat difficult  PHQ9 TOTAL SCORE: 2  ZULMA-7 (Submitted on 1/25/2024)  ZULMA 7 TOTAL SCORE: 0  Annual Preventive Visit (Submitted on 1/25/2024)  Chief Complaint: Annual Exam:  Blood in stool: No  heartburn: No  peripheral edema: No  mood changes: No  Skin sensation changes: No  tenderness: No  breast mass:  No  breast discharge: No

## 2024-01-25 NOTE — PATIENT INSTRUCTIONS
[facility]  [address] [place] [address]  Office: [phone]   Fax:    [phone]       Return in about 6 months (around 7/25/2024) for anxiety, as video visit, w/ Dr Garcia.     Discussed need for resistance training to help keep bones strong and decrease age -related muscle loss, decreasing insulin resistance and increasing basal metabolic rate to help burn more calories at rest and with exertion.       Clinical References    Resistance Training With Free Weights: Exercises  Introduction  Here are some examples of exercises for resistance training. Start each exercise slowly. Ease off the exercise if you start to have pain.  Your doctor or physical therapist will tell you when you can start these exercises and which ones will work best for you.  How to do the exercises  Chest fly    Lie on a bench or exercise ball, and hold the weights straight up over your chest. Do not lock your elbows. You can keep them slightly bent if that is comfortable for you.  Slowly lower your arms, keeping them extended, until the weights are level with your chest, or slightly lower.  Slowly raise your arms until you are in the starting position.  Repeat 8 to 12 times.  Rest for a minute, and repeat the exercise.  Arm raise to the side (elbows bent)    Stand with your feet shoulder-width apart and your knees slightly bent. Or sit up straight in a chair.  Hold a 1- to 2-pound weight in each hand. The weight may be a dumbbell, a can of food, or a filled water bottle.  Bend your elbows 90 degrees while keeping them at your sides. With your palms facing in, hold the weights straight in front of you.  Slowly lift the weights and your elbows out to the sides to shoulder level, keeping your elbows bent. Keep your shoulders down and relaxed as you lift. If you find that you are shrugging your shoulders up toward your ears, your weights may be too heavy. Try using lighter weights (or even no  weights).  Slowly lower the weights and your elbows until your elbows are back at your sides.  Repeat 8 to 12 times.  Biceps curls    Sit leaning forward with your legs slightly spread and your left hand on your left thigh.  Hold the weight in your right hand, and place your right elbow on your right thigh.  Slowly curl the weight up and toward your chest.  Slowly lower the weight to the original position.  Repeat 8 to 12 times.  Rest for a minute, and repeat the exercise.  Do the same exercise with your other arm.  Follow-up care is a key part of your treatment and safety. Be sure to make and go to all appointments, and call your doctor if you are having problems. It's also a good idea to know your test results and keep a list of the medicines you take.  Current as of: June 6, 2023               Content Version: 13.8    2650-3802 [employer].   Care instructions adapted under license by your healthcare professional. If you have questions about a medical condition or this instruction, always ask your healthcare professional. [employer] disclaims any warranty or liability for your use of this information.        Resistance Training With Surgical Tubing: Exercises  Introduction  Here are some examples of exercises for resistance training. Start each exercise slowly. Ease off the exercise if you start to have pain.  Your doctor or physical therapist will tell you when you can start these exercises and which ones will work best for you.  How to do the exercises  Side pull    Sit or stand up straight. Grasp an exercise band with your hands about shoulder-width apart.  Raise both arms overhead, palms of your hands facing forward.  Slowly pull one arm down and to the side, bending your elbow and stretching the band until your elbow is at shoulder height. Hold for 1 to 2 seconds.  Slowly return to the starting position with your arms straight up.  Repeat with the other arm.  Repeat 8 to 12 times  with each arm.  Overhead pull    Sit or stand up straight. Grasp an exercise band with your hands about shoulder-width apart.  Raise both arms overhead, palms of your hands facing forward.  Slowly pull your hands apart, stretching the band. Hold for 1 to 2 seconds.  Slowly return to the starting position with your arms straight up.  Repeat 8 to 12 times.  Up-down pull    Sit or stand up straight. Grasp an exercise band with your hands about shoulder width apart.  Raise both arms overhead.  Bend your elbows until they are at shoulder height, with the stretched band either behind or in front of your head. Hold for 1 to 2 seconds.  Slowly return to the starting position with your arms straight up.  Repeat 8 to 12 times.  Chest-level pull    Sit or stand up straight. Grasp an exercise band with your hands about shoulder-width apart.  Raise your arms to chest level and bend your elbows.  Slowly pull your hands apart and your shoulder blades together, stretching the band. Hold for 1 to 2 seconds. Try to keep your hands up at your chest level, and do not pull your shoulders up toward your ears.  Slowly return to your starting position.  Repeat 8 to 12 times.  Hip-level pull    Stand or sit up straight in a chair without arms. Grasp an exercise band with your hands about shoulder-width apart.  Hold your hands at the level of your hips, or near your lap if you are sitting down.  Slowly pull your hands apart, stretching the band. Hold for 1 or 2 seconds.  Slowly return to your starting position.  Repeat 8 to 12 times.  Follow-up care is a key part of your treatment and safety. Be sure to make and go to all appointments, and call your doctor if you are having problems. It's also a good idea to know your test results and keep a list of the medicines you take.  Current as of: June 6, 2023               Content Version: 13.8    3141-0266 [employer].   Care instructions adapted under license by [facility] [employer]  professional. If you have questions about a medical condition or this instruction, always ask your healthcare professional. [employer] disclaims any warranty or liability for your use of this information.         Fitness: Increasing Your Core Stability (02:03)  Your health professional recommends that you watch this short online health video.  Learn how to keep your core strong and prevent injury with two simple exercises.  Purpose:  Shows belly tightening and bridging to improve core stability. Emphasizes how a strong core helps prevent injury.  Goal:  The user will learn belly tightening and bridging to improve core stability.      How to watch the video     Scan the QR code   OR Visit the website    [email or web address]   Current as of: July 18, 2023               Content Version: 13.8    3192-0604 [employer].   Care instructions adapted under license by your healthcare professional. If you have questions about a medical condition or this instruction, always ask your healthcare professional. [employer] disclaims any warranty or liability for your use of this information.          Muscle Conditioning: Exercises  Introduction  Here are some examples of exercises for muscle conditioning. Start each exercise slowly. Ease off the exercise if you start to have pain.  Your doctor or physical therapist will tell you when you can start these exercises and which ones will work best for you.  How to do the exercises  Wall push-ups    When you can do this exercise against a wall comfortably (without your muscles feeling tired), you can try it against a counter. Start with 5 repetitions again and work up to 8 to 12. You can then slowly progress to the end of a couch or a sturdy chair, and finally to the floor.  Stand facing a wall, about 12 to 18 inches away.  Place your hands on the wall at shoulder height.  Slowly bend your elbows and bring your face toward  the wall, moving your hips and shoulders forward together.  Push slowly back to the starting position.  Start with 5 repetitions and work up to 8 to 12.  Rest for a minute, and repeat the exercise.    Side-lying leg lift    If this exercise becomes easy, you can add a light weight around your ankle or tie an elastic resistance band to both ankles.  Lie on your side, with your legs extended. Keep your hips straight up and down during this exercise. Do not let your top hip rock toward the back. Support your head with your hand, and place the other hand on the floor near your waist.  Slowly raise your upper leg until it is about in line with your shoulder. Keep your toes pointed forward.  Slowly lower your leg to the starting position.  Repeat 8 to 12 times.  Rest for a minute, and repeat the exercise.  Turn to your other side and do the same exercise with your other leg.  Shallow standing knee bends    Stand with your hands lightly resting on a counter or chair in front of you with your feet shoulder-width apart.  Slowly bend your knees so that you squat down just like you were going to sit in a chair. Make sure your knees do not go in front of your toes.  Lower yourself about 6 inches. Your heels should remain on the floor at all times.  Rise slowly to a standing position.  Repeat 8 to 12 times.  Rest for a minute, and repeat the exercise.  Follow-up care is a key part of your treatment and safety. Be sure to make and go to all appointments, and call your doctor if you are having problems. It's also a good idea to know your test results and keep a list of the medicines you take.  Current as of: June 6, 2023               Content Version: 13.8    5221-4010 [employer].   Care instructions adapted under license by your healthcare professional. If you have questions about a medical condition or this instruction, always ask your healthcare professional. [employer] disclaims any warranty or  liability for your use of this information.         How to Do the Wall Sit Exercise (00:46)  Your health professional recommends that you watch this short online health video.  Learn how to do the wall sit exercise to increase strength and stability in your core and your legs.  Purpose:  Demonstrates the steps required to perform the wall sit exercise to increase strength and stability in the core and legs.  Goal:  The user will learn how to do the wall sit exercise to increase strength and stability in the core and legs.      How to watch the video     Scan the QR code   OR Visit the website    [email or web address]   Current as of: July 18, 2023               Content Version: 13.8    4304-8166 [employer].   Care instructions adapted under license by your healthcare professional. If you have questions about a medical condition or this instruction, always ask your healthcare professional. [employer] disclaims any warranty or liability for your use of this information.         Preventive Health Recommendations  Female Ages 26 - 39  Yearly exam:   See your health care provider every year in order to  Review health changes.   Discuss preventive care.    Review your medicines if you your doctor has prescribed any.    Until age 30: Get a Pap test every three years (more often if you have had an abnormal result).    After age 30: Talk to your doctor about whether you should have a Pap test every 3 years or have a Pap test with HPV screening every 5 years.   You do not need a Pap test if your uterus was removed (hysterectomy) and you have not had cancer.  You should be tested each year for STDs (sexually transmitted diseases), if you're at risk.   Talk to your provider about how often to have your cholesterol checked.  If you are at risk for diabetes, you should have a diabetes test (fasting glucose).  Shots: Get a flu shot each year. Get a tetanus shot every 10 years.  "  Nutrition:   Eat at least 5 servings of fruits and vegetables each day.  Eat whole-grain bread, whole-wheat pasta and brown rice instead of white grains and rice.  Get adequate Calcium and Vitamin D.     Lifestyle  Exercise at least 150 minutes a week (30 minutes a day, 5 days of the week). This will help you control your weight and prevent disease.  Limit alcohol to one drink per day.  No smoking.   Wear sunscreen to prevent skin cancer.  See your dentist every six months for an exam and cleaning.  Thank you so much or choosing [facility]  for your Health Care. It was a pleasure seeing you at your visit today! Please contact us with any questions or concerns you may have.                   Ashwini Garcia MD                              To reach your [facility] care team after hours call:   [phone] press #2 \"to speak with your care team\".  This will get you to our clinic instead of routing to central [facility]  scheduling.     PLEASE NOTE OUR HOURS HAVE CHANGED secondary to COVID-19 coronavirus pandemic, as we are trying to minimize patient exposure to the virus,  which is now widespread in the [facility].  These hours may change with very little notice.  We apologize for any inconvenience.       Our current clinic hours are:          Monday- Thursday   7:00am - 6:00pm  in person.      Friday  7:00am- 5:00pm                       Saturday and Sunday : Closed to in person and virtual visits        We have telephone and virtual visit times available between    7:00am - 6pm on Monday-Friday as well.                                                Phone:  [phone]      Our pharmacy hours: Monday through Friday 8:00am to 5:00pm                        Saturday - 9:00 am to 12 noon       Sunday : Closed.              Phone:  [phone]              ###  Please note: at this time we are not accepting any walk-in visits. ###      There is also " information available at our web site:  [email or web address]    If your provider ordered any lab tests and you do not receive the results within 10 business days, please call the clinic.    If you need a medication refill please contact your pharmacy.  Please allow 3 business days for your refill to be completed.    Our clinic offers telephone visits and e visits.  Please ask one of your team members to explain more.      Use [employer] (secure email communication and access to your chart) to send your primary care provider a message or make an appointment. Ask someone on your Team how to sign up for [employer].

## 2024-01-26 LAB
CHOLEST SERPL-MCNC: 170 MG/DL
FASTING STATUS PATIENT QL REPORTED: YES
HDLC SERPL-MCNC: 47 MG/DL
LDLC SERPL CALC-MCNC: 110 MG/DL
NONHDLC SERPL-MCNC: 123 MG/DL
TRIGL SERPL-MCNC: 66 MG/DL

## 2024-03-01 NOTE — RESULT ENCOUNTER NOTE
Dear Micki,     I was looking over some previous lab results and noted that I hadn't sent you a note on them as yet.     Thank you for your patience in getting my comments on your recent results to you.  My sincerest apologies in not getting these to you sooner.     All your last labs that I ordered are normal, improved, or pretty stable from previous.     Please, continue your current medications and/or supplements and follow up as we discussed at your last visit.     For additional lab test information, [email or web address] is an excellent reference.    Thank you so much for choosing [facility].  Please contact us with any questions that you may have.   We appreciate the opportunity to serve you now and look forward to supporting your healthcare needs for a long time to come!    Most Sincerely,     Ashwini Garcia MD

## 2024-04-10 NOTE — PATIENT INSTRUCTIONS
Pregnancy test positive  -     US OB <14 Weeks w Transvaginal Single; Future - schedule dating ultrasound after 9/24/19.      Results for orders placed or performed in visit on 09/05/19   HCG Qual, Urine ([id number])   Result Value Ref Range    HCG Qual Urine Positive (A) NEG^Negative          Orders Placed This Encounter    Norethin Ace-Eth Estrad-FE (JUNEL FE 24) 1-20 MG-MCG(24) TABS     Sig: Take 1 tablet by mouth daily     Dispense:  3 packet     Refill:  3    vitamin D (ERGOCALCIFEROL) 1.25 MG (87135 UT) CAPS capsule     Sig: Take 1 capsule by mouth once a week     Dispense:  8 capsule     Refill:  0

## 2024-09-04 ENCOUNTER — OFFICE VISIT (OUTPATIENT)
Dept: FAMILY MEDICINE | Facility: CLINIC | Age: 29
End: 2024-09-04
Payer: COMMERCIAL

## 2024-09-04 VITALS
RESPIRATION RATE: 18 BRPM | BODY MASS INDEX: 33.28 KG/M2 | TEMPERATURE: 98.5 F | DIASTOLIC BLOOD PRESSURE: 80 MMHG | OXYGEN SATURATION: 99 % | WEIGHT: 207.1 LBS | SYSTOLIC BLOOD PRESSURE: 120 MMHG | HEIGHT: 66 IN | HEART RATE: 102 BPM

## 2024-09-04 DIAGNOSIS — L63.9 ALOPECIA AREATA: Primary | ICD-10-CM

## 2024-09-04 DIAGNOSIS — L65.0 TELOGEN EFFLUVIUM: ICD-10-CM

## 2024-09-04 DIAGNOSIS — L70.0 CYSTIC ACNE: ICD-10-CM

## 2024-09-04 DIAGNOSIS — B07.8 OTHER VIRAL WARTS: ICD-10-CM

## 2024-09-04 PROCEDURE — 83002 ASSAY OF GONADOTROPIN (LH): CPT | Performed by: FAMILY MEDICINE

## 2024-09-04 PROCEDURE — 17110 DESTRUCTION B9 LES UP TO 14: CPT | Performed by: FAMILY MEDICINE

## 2024-09-04 PROCEDURE — 36415 COLL VENOUS BLD VENIPUNCTURE: CPT | Performed by: FAMILY MEDICINE

## 2024-09-04 PROCEDURE — 84443 ASSAY THYROID STIM HORMONE: CPT | Performed by: FAMILY MEDICINE

## 2024-09-04 PROCEDURE — 83001 ASSAY OF GONADOTROPIN (FSH): CPT | Performed by: FAMILY MEDICINE

## 2024-09-04 PROCEDURE — 99214 OFFICE O/P EST MOD 30 MIN: CPT | Mod: 25 | Performed by: FAMILY MEDICINE

## 2024-09-04 PROCEDURE — 84403 ASSAY OF TOTAL TESTOSTERONE: CPT | Performed by: FAMILY MEDICINE

## 2024-09-04 PROCEDURE — 80048 BASIC METABOLIC PNL TOTAL CA: CPT | Performed by: FAMILY MEDICINE

## 2024-09-04 PROCEDURE — 82670 ASSAY OF TOTAL ESTRADIOL: CPT | Performed by: FAMILY MEDICINE

## 2024-09-04 PROCEDURE — 84144 ASSAY OF PROGESTERONE: CPT | Performed by: FAMILY MEDICINE

## 2024-09-04 RX ORDER — SPIRONOLACTONE 50 MG/1
50 TABLET, FILM COATED ORAL DAILY
Qty: 90 TABLET | Refills: 1 | Status: SHIPPED | OUTPATIENT
Start: 2024-09-04

## 2024-09-04 ASSESSMENT — ANXIETY QUESTIONNAIRES
GAD7 TOTAL SCORE: 0
8. IF YOU CHECKED OFF ANY PROBLEMS, HOW DIFFICULT HAVE THESE MADE IT FOR YOU TO DO YOUR WORK, TAKE CARE OF THINGS AT HOME, OR GET ALONG WITH OTHER PEOPLE?: NOT DIFFICULT AT ALL
7. FEELING AFRAID AS IF SOMETHING AWFUL MIGHT HAPPEN: NOT AT ALL

## 2024-09-04 ASSESSMENT — PATIENT HEALTH QUESTIONNAIRE - PHQ9
10. IF YOU CHECKED OFF ANY PROBLEMS, HOW DIFFICULT HAVE THESE PROBLEMS MADE IT FOR YOU TO DO YOUR WORK, TAKE CARE OF THINGS AT HOME, OR GET ALONG WITH OTHER PEOPLE: NOT DIFFICULT AT ALL
SUM OF ALL RESPONSES TO PHQ QUESTIONS 1-9: 2
SUM OF ALL RESPONSES TO PHQ QUESTIONS 1-9: 2

## 2024-09-04 NOTE — PATIENT INSTRUCTIONS
[facility]  [address] [place] [id number]  Office: [phone]   Fax:    [phone]     For hair loss - to stabilize hair loss or perhaps grow back small amount of your hair - try  Rogaine (Minoxidil is the generic name)  extra strength 5% for men - apply gently with dropper nightly and wash hands afterwards. Foam is a little easier to use than the liquid.   May take up to 6 months to notice a difference.  May just stabilize hair loss.  If you stop using the Rogaine your hair loss will go back to what it was prior to use.       Can also try VibeDeck hair products for hair loss -  [email or web address] -- [employer] - with good reviews and dermatologist recommendations. A bit expensive, but can be worth it.      Nutrafol oral vitamins from [employer] are also sometimes recommended by dermatologists - also a bit expensive.     For acne:   Try Oxywash or Proactiv+ cleanser  for cleansing. - has benzoyl peroxide in it - wash for 30 seconds gently, then leave on for 1-2 minutes - twice daily-  rinse for twice as long - use white linens and wash hands well with soap after using.  Proactiv + available in [facility].    Also highly recommend - Benzaderm Wash - Derma Topix Benzaderm 10% Wash with Aloe Vera 7.75 oz - available on [employer] or our [facility] in [place]  for about $14  - really gentle, but thorough.      Use the clindamycin lotion every am and on the nights you don't use the retin-a or differin cream ( tretinoin) .  Can use clindamycin lotion either on top of the retin-a or differin cream  or underneath retin-a or differin cream ,if getting too much sensitivity with the retin-a.   After cleansing and applying prescriptions, then apply Proactiv + step 3 - salicyclic acid cream - for acne and to help control oil. Or for a bit more of oil-free moisture - try CeraVe PM moisture lotion twice daily - not just at night.  (Skip step 2 of the  "Proactiv + line).      For spot treatment for pustules : try Yves Badescu drying lotion ( available on-line at [employer] or [facility] in [place] - dip q-tip through the clear liquid into the pink substance in the bottom ( don't shake it up) and dot on your pustules.     For makeup try:  Try  Bare Skin foundation from the [employer] or their Matte  line - available at [address] in [place].      Try Cera-Ve with clear zinc sunscreen for face or neutrogena - clear skin -sunscreen daily from forehead to lower chest - check label for silicone derivatives - see below.  [employer] has a great tinted mineral sunscreen without silicones - a little expensive, but worth it. .  Avoid any skin care products or SPF with silicones - dimethicone  is a big one.  Other silicone derivatives can clog pores as well - even if the product is labelled \"noncomedogenic.\" Common ones are dimethicone, cyclomethicone, cyclohexasiloxane, Cetearyl methicone, Cyclpentasiloxane  Avoid any SPF or lotions anything with the suffix  -icone - , -conol, -silane , or -siloxane    Google - silicone derivatives in skin care.     Recheck with Ashwini Garcia MD again in 6 weeks or sooner , if needed.       Unfortunately there is no \"magic wand\" or \" magic pill\" for weight loss.  It requires combination of good nutrition, portion size, slight calorie deficit between consumption and exercise, and perseverance.    Keeping track of calories and intake can really help.  I recommend the myfitnesspal or myplate arsh(s) to help with this.     Dr. Garcia's recommended diet to rev-up metabolism for weight loss:   eat every 2-3 hours.    Try to keep your gram to gram ratio of carbs:protein to 12-15grams of each /small meal 5-6x/day.     Lean protein = 2 egg whites or 1 whole egg, or turkey, chicken, fish.  Low-glycemic fruits = strawberries, blueberries, raspberries, blackberries, nectarines, grapefruit, oranges,  apples.      2 oz. Lean protein + 1/2 " cup  low-glycemic fruit --- breakfast and midmorning snack .     2  oz. Lean protein + 1/4 cup whole grain rice or sweet potato + 1 cup green veggie - lunch, dinner ( increase protein to 3 oz) , and afternoon snack.      Evening snack : 1/2 cup of low glycemic fruit or an apple.        Try to keep carb: protein gram ratio about 1:1 with 12-15g of each per meal with small amount of monounsaturated fat -like olive oil.     If you can't kill it and grill it, or pick it off a plant and eat it - DON'T EAT IT!     For occasional pasta - try Barilla Plus - has protein in it. - keep to 1/2 cup instead of your 1/4 cup of rice or potato.     Take your fish oil capsules 2,000mg daily - with your probiotic, if you take one. Take a multivitamin daily.     Try to keep your gram to gram ratio of carbs:protein to 12-15grams of each /small meal 5-6x/day.     If 5-6 small meals/day - too labor intensive, then keep to 3 meals plus 1 snack with 3 oz lean protein per meal with 2- 3 oz protein in your snack.     AVOID DAIRY AND GRAINS, if you can.   Keep your sodium to 600-1000g per day or less.     Substitutions:   In place of 1 meal/snack a day - you can have- 10 almonds, 4 walnut halves, 5 cashews, 6 pecan halves, or 1/8 cup of sunflower seeds or pistachios  (shelled).      In place of 1 meal/snack a day (1-2x/week) - 1/4 of an Avocado with lettuce wrap -like mendoza , etc.     In place of 1 meal/snack per day - can have a protein bar no more than 130 calories and 15grams each  of sugar /total carbs and protein.   Built bars from builtbar.com or Fit Crunch Bars from [employer]) or [email or web address][employer]re Protein bars - look at the labels.     Drink a lot of water - approx 60 oz/day.      Add in 30-45 minutes of brisk walking/day.     Thank you so much for choosing [facility].  Please contact us with any questions that you may have.   We appreciate the opportunity to serve you now and look forward to supporting your  "healthcare needs for a long time to come!    Most Sincerely,     Ashwini Garcia MD        Thank you so much or choosing [facility]  for your Health Care. It was a pleasure seeing you at your visit today! Please contact us with any questions or concerns you may have.                   Ashwini Garcia MD                              To reach your [facility] care team after hours call:   [phone] press #[phone] \"to speak with your care team\".  This will get you to our clinic instead of routing to central [facility]  scheduling.     PLEASE NOTE OUR HOURS HAVE CHANGED secondary to COVID-19 coronavirus pandemic, as we are trying to minimize patient exposure to the virus,  which is now widespread in the [facility].  These hours may change with very little notice.  We apologize for any inconvenience.       Our current clinic hours are:          Monday- Thursday   7:00am - 6:00pm  in person.      Friday  7:00am- 5:00pm                       Saturday and Sunday : Closed to in person and virtual visits        We have telephone and virtual visit times available between    7:00am - 6pm on Monday-Friday as well.                                                Phone:  [phone]      Our pharmacy hours: Monday through Friday 8:00am to 5:00pm                        Saturday - 9:00 am to 12 noon       Sunday : Closed.              Phone:  [phone]              ###  Please note: at this time we are not accepting any walk-in visits. ###      There is also information available at our web site:  [email or web address]    If your provider ordered any lab tests and you do not receive the results within 10 business days, please call the clinic.    If you need a medication refill please contact your pharmacy.  Please allow 3 business days for your refill to be completed.    Our clinic offers telephone visits and e visits.  Please ask one of your team members to explain " more.      Use [employer] (secure email communication and access to your chart) to send your primary care provider a message or make an appointment. Ask someone on your Team how to sign up for [employer].

## 2024-09-04 NOTE — PROGRESS NOTES
"  Assessment & Plan       ICD-10-CM    1. Alopecia areata- ? traction alopecia  L63.9 INSULIN, SERUM     TSH with free T4 reflex     TSH with free T4 reflex      2. Cystic acne  L70.0 Basic metabolic panel  (Ca, Cl, CO2, Creat, Gluc, K, Na, BUN)     spironolactone (ALDACTONE) 50 MG tablet     Basic metabolic panel  (Ca, Cl, CO2, Creat, Gluc, K, Na, BUN)      3. Telogen effluvium  L65.0 TSH with free T4 reflex     Luteinizing Hormone     Follicle stimulating hormone     Testosterone, total     Estradiol     Progesterone     TSH with free T4 reflex     Luteinizing Hormone     Follicle stimulating hormone     Testosterone, total     Estradiol     Progesterone      4. Other viral warts- 2 on scalp - treated with liquid n2  B07.8 DESTRUCT BENIGN LESION, UP TO 14        Future Appointments 9/4/2024 - 3/3/2025        Date Visit Type Length Department Provider     1/27/2025  1:40 PM [facility] PREVENTATIVE ADULT VISIT 40 min [facility] Ashwini Garcia MD    Location Instructions:     [facility] is located at [address] [place] [address], along [address] Free parking is available; access the lot by turning [address] from [address] onto [address] [facility], then west onto [address] [facility].                      Recheck complexion with me in 6-8 weeks via video visit if needed, otherwise Return in about 5 months (around 1/25/2025) for Wellness/Preventative Visit, w/ Dr. POLLACK for 40 min appt.       BMI  Estimated body mass index is 33.43 kg/m  as calculated from the following:    Height as of this encounter: 1.676 m (5' 6\").    Weight as of this encounter: 93.9 kg (207 lb 1.6 oz).   Weight management plan: Discussed healthy diet and exercise guidelines      MEDICATIONS:   Orders Placed This Encounter   Medications    spironolactone (ALDACTONE) 50 MG tablet     Sig: Take 1 tablet (50 mg) by mouth daily.     Dispense:  90 tablet     Refill:  1          - Continue other medications without change  Work on " weight loss  Regular exercise  See Patient Instructions       Ashwini Garcia MD          Payal Sweet is a 28 year old, presenting for the following health issues:  Derm Problem (Hair loss and rash face and back)        9/4/2024     1:24 PM   Additional Questions   Roomed by Yasmine BRADLEY   Accompanied by self     History of Present Illness       Reason for visit:  Question about possible hormone imbalance  Symptom onset:  More than a month  Symptoms include:  Scalp/hair concerns, skin (back) acne, struggle with weight loss  Symptom intensity:  Moderate  Symptom progression:  Staying the same  Had these symptoms before:  Yes  Has tried/received treatment for these symptoms:  No   She is taking medications regularly.        More tired than usual. Going to the gym 4 days/week x 1 year and not seeing any significant weight loss. Hasn't noticed a difference in how her clothes fit. Has been eating in a calorie deficit but eating about 2000 calories/day. Discussed shooting for 1437-5040 calories/day and continue workouts.   And shoot for 75-90 g of protein daily.      Pt has noted that she has had overall hair thinning  over the last several months and had an irregular circular area of hair loss on her left medial superior scalp = about 2.5cm in diameter.  She tends to pull her hair back and has a top bun that sits right now.  She took a picture about a month ago and showed that to me today.     Wt Readings from Last 5 Encounters:   09/04/24 93.9 kg (207 lb 1.6 oz)   01/25/24 91.5 kg (201 lb 12.8 oz)   10/09/23 95.3 kg (210 lb)   01/27/22 83.5 kg (184 lb)   06/22/20 85.3 kg (188 lb)         Rash:   Onset/Duration: 2 months ago.   Description  Location: in hair only se reddness  Character: itchy and sometimes sore  Itching: moderate  Intensity:  moderate  Progression of Symptoms:  same  Accompanying signs and symptoms:   Fever: No  Body aches or joint pain: No  Sore throat symptoms: No  Recent cold symptoms:  "No  History:           Previous episodes of similar rash: None  New exposures:  None  Recent travel: No  Exposure to similar rash: No  Precipitating or alleviating factors:   Therapies tried and outcome: Aquaphor  Concern - hair loss - noticed it first about 2 months ago - had a somewhat circular patch of hair loss left posterior part area. Had to start parting her hair in a different spot to cover it.      Has been using a whey protein shake.  No supplements for weight loss.  Complexion : started to get increased acne lesions over the last year or so. Started to get larger cystic lesions on her back.     Having normal regular periods every 28-30 days or so.          Review of Systems  Constitutional, HEENT, cardiovascular, pulmonary, GI, , musculoskeletal, neuro, skin, endocrine and psych systems are negative, except as otherwise noted.      Objective    /80 (BP Location: Right arm, Patient Position: Chair, Cuff Size: Adult Regular)   Pulse 102   Temp 98.5  F (36.9  C) (Tympanic)   Resp 18   Ht 1.676 m (5' 6\")   Wt 93.9 kg (207 lb 1.6 oz)   LMP 08/31/2024 (Approximate)   SpO2 99%   BMI 33.43 kg/m    Body mass index is 33.43 kg/m .  Physical Exam   GENERAL: alert and no distress  EYES: Eyes grossly normal to inspection, PERRL and conjunctivae and sclerae normal  HENT: ear canals and TM's normal, nose and mouth without ulcers or lesions  NECK: no adenopathy, no asymmetry, masses, or scars  RESP: lungs clear to auscultation - no rales, rhonchi or wheezes  CV: regular rate and rhythm, normal S1 S2, no S3 or S4, no murmur, click or rub, no peripheral edema  ABDOMEN: soft, nontender, no hepatosplenomegaly, no masses and bowel sounds normal  MS: no gross musculoskeletal defects noted, no edema  SKIN: mild diffuse overall hair thinning noted from previous.  We could not locate the previous irregular circular area of hair loss. There was an area similar to that with newer hair regrowth,  no suspicious " lesions or rashes, but diffuse mild hair thinning, cystic acne on face and back noted. . 2 warts on scalp - Treated with liq  using 2 freeze thaw cycles of 3-5 seconds each.   NEURO: Normal strength and tone, mentation intact and speech normal  PSYCH: mentation appears normal, affect normal/bright    Discussed possibility of blistering after the liquid n2 rx - if blister does occur - soak a small needle in rubbing alcohol for 10 minutes then apply rubbing alcohol to skin - let dry - then prick side of blister with the disinfected needle to let fluid out.  Also discussed  possibility of hypopigmentation or hyperpigmentation after cryotherapy.       Office Visit on 01/25/2024   Component Date Value Ref Range Status    Interpretation 01/25/2024 Negative for Intraepithelial Lesion or Malignancy (NILM)    Final    Comment 01/25/2024    Final                    Value:This result contains rich text formatting which cannot be displayed here.    Specimen Adequacy 01/25/2024 Satisfactory for evaluation, endocervical/transformation zone component present   Final    Clinical Information 01/25/2024    Final                    Value:This result contains rich text formatting which cannot be displayed here.    LMP/Menopause Date 01/25/2024    Final                    Value:This result contains rich text formatting which cannot be displayed here.    Reflex Testing 01/25/2024 Yes if ASCUS   Final    Previous Abnormal? 01/25/2024    Final                    Value:This result contains rich text formatting which cannot be displayed here.    Performing Labs 01/25/2024    Final                    Value:This result contains rich text formatting which cannot be displayed here.    Cholesterol 01/25/2024 170  <200 mg/dL Final    Triglycerides 01/25/2024 66  <150 mg/dL Final    Direct Measure HDL 01/25/2024 47 (L)  >=50 mg/dL Final    LDL Cholesterol Calculated 01/25/2024 110 (H)  <=100 mg/dL Final    Non HDL Cholesterol 01/25/2024 123  <130  mg/dL Final    Patient Fasting > 8hrs? 01/25/2024 Yes   Final    WBC Count 01/25/2024 7.2  4.0 - 11.0 10e3/uL Final    RBC Count 01/25/2024 4.38  3.80 - 5.20 10e6/uL Final    Hemoglobin 01/25/2024 12.4  11.7 - 15.7 g/dL Final    Hematocrit 01/25/2024 37.3  35.0 - 47.0 % Final    MCV 01/25/2024 85  78 - 100 fL Final    MCH 01/25/2024 28.3  26.5 - 33.0 pg Final    MCHC 01/25/2024 33.2  31.5 - 36.5 g/dL Final    RDW 01/25/2024 12.9  10.0 - 15.0 % Final    Platelet Count 01/25/2024 247  150 - 450 10e3/uL Final           Signed Electronically by: Ashwini Garcia MD   fair

## 2024-09-05 LAB
ANION GAP SERPL CALCULATED.3IONS-SCNC: 13 MMOL/L (ref 7–15)
BUN SERPL-MCNC: 17.5 MG/DL (ref 6–20)
CALCIUM SERPL-MCNC: 9.5 MG/DL (ref 8.8–10.4)
CHLORIDE SERPL-SCNC: 104 MMOL/L (ref 98–107)
CREAT SERPL-MCNC: 0.81 MG/DL (ref 0.51–0.95)
EGFRCR SERPLBLD CKD-EPI 2021: >90 ML/MIN/1.73M2
ESTRADIOL SERPL-MCNC: 31 PG/ML
FSH SERPL IRP2-ACNC: 7.9 MIU/ML
GLUCOSE SERPL-MCNC: 84 MG/DL (ref 70–99)
HCO3 SERPL-SCNC: 25 MMOL/L (ref 22–29)
LH SERPL-ACNC: 7.2 MIU/ML
POTASSIUM SERPL-SCNC: 3.9 MMOL/L (ref 3.4–5.3)
PROGEST SERPL-MCNC: 0.2 NG/ML
SODIUM SERPL-SCNC: 142 MMOL/L (ref 135–145)
TSH SERPL DL<=0.005 MIU/L-ACNC: 1.88 UIU/ML (ref 0.3–4.2)

## 2024-09-07 LAB — TESTOST SERPL-MCNC: 20 NG/DL (ref 8–60)

## 2024-09-23 NOTE — RESULT ENCOUNTER NOTE
Dear Micki,       All your recent labs that I ordered are normal, improved, or pretty stable from previous.     Please, continue your current medications and/or supplements and follow up as we discussed at your last visit.     For additional lab test information, [email or web address] is an excellent reference.    Thank you so much for choosing [facility].  Please contact us with any questions that you may have.   We appreciate the opportunity to serve you now and look forward to supporting your healthcare needs for a long time to come!    Most Sincerely,     Ashwini Garcia MD

## 2025-01-07 NOTE — TELEPHONE ENCOUNTER
Labs and urine testing pre next visit.    Noted as FYI.   Fwd back to [facility] well [facility] to further follow up with pt.

## 2025-07-24 ENCOUNTER — PATIENT OUTREACH (OUTPATIENT)
Dept: CARE COORDINATION | Facility: CLINIC | Age: 30
End: 2025-07-24
Payer: COMMERCIAL